# Patient Record
Sex: FEMALE | URBAN - METROPOLITAN AREA
[De-identification: names, ages, dates, MRNs, and addresses within clinical notes are randomized per-mention and may not be internally consistent; named-entity substitution may affect disease eponyms.]

---

## 2022-05-18 ENCOUNTER — APPOINTMENT (OUTPATIENT)
Dept: RADIOLOGY | Facility: CLINIC | Age: 37
End: 2022-05-18
Payer: COMMERCIAL

## 2022-05-18 ENCOUNTER — OFFICE VISIT (OUTPATIENT)
Dept: URGENT CARE | Facility: CLINIC | Age: 37
End: 2022-05-18
Payer: COMMERCIAL

## 2022-05-18 VITALS
HEIGHT: 63 IN | RESPIRATION RATE: 18 BRPM | OXYGEN SATURATION: 95 % | WEIGHT: 193 LBS | TEMPERATURE: 96.1 F | HEART RATE: 95 BPM | SYSTOLIC BLOOD PRESSURE: 141 MMHG | BODY MASS INDEX: 34.2 KG/M2 | DIASTOLIC BLOOD PRESSURE: 93 MMHG

## 2022-05-18 DIAGNOSIS — M54.6 ACUTE THORACIC BACK PAIN, UNSPECIFIED BACK PAIN LATERALITY: Primary | ICD-10-CM

## 2022-05-18 DIAGNOSIS — Z76.89 ENCOUNTER TO ESTABLISH CARE: ICD-10-CM

## 2022-05-18 DIAGNOSIS — M54.50 LUMBAR BACK PAIN: ICD-10-CM

## 2022-05-18 DIAGNOSIS — M54.6 ACUTE THORACIC BACK PAIN, UNSPECIFIED BACK PAIN LATERALITY: ICD-10-CM

## 2022-05-18 LAB — SL AMB POCT URINE HCG: NEGATIVE

## 2022-05-18 PROCEDURE — 72072 X-RAY EXAM THORAC SPINE 3VWS: CPT

## 2022-05-18 PROCEDURE — G0382 LEV 3 HOSP TYPE B ED VISIT: HCPCS | Performed by: PHYSICIAN ASSISTANT

## 2022-05-18 PROCEDURE — S9083 URGENT CARE CENTER GLOBAL: HCPCS | Performed by: PHYSICIAN ASSISTANT

## 2022-05-18 PROCEDURE — 72100 X-RAY EXAM L-S SPINE 2/3 VWS: CPT

## 2022-05-18 PROCEDURE — 81025 URINE PREGNANCY TEST: CPT | Performed by: PHYSICIAN ASSISTANT

## 2022-05-18 RX ORDER — MELOXICAM 15 MG/1
15 TABLET ORAL DAILY
Qty: 20 TABLET | Refills: 0 | Status: SHIPPED | OUTPATIENT
Start: 2022-05-18

## 2022-05-18 RX ORDER — IBUPROFEN 200 MG
600 TABLET ORAL EVERY 6 HOURS PRN
COMMUNITY

## 2022-05-18 RX ORDER — METHOCARBAMOL 750 MG/1
TABLET, FILM COATED ORAL
Qty: 24 TABLET | Refills: 0 | Status: SHIPPED | OUTPATIENT
Start: 2022-05-18

## 2022-05-18 NOTE — PATIENT INSTRUCTIONS
Stop ibuprofen  Start meloxicam and muscle relaxant as instructed  May add Tylenol throughout day if needed  May have synergistic effect with other medications  May do cold or warm compresses to back for comfort as needed  10-15 minutes every 1-2 hours while awake  Or Lidocaine pain patches that you can get over the counter  Consider physical therapy  Referral placed in chart for therapy if you would like to pursue  I believe this could be very helpful for you  Referral placed for family practice provider  Someone should contact you within the next 24-48 hours business days to talk with you about establishing care in this area  If significant worsening of pain, shortness of breath, profound weakness proceed immediately to emergency room for further evaluation

## 2022-05-18 NOTE — LETTER
May 18, 2022     Patient: Azucena Greer   YOB: 1985   Date of Visit: 5/18/2022       To Whom It May Concern:    Patient was seen in office today for acute medical ailment  May attempt return to work the next 2-3 days as tolerated           Sincerely,        Radha Schultz PA-C    CC: No Recipients

## 2022-05-18 NOTE — PROGRESS NOTES
[facility]    NAME: Saritha German is a 29 y o  female  : 1985    MRN: [id number]  DATE: May 18, 2022  TIME: 10:11 AM    Assessment and Plan   Acute thoracic back pain, unspecified back pain laterality [M54 6]  1  Acute thoracic back pain, unspecified back pain laterality  XR spine thoracic 3 vw    XR spine lumbar 2 or 3 views injury    POCT urine HCG    Ambulatory Referral to Physical Therapy    meloxicam (Mobic) 15 mg tablet    methocarbamol (ROBAXIN) 750 mg tablet   2  Lumbar back pain  POCT urine HCG    Ambulatory Referral to Physical Therapy    meloxicam (Mobic) 15 mg tablet    methocarbamol (ROBAXIN) 750 mg tablet   3  Encounter to establish care  Ambulatory Referral to [facility]     X-ray thoracic spine:  No acute bony changes  Await radiologist's final test results  X-ray lumbar spine:  No acute bony changes  Await radiologist's final test results  Patient Instructions   Patient Instructions   Stop ibuprofen  Start meloxicam and muscle relaxant as instructed  May add Tylenol throughout day if needed  May have synergistic effect with other medications  May do cold or warm compresses to back for comfort as needed  10-15 minutes every 1-2 hours while awake  Or Lidocaine pain patches that you can get over the counter  Consider physical therapy  Referral placed in chart for therapy if you would like to pursue  I believe this could be very helpful for you  Referral placed for family practice provider  Someone should contact you within the next 24-48 hours business days to talk with you about establishing care in this area  If significant worsening of pain, shortness of breath, profound weakness proceed immediately to emergency room for further evaluation  Chief Complaint     Chief Complaint   Patient presents with    Back Pain     Patient states that she was at home sitting giancarlo chair and twisted the wrong way on Monday and felt a pop in the back  Taking Ibuprofen and using heat with no relief       History of Present Illness   Shana Shane presents to the clinic c/o  58-year-old female with acute mid back pain that started suddenly when she was turning in a chair on Monday  Pain takes her breath away  Twisting to the right or bending causes increased pain  She is having trouble sleeping  She has been taking ibuprofen sometimes 4-6 at a time sporadically without relief  No new numbness in saddle area  She has had some residual numbness post  and childbirth  No numbness tingling weakness of the legs  She did have some discomfort left arm on Monday night but that has resolved  Taking deep breath causes increased pain  No fever chills  No weight loss  She and her  are actively trying to get pregnant  She is due for her next in the next couple days  She did take home pregnancy test that was negative  Review of Systems   Review of Systems   Constitutional: Positive for activity change and appetite change  Negative for chills, fatigue and fever  Respiratory: Negative  Cardiovascular: Negative  Musculoskeletal: Positive for arthralgias and back pain  Negative for myalgias  Skin: Negative for color change  Current Medications     Long-Term Medications   Medication Sig Dispense Refill    ibuprofen (MOTRIN) 200 mg tablet Take 600 mg by mouth every 6 (six) hours as needed for mild pain      meloxicam (Mobic) 15 mg tablet Take 1 tablet (15 mg total) by mouth in the morning  20 tablet 0    methocarbamol (ROBAXIN) 750 mg tablet 1/2 to 1 tablet every 6-8 hours or hs prn for muscle pain, spasms  Home use only   24 tablet 0       Current Allergies     Allergies as of 2022    (No Known Allergies)          The following portions of the patient's history were reviewed and updated as appropriate: allergies, current medications, past family history, past medical history, past social history, past surgical history and problem list   History reviewed  No pertinent past medical history  History reviewed  No pertinent surgical history  History reviewed  No pertinent family history  Objective   /93   Pulse 95   Temp (!) 96 1 °F (35 6 °C) (Tympanic)   Resp 18   Ht 5' 3" (1 6 m)   Wt 87 5 kg (193 lb)   LMP 04/23/2022 (Exact Date)   SpO2 95%   BMI 34 19 kg/m²   Patient's last menstrual period was 04/23/2022 (exact date)  Physical Exam     Physical Exam  Vitals and nursing note reviewed  Constitutional:       General: She is not in acute distress  Appearance: She is well-developed  She is not ill-appearing, toxic-appearing or diaphoretic  Comments: Appears uncomfortable but in no acute acute distress  Antalgic movement getting up and down on exam room table  Cardiovascular:      Rate and Rhythm: Normal rate  Heart sounds: Normal heart sounds  No murmur heard  No friction rub  No gallop  Pulmonary:      Effort: Pulmonary effort is normal  No respiratory distress  Breath sounds: Normal breath sounds  No stridor  No wheezing, rhonchi or rales  Musculoskeletal:         General: Tenderness present  No swelling or deformity  Comments: No gross spinal process or paraspinal muscle TTP  Good flexion and extension  Increased pain with right lateral bend and twist with wincing pain at times  No palpable spasms  Skin:     Findings: No bruising, erythema or lesion  Neurological:      Mental Status: She is alert and oriented to person, place, and time     Psychiatric:         Mood and Affect: Mood normal          Behavior: Behavior normal

## 2022-05-25 ENCOUNTER — OFFICE VISIT (OUTPATIENT)
Dept: URGENT CARE | Facility: CLINIC | Age: 37
End: 2022-05-25
Payer: COMMERCIAL

## 2022-05-25 VITALS
OXYGEN SATURATION: 100 % | WEIGHT: 195 LBS | SYSTOLIC BLOOD PRESSURE: 122 MMHG | TEMPERATURE: 96.8 F | BODY MASS INDEX: 33.29 KG/M2 | HEART RATE: 100 BPM | DIASTOLIC BLOOD PRESSURE: 86 MMHG | HEIGHT: 64 IN | RESPIRATION RATE: 16 BRPM

## 2022-05-25 DIAGNOSIS — Z20.822 ENCOUNTER FOR LABORATORY TESTING FOR COVID-19 VIRUS: ICD-10-CM

## 2022-05-25 DIAGNOSIS — J32.9 SINUSITIS, UNSPECIFIED CHRONICITY, UNSPECIFIED LOCATION: Primary | ICD-10-CM

## 2022-05-25 DIAGNOSIS — J40 BRONCHITIS: ICD-10-CM

## 2022-05-25 PROCEDURE — 87636 SARSCOV2 & INF A&B AMP PRB: CPT | Performed by: PHYSICIAN ASSISTANT

## 2022-05-25 PROCEDURE — S9083 URGENT CARE CENTER GLOBAL: HCPCS | Performed by: PHYSICIAN ASSISTANT

## 2022-05-25 PROCEDURE — G0382 LEV 3 HOSP TYPE B ED VISIT: HCPCS | Performed by: PHYSICIAN ASSISTANT

## 2022-05-25 RX ORDER — AZITHROMYCIN 250 MG/1
TABLET, FILM COATED ORAL
Qty: 6 TABLET | Refills: 0 | Status: SHIPPED | OUTPATIENT
Start: 2022-05-25 | End: 2022-05-29

## 2022-05-25 RX ORDER — BENZONATATE 100 MG/1
100 CAPSULE ORAL 3 TIMES DAILY PRN
Qty: 20 CAPSULE | Refills: 0 | Status: SHIPPED | OUTPATIENT
Start: 2022-05-25

## 2022-05-25 NOTE — LETTER
May 26, 2022     Patient: Ruiz Mast   YOB: 1985   Date of Visit: 5/25/2022       To Whom It May Concern:    Ruiz Mast was seen in my office on 5/25/2022  She had a positive SARS-COV-2 PCR test  It is my medical opinion that Ruiz Mast may return to work when symptoms subside  If you have any questions or concerns, please don't hesitate to call           Sincerely,        Christine John PA-C    CC: No Recipients

## 2022-05-25 NOTE — PROGRESS NOTES
[facility]        NAME: Saritha German is a 29 y o  female  : 1985    MRN: [id number]  DATE: May 25, 2022  TIME: 11:44 AM    /86   Pulse 100   Temp (!) 96 8 °F (36 °C) (Tympanic)   Resp 16   Ht 5' 4" (1 626 m)   Wt 88 5 kg (195 lb)   LMP 2022 (Approximate)   SpO2 100%   BMI 33 47 kg/m²     Assessment and Plan   Sinusitis, unspecified chronicity, unspecified location [J32 9]  1  Sinusitis, unspecified chronicity, unspecified location  Covid/Flu-Office Collect    azithromycin (ZITHROMAX) 250 mg tablet    benzonatate (TESSALON PERLES) 100 mg capsule   2  Bronchitis  azithromycin (ZITHROMAX) 250 mg tablet    benzonatate (TESSALON PERLES) 100 mg capsule   3  Encounter for laboratory testing for COVID-19 virus  azithromycin (ZITHROMAX) 250 mg tablet    benzonatate (TESSALON PERLES) 100 mg capsule         Patient Instructions       Follow up with PCP in 3-5 days  Proceed to  ER if symptoms worsen  Chief Complaint     Chief Complaint   Patient presents with    Cold Like Symptoms     Pt c/o fever, body aches, sinus pressure, congestion, and cough for the past 4 days  Pt took 2 at home COVID tests (negative)  Pt has been taking Tylenol for symptoms  History of Present Illness       Pt with 3-4 days of body aches fever sinus congestion and productive cough       Review of Systems   Review of Systems   Constitutional: Positive for fatigue and fever  HENT: Positive for congestion  Eyes: Negative  Respiratory: Positive for cough  Cardiovascular: Negative  Gastrointestinal: Negative  Endocrine: Negative  Genitourinary: Negative  Musculoskeletal: Positive for myalgias  Skin: Negative  Allergic/Immunologic: Negative  Neurological: Negative  Hematological: Negative  Psychiatric/Behavioral: Negative  All other systems reviewed and are negative          Current Medications       Current Outpatient Medications:     azithromycin (ZITHROMAX) 250 mg tablet, Take 2 tablets today then 1 tablet daily x 4 days, Disp: 6 tablet, Rfl: 0    benzonatate (TESSALON PERLES) 100 mg capsule, Take 1 capsule (100 mg total) by mouth 3 (three) times a day as needed for cough, Disp: 20 capsule, Rfl: 0    ibuprofen (MOTRIN) 200 mg tablet, Take 600 mg by mouth every 6 (six) hours as needed for mild pain, Disp: , Rfl:     meloxicam (Mobic) 15 mg tablet, Take 1 tablet (15 mg total) by mouth in the morning , Disp: 20 tablet, Rfl: 0    methocarbamol (ROBAXIN) 750 mg tablet, 1/2 to 1 tablet every 6-8 hours or hs prn for muscle pain, spasms  Home use only  , Disp: 24 tablet, Rfl: 0    Current Allergies     Allergies as of 05/25/2022    (No Known Allergies)            The following portions of the patient's history were reviewed and updated as appropriate: allergies, current medications, past family history, past medical history, past social history, past surgical history and problem list      History reviewed  No pertinent past medical history  History reviewed  No pertinent surgical history  History reviewed  No pertinent family history  Medications have been verified  Objective   /86   Pulse 100   Temp (!) 96 8 °F (36 °C) (Tympanic)   Resp 16   Ht 5' 4" (1 626 m)   Wt 88 5 kg (195 lb)   LMP 05/24/2022 (Approximate)   SpO2 100%   BMI 33 47 kg/m²        Physical Exam     Physical Exam  Vitals and nursing note reviewed  Constitutional:       Appearance: Normal appearance  She is normal weight  HENT:      Head: Normocephalic and atraumatic  Right Ear: Tympanic membrane, ear canal and external ear normal       Left Ear: Tympanic membrane, ear canal and external ear normal       Nose: Congestion and rhinorrhea present  Comments: Yellow nasal d/c boggy mucosa max sinus tenderness      Mouth/Throat:      Mouth: Mucous membranes are moist       Pharynx: Oropharynx is clear  Eyes:      Extraocular Movements: Extraocular movements intact  Conjunctiva/sclera: Conjunctivae normal       Pupils: Pupils are equal, round, and reactive to light  Cardiovascular:      Rate and Rhythm: Normal rate and regular rhythm  Pulses: Normal pulses  Heart sounds: Normal heart sounds  Pulmonary:      Effort: Pulmonary effort is normal       Comments: Minor coarse sounds   Abdominal:      General: Abdomen is flat  Bowel sounds are normal       Palpations: Abdomen is soft  Musculoskeletal:         General: Normal range of motion  Cervical back: Normal range of motion and neck supple  Skin:     General: Skin is warm  Capillary Refill: Capillary refill takes less than 2 seconds  Neurological:      General: No focal deficit present  Mental Status: She is alert and oriented to person, place, and time     Psychiatric:         Mood and Affect: Mood normal          Behavior: Behavior normal

## 2022-05-25 NOTE — LETTER
May 25, 2022     Patient: Elsie Doing   YOB: 1985   Date of Visit: 5/25/2022       To Whom It May Concern: It is my medical opinion that Elsie Doing should remain out of work until test results are back and are negative   If you have any questions or concerns, please don't hesitate to call           Sincerely,        Ace Matute PA-C    CC: No Recipients

## 2022-05-26 LAB
FLUAV RNA RESP QL NAA+PROBE: NEGATIVE
FLUBV RNA RESP QL NAA+PROBE: NEGATIVE
SARS-COV-2 RNA RESP QL NAA+PROBE: POSITIVE

## 2023-05-08 LAB
EXTERNAL CHLAMYDIA RESULT: NEGATIVE
EXTERNAL HIV SCREEN: NORMAL
HCV AB SER-ACNC: NEGATIVE
N GONORRHOEA RRNA SPEC QL PROBE: NEGATIVE

## 2023-05-15 PROBLEM — E66.9 OBESITY (BMI 30-39.9): Status: ACTIVE | Noted: 2023-05-15

## 2023-05-15 PROBLEM — K21.9 GASTROESOPHAGEAL REFLUX DISEASE WITHOUT ESOPHAGITIS: Status: ACTIVE | Noted: 2023-05-15

## 2023-05-15 NOTE — PROGRESS NOTES
"[facility]    NAME: Marilee Ledezma  AGE: 40 y o  SEX: female  : 1985     DATE: 2023     Assessment and Plan:     Patient presents today to establish care at our practice  Baseline labs recently ordered at [facility]  Fertility office in [facility], will request records  See [facility] specific charting    Follow up in 1 5 months or sooner pending test results      Problem List Items Addressed This Visit        Digestive    Gastroesophageal reflux disease without esophagitis     \"I had an endoscopy years ago    I was told I had silent reflux but never took anything  \" Reports worsening heartburn over the past year, currently taking omeprazole OTC prn, \"it works when I take it\"  Gets heartburn approx every 3 days  Denies dysphagia or painful swallowing  Patient agreed to start a 3-month regimen of omeprazole 20 mg daily to be taken regardless of symptoms  After this 3-month regimen is completed, will initiate a taper and trial of an H2 blocker  Relevant Medications    omeprazole (PriLOSEC) 20 mg delayed release capsule       Respiratory    Asthma     \"I think my heartburn and allergies makes it worse    it has worsened as the years go by  \" Requesting a refill of albuterol to use prn  Will assess need for an ICS at 3 month follow-up  Relevant Medications    albuterol (Proventil HFA) 90 mcg/act inhaler       Other    Obesity (BMI 30-39  9)    Seasonal allergies     Well controlled with prn loratadine  Moderate episode of recurrent major depressive disorder ([facility] [address] )     Reports a hx of panic attacks, used to take Xanax prn  Elevated PHQ score today, patient reports \"My issues are related to relationships and stress at work  I used to take Lexapro daily  \"     Patient is requesting to get restarted on Lexapro  Discussed side effects, pharmacokinetics, and pharmacodynamics of the medication    Will have the patient " follow-up in 6 weeks to reassess mental health  Relevant Medications    escitalopram (LEXAPRO) 5 mg tablet    RESOLVED: Reactive depression    Relevant Medications    escitalopram (LEXAPRO) 5 mg tablet   Other Visit Diagnoses     Annual physical exam    -  Primary    Encounter to establish care        Screening for cervical cancer        Relevant Orders    Ambulatory referral to Obstetrics / Gynecology    Depression screening              Immunizations and preventive care screenings were discussed with patient today  Appropriate education was printed on patient's after visit summary  Counseling:  Exercise: the importance of regular exercise/physical activity was discussed  Recommend exercise 3-5 times per week for at least 30 minutes  BMI Counseling: There is no height or weight on file to calculate BMI  The BMI is above normal  Nutrition recommendations include encouraging healthy choices of fruits and vegetables and consuming healthier snacks  Exercise recommendations include exercising 3-5 times per week  Rationale for BMI follow-up plan is due to patient being overweight or obese  Return in about 6 weeks (around 2023)  Chief Complaint:     Chief Complaint   Patient presents with   • Establish Care   • Asthma   • Heartburn      History of Present Illness:     Adult Annual Physical   Patient here for a comprehensive physical exam  The patient reports problems - heartburn and asthma  Diet and Physical Activity  Diet/Nutrition: poor diet  Exercise: no formal exercise        Depression Screening  PHQ-2/9 Depression Screening    Little interest or pleasure in doing things: 2 - more than half the days  Feeling down, depressed, or hopeless: 2 - more than half the days  Trouble falling or staying asleep, or sleeping too much: 2 - more than half the days  Feeling tired or having little energy: 2 - more than half the days  Poor appetite or overeatin - several days  Feeling bad about "yourself - or that you are a failure or have let yourself or your family down: 2 - more than half the days  Trouble concentrating on things, such as reading the newspaper or watching television: 1 - several days  Moving or speaking so slowly that other people could have noticed  Or the opposite - being so fidgety or restless that you have been moving around a lot more than usual: 2 - more than half the days  Thoughts that you would be better off dead, or of hurting yourself in some way: 0 - not at all  PHQ-2 Score: 4  PHQ-2 Interpretation: POSITIVE depression screen  PHQ-9 Score: 14   PHQ-9 Interpretation: Moderate depression        General Health  Sleep: sleeps well and snores loudly  Hearing: normal - bilateral   Vision: no vision problems  Dental: regular dental visits  Review of Systems:     Review of Systems   Constitutional: Negative  HENT: Negative  Eyes: Negative  Respiratory: Negative  Negative for shortness of breath and wheezing  Cardiovascular: Negative  Gastrointestinal: Negative  \"reflux\"   Endocrine: Negative  Genitourinary: Negative  Musculoskeletal: Negative  Skin: Negative  Allergic/Immunologic: Negative  Neurological: Negative  Hematological: Negative  Psychiatric/Behavioral: Positive for dysphoric mood  Negative for self-injury, sleep disturbance and suicidal ideas  The patient is nervous/anxious  Past Medical History:     History reviewed  No pertinent past medical history     Past Surgical History:     Past Surgical History:   Procedure Laterality Date   • APPENDECTOMY     •  SECTION        Social History:     Social History     Socioeconomic History   • Marital status: /Civil Union     Spouse name: None   • Number of children: None   • Years of education: None   • Highest education level: None   Occupational History   • None   Tobacco Use   • Smoking status: Former     Years: 10 00     Types: Cigarettes     Quit " "date: 2018     Years since quittin 3   • Smokeless tobacco: Never   Vaping Use   • Vaping Use: Former   Substance and Sexual Activity   • Alcohol use: Yes     Comment: 1-2 per week   • Drug use: None   • Sexual activity: None   Other Topics Concern   • None   Social History Narrative   • None     Social Determinants of Health     Financial Resource Strain: Not on file   Food Insecurity: Not on file   Transportation Needs: Not on file   Physical Activity: Not on file   Stress: Not on file   Social Connections: Not on file   Intimate Partner Violence: Not on file   Housing Stability: Not on file      Family History:     Family History   Problem Relation Age of Onset   • Diabetes Mother    • Asthma Mother    • Fibromyalgia Mother       Current Medications:     Current Outpatient Medications   Medication Sig Dispense Refill   • albuterol (Proventil HFA) 90 mcg/act inhaler Inhale 2 puffs every 6 (six) hours as needed for wheezing 6 7 g 5   • escitalopram (LEXAPRO) 5 mg tablet Take 1 tablet (5 mg total) by mouth daily 90 tablet 0   • loratadine (CLARITIN) 10 mg tablet Take 10 mg by mouth daily     • omeprazole (PriLOSEC) 20 mg delayed release capsule Take 1 capsule (20 mg total) by mouth daily 90 capsule 0     No current facility-administered medications for this visit  Allergies:     No Known Allergies   Physical Exam:     /92 (BP Location: Left arm, Patient Position: Sitting, Cuff Size: Large)   Pulse 86   Temp 98 2 °F (36 8 °C) (Tympanic)   Resp 16   Ht 5' 4\" (1 626 m)   Wt 98 5 kg (217 lb 2 5 oz)   SpO2 98%   BMI 37 27 kg/m²     Physical Exam  Vitals and nursing note reviewed  Constitutional:       Appearance: Normal appearance  She is obese  HENT:      Head: Normocephalic  Right Ear: Tympanic membrane normal       Left Ear: Tympanic membrane normal       Nose: Nose normal  No congestion  Mouth/Throat:      Mouth: Mucous membranes are moist       Pharynx: Oropharynx is clear   No " oropharyngeal exudate  Eyes:      Extraocular Movements: Extraocular movements intact  Conjunctiva/sclera: Conjunctivae normal       Pupils: Pupils are equal, round, and reactive to light  Cardiovascular:      Rate and Rhythm: Normal rate and regular rhythm  Pulses: Normal pulses  Heart sounds: Normal heart sounds  No murmur heard  Pulmonary:      Effort: Pulmonary effort is normal  No respiratory distress  Breath sounds: Normal breath sounds  No wheezing  Abdominal:      General: Bowel sounds are normal       Palpations: Abdomen is soft  Tenderness: There is no abdominal tenderness  Musculoskeletal:         General: No swelling, tenderness, deformity or signs of injury  Normal range of motion  Cervical back: Normal range of motion and neck supple  No tenderness  Right lower leg: No edema  Left lower leg: No edema  Lymphadenopathy:      Cervical: No cervical adenopathy  Skin:     General: Skin is warm and dry  Capillary Refill: Capillary refill takes less than 2 seconds  Findings: No rash  Neurological:      General: No focal deficit present  Mental Status: She is alert and oriented to person, place, and time  Cranial Nerves: No cranial nerve deficit  Sensory: No sensory deficit  Motor: No weakness        Coordination: Coordination normal       Gait: Gait normal       Deep Tendon Reflexes: Reflexes normal    Psychiatric:         Mood and Affect: Mood normal          Behavior: Behavior normal           Kathy Rosado, [facility] [address]

## 2023-05-18 ENCOUNTER — OFFICE VISIT (OUTPATIENT)
Age: 38
End: 2023-05-18

## 2023-05-18 VITALS
WEIGHT: 217.15 LBS | HEIGHT: 64 IN | RESPIRATION RATE: 16 BRPM | SYSTOLIC BLOOD PRESSURE: 132 MMHG | HEART RATE: 86 BPM | DIASTOLIC BLOOD PRESSURE: 92 MMHG | OXYGEN SATURATION: 98 % | BODY MASS INDEX: 37.07 KG/M2 | TEMPERATURE: 98.2 F

## 2023-05-18 DIAGNOSIS — F32.9 REACTIVE DEPRESSION: ICD-10-CM

## 2023-05-18 DIAGNOSIS — J45.909 ASTHMA, UNSPECIFIED ASTHMA SEVERITY, UNSPECIFIED WHETHER COMPLICATED, UNSPECIFIED WHETHER PERSISTENT: ICD-10-CM

## 2023-05-18 DIAGNOSIS — Z12.4 SCREENING FOR CERVICAL CANCER: ICD-10-CM

## 2023-05-18 DIAGNOSIS — K21.9 GASTROESOPHAGEAL REFLUX DISEASE WITHOUT ESOPHAGITIS: ICD-10-CM

## 2023-05-18 DIAGNOSIS — Z13.31 DEPRESSION SCREENING: ICD-10-CM

## 2023-05-18 DIAGNOSIS — J30.2 SEASONAL ALLERGIES: ICD-10-CM

## 2023-05-18 DIAGNOSIS — Z00.00 ANNUAL PHYSICAL EXAM: Primary | ICD-10-CM

## 2023-05-18 DIAGNOSIS — E66.9 OBESITY (BMI 30-39.9): ICD-10-CM

## 2023-05-18 DIAGNOSIS — F33.1 MODERATE EPISODE OF RECURRENT MAJOR DEPRESSIVE DISORDER (HCC): ICD-10-CM

## 2023-05-18 DIAGNOSIS — Z76.89 ENCOUNTER TO ESTABLISH CARE: ICD-10-CM

## 2023-05-18 RX ORDER — LORATADINE 10 MG/1
10 TABLET ORAL DAILY
COMMUNITY

## 2023-05-18 RX ORDER — ESCITALOPRAM OXALATE 5 MG/1
5 TABLET ORAL DAILY
Qty: 90 TABLET | Refills: 0 | Status: SHIPPED | OUTPATIENT
Start: 2023-05-18

## 2023-05-18 RX ORDER — OMEPRAZOLE 20 MG/1
20 CAPSULE, DELAYED RELEASE ORAL DAILY
Qty: 90 CAPSULE | Refills: 0 | Status: SHIPPED | OUTPATIENT
Start: 2023-05-18

## 2023-05-18 RX ORDER — ALBUTEROL SULFATE 90 UG/1
2 AEROSOL, METERED RESPIRATORY (INHALATION) EVERY 6 HOURS PRN
Qty: 6.7 G | Refills: 5 | Status: SHIPPED | OUTPATIENT
Start: 2023-05-18

## 2023-05-18 NOTE — PATIENT INSTRUCTIONS
Wellness Visit for Adults   AMBULATORY CARE:   A wellness visit  is when you see your healthcare provider to get screened for health problems  Your healthcare provider will also give you advice on how to stay healthy  Write down your questions so you remember to ask them  Ask your healthcare provider how often you should have a wellness visit  What happens at a wellness visit:  Your healthcare provider will ask about your health, and your family history of health problems  This includes high blood pressure, heart disease, and cancer  He or she will ask if you have symptoms that concern you, if you smoke, and about your mood  You may also be asked about your intake of medicines, supplements, food, and alcohol  Any of the following may be done: Your weight  will be checked  Your height may also be checked so your body mass index (BMI) can be calculated  Your BMI shows if you are at a healthy weight  Your blood pressure  and heart rate will be checked  Your temperature may also be checked  Blood and urine tests  may be done  Blood tests may be done to check your cholesterol levels  Abnormal cholesterol levels increase your risk for heart disease and stroke  You may also need a blood or urine test to check for diabetes if you are at increased risk  Urine tests may be done to look for signs of an infection or kidney disease  A physical exam  includes checking your heartbeat and lungs with a stethoscope  Your healthcare provider may also check your skin to look for sun damage  Screening tests  may be recommended  A screening test is done to check for diseases that may not cause symptoms  The screening tests you may need depend on your age, gender, family history, and lifestyle habits  For example, colorectal screening may be recommended if you are 48years old or older  Screening tests you need if you are a woman:   A Pap smear  is used to screen for cervical cancer   Pap smears are usually done every 3 to 5 years depending on your age  You may need them more often if you have had abnormal Pap smear test results in the past  Ask your healthcare provider how often you should have a Pap smear  A mammogram  is an x-ray of your breasts to screen for breast cancer  Experts recommend mammograms every 2 years starting at age 48 years  You may need a mammogram at age 52 years or younger if you have an increased risk for breast cancer  Talk to your healthcare provider about when you should start having mammograms and how often you need them  Vaccines you may need:   Get an influenza vaccine  every year  The influenza vaccine protects you from the flu  Several types of viruses cause the flu  The viruses change over time, so new vaccines are made each year  Get a tetanus-diphtheria (Td) booster vaccine  every 10 years  This vaccine protects you against tetanus and diphtheria  Tetanus is a severe infection that may cause painful muscle spasms and lockjaw  Diphtheria is a severe bacterial infection that causes a thick covering in the back of your mouth and throat  Get a human papillomavirus (HPV) vaccine  if you are female and aged 23 to 32 or male 23 to 24 and never received it  This vaccine protects you from HPV infection  HPV is the most common infection spread by sexual contact  HPV may also cause vaginal, penile, and anal cancers  Get a pneumococcal vaccine  if you are aged 72 years or older  The pneumococcal vaccine is an injection given to protect you from pneumococcal disease  Pneumococcal disease is an infection caused by pneumococcal bacteria  The infection may cause pneumonia, meningitis, or an ear infection  Get a shingles vaccine  if you are 60 or older, even if you have had shingles before  The shingles vaccine is an injection to protect you from the varicella-zoster virus  This is the same virus that causes chickenpox   Shingles is a painful rash that develops in people who had chickenpox or have been exposed to the virus  How to eat healthy:  My Plate is a model for planning healthy meals  It shows the types and amounts of foods that should go on your plate  Fruits and vegetables make up about half of your plate, and grains and protein make up the other half  A serving of dairy is included on the side of your plate  The amount of calories and serving sizes you need depends on your age, gender, weight, and height  Examples of healthy foods are listed below:  Eat a variety of vegetables  such as dark green, red, and orange vegetables  You can also include canned vegetables low in sodium (salt) and frozen vegetables without added butter or sauces  Eat a variety of fresh fruits , canned fruit in 100% juice, frozen fruit, and dried fruit  Include whole grains  At least half of the grains you eat should be whole grains  Examples include whole-wheat bread, wheat pasta, brown rice, and whole-grain cereals such as oatmeal     Eat a variety of protein foods such as seafood (fish and shellfish), lean meat, and poultry without skin (turkey and chicken)  Examples of lean meats include pork leg, shoulder, or tenderloin, and beef round, sirloin, tenderloin, and extra lean ground beef  Other protein foods include eggs and egg substitutes, beans, peas, soy products, nuts, and seeds  Choose low-fat dairy products such as skim or 1% milk or low-fat yogurt, cheese, and cottage cheese  Limit unhealthy fats  such as butter, hard margarine, and shortening  Exercise:  Exercise at least 30 minutes per day on most days of the week  Some examples of exercise include walking, biking, dancing, and swimming  You can also fit in more physical activity by taking the stairs instead of the elevator or parking farther away from stores  Include muscle strengthening activities 2 days each week  Regular exercise provides many health benefits   It helps you manage your weight, and decreases your risk for type 2 diabetes, heart disease, stroke, and high blood pressure  Exercise can also help improve your mood  Ask your healthcare provider about the best exercise plan for you  General health and safety guidelines:   Do not smoke  Nicotine and other chemicals in cigarettes and cigars can cause lung damage  Ask your healthcare provider for information if you currently smoke and need help to quit  E-cigarettes or smokeless tobacco still contain nicotine  Talk to your healthcare provider before you use these products  Limit alcohol  A drink of alcohol is 12 ounces of beer, 5 ounces of wine, or 1½ ounces of liquor  Lose weight, if needed  Being overweight increases your risk of certain health conditions  These include heart disease, high blood pressure, type 2 diabetes, and certain types of cancer  Protect your skin  Do not sunbathe or use tanning beds  Use sunscreen with a SPF 15 or higher  Apply sunscreen at least 15 minutes before you go outside  Reapply sunscreen every 2 hours  Wear protective clothing, hats, and sunglasses when you are outside  Drive safely  Always wear your seatbelt  Make sure everyone in your car wears a seatbelt  A seatbelt can save your life if you are in an accident  Do not use your cell phone when you are driving  This could distract you and cause an accident  Pull over if you need to make a call or send a text message  Practice safe sex  Use latex condoms if are sexually active and have more than one partner  Your healthcare provider may recommend screening tests for sexually transmitted infections (STIs)  Wear helmets, lifejackets, and protective gear  Always wear a helmet when you ride a bike or motorcycle, go skiing, or play sports that could cause a head injury  Wear protective equipment when you play sports  Wear a lifejacket when you are on a boat or doing water sports      © Copyright Elissa Ramirez 2022 Information is for End User's use only and may not be sold, redistributed or otherwise used for commercial purposes  The above information is an  only  It is not intended as medical advice for individual conditions or treatments  Talk to your doctor, nurse or pharmacist before following any medical regimen to see if it is safe and effective for you

## 2023-05-18 NOTE — ASSESSMENT & PLAN NOTE
"Reports a hx of panic attacks, used to take Xanax prn  Elevated PHQ score today, patient reports \"My issues are related to relationships and stress at work  I used to take Lexapro daily  \"     Patient is requesting to get restarted on Lexapro  Discussed side effects, pharmacokinetics, and pharmacodynamics of the medication  Will have the patient follow-up in 6 weeks to reassess mental health    "

## 2023-05-18 NOTE — ASSESSMENT & PLAN NOTE
"Reports a hx of panic attacks, used to take Xanax prn  Elevated PHQ score today, patient reports \"My issues are related to relationships and stress at work  I used to take Lexapro daily  \"   "

## 2023-05-18 NOTE — ASSESSMENT & PLAN NOTE
"\"I think my heartburn and allergies makes it worse    it has worsened as the years go by  \" Requesting a refill of albuterol to use prn  Will assess need for an ICS at 3 month follow-up     "

## 2023-05-18 NOTE — ASSESSMENT & PLAN NOTE
"\"I had an endoscopy years ago    I was told I had silent reflux but never took anything  \" Reports worsening heartburn over the past year, currently taking omeprazole OTC prn, \"it works when I take it\"  Gets heartburn approx every 3 days  Denies dysphagia or painful swallowing  Patient agreed to start a 3-month regimen of omeprazole 20 mg daily to be taken regardless of symptoms  After this 3-month regimen is completed, will initiate a taper and trial of an H2 blocker    "

## 2023-06-07 ENCOUNTER — TELEMEDICINE (OUTPATIENT)
Age: 38
End: 2023-06-07
Payer: COMMERCIAL

## 2023-06-07 DIAGNOSIS — F33.1 MODERATE EPISODE OF RECURRENT MAJOR DEPRESSIVE DISORDER (HCC): ICD-10-CM

## 2023-06-07 DIAGNOSIS — J45.909 ASTHMA, UNSPECIFIED ASTHMA SEVERITY, UNSPECIFIED WHETHER COMPLICATED, UNSPECIFIED WHETHER PERSISTENT: Primary | ICD-10-CM

## 2023-06-07 DIAGNOSIS — F41.9 ANXIETY: ICD-10-CM

## 2023-06-07 PROCEDURE — 99214 OFFICE O/P EST MOD 30 MIN: CPT | Performed by: NURSE PRACTITIONER

## 2023-06-07 RX ORDER — BUPROPION HYDROCHLORIDE 150 MG/1
150 TABLET ORAL EVERY MORNING
Qty: 30 TABLET | Refills: 5 | Status: SHIPPED | OUTPATIENT
Start: 2023-06-07 | End: 2023-12-04

## 2023-06-07 RX ORDER — ALBUTEROL SULFATE 2.5 MG/3ML
2.5 SOLUTION RESPIRATORY (INHALATION) EVERY 6 HOURS PRN
Qty: 180 ML | Refills: 5 | Status: SHIPPED | OUTPATIENT
Start: 2023-06-07

## 2023-06-07 RX ORDER — METHYLPREDNISOLONE 4 MG/1
TABLET ORAL
Qty: 21 EACH | Refills: 0 | Status: SHIPPED | OUTPATIENT
Start: 2023-06-07

## 2023-06-07 NOTE — PROGRESS NOTES
"Virtual Regular Visit    Verification of patient location:    Patient is located at Home in the following state in which I hold an active license [place]      Assessment/Plan:    Problem List Items Addressed This Visit        Respiratory    Asthma - Primary     Because patient is having limited success with her PHILLIP and having daily symptoms, I recommended that she be started on an ICS  Patient denied at this time, she believes that her symptoms will improve when the outside environment is less smoky  Patient requesting an albuterol nebulizer solution, ordered per her request   DME order placed for nebulizer machine and supplies  Also prescribed patient a Medrol therapy pack to initiate if her respiratory symptoms worsen or fail to improve with the use of her nebulizer  Patient continues to report \"I think my reflux is making my asthma worse\"  Encouraged a 1 month follow-up, patient plans on scheduling this follow-up when she comes to the office to  her nebulizer DME order  Relevant Medications    albuterol (2 5 mg/3 mL) 0 083 % nebulizer solution    methylPREDNISolone 4 MG tablet therapy pack    Other Relevant Orders    Nebulizer Supplies       Other    Moderate episode of recurrent major depressive disorder ([facility] [address] )     Based on shared decision making, will start patient on Wellbutrin 150 mg daily  Encouraged patient to take the medication in the morning to prevent sleep disturbances  Patient would also like to see a psychiatrist to further discuss her complex psych history of depression, anxiety, and ADHD  Referral order was placed today  Discussed what signs and symptoms warrant emergent medical care  Patient verbalized understanding  Relevant Medications    buPROPion (WELLBUTRIN XL) 150 mg 24 hr tablet    Other Relevant Orders    Ambulatory Referral to Psychiatry    Anxiety            Reason for visit is asthma and anxiety        Encounter provider EDGARDO Elias    Provider " "located at [address]  [facility] [address] [facility]  16   [phone]      Recent Visits  No visits were found meeting these conditions  Showing recent visits within past 7 days and meeting all other requirements  Today's Visits  Date Type Provider Dept   06/07/23 Telemedicine EDGARDO [facility]   Showing today's visits and meeting all other requirements  Future Appointments  No visits were found meeting these conditions  Showing future appointments within next 150 days and meeting all other requirements       The patient was identified by name and date of birth  Lydia Titus was informed that this is a telemedicine visit and that the visit is being conducted through the [employer]  She agrees to proceed     My office door was closed  No one else was in the room  She acknowledged consent and understanding of privacy and security of the video platform  The patient has agreed to participate and understands they can discontinue the visit at any time  Patient is aware this is a billable service  Subjective  Lydia Titus is a 40 y o  female  Stopped Lexapro last week, \"didn't like how it make me feel    made me more anxious\"  Reporting increasing stress levels at home and work  Denies suicidal and homicidal ideation  Patient also reports a PMHx of ADHD  Worsening \"chest tightness with smoke in the area    I feel tight everywhere    over the past year I have not been getting better\"  Current Disease Severity  The patient is having daytime symptoms more than 2 days per week but not daily (cough, SOB, \"throat drainage\")  The patient is using short-acting beta agonists for symptom control more than 2 days per week but not more than once a day  She has exacerbations requiring oral systemic corticosteroids 0 times per year  Current limitations in activity from asthma: none   Number of days of school or " work missed in the last month: 0  Number of urgent/emergent visit in last year: unknown  The patient is not using a spacer with MDIs  She is not monitoring peak flow rates at home  Night time awakenings:  2x over past week  History reviewed  No pertinent past medical history  Past Surgical History:   Procedure Laterality Date   • APPENDECTOMY     •  SECTION         Current Outpatient Medications   Medication Sig Dispense Refill   • albuterol (2 5 mg/3 mL) 0 083 % nebulizer solution Take 3 mL (2 5 mg total) by nebulization every 6 (six) hours as needed for wheezing or shortness of breath 180 mL 5   • buPROPion (WELLBUTRIN XL) 150 mg 24 hr tablet Take 1 tablet (150 mg total) by mouth every morning 30 tablet 5   • methylPREDNISolone 4 MG tablet therapy pack Use as directed on package 21 each 0   • albuterol (Proventil HFA) 90 mcg/act inhaler Inhale 2 puffs every 6 (six) hours as needed for wheezing 6 7 g 5   • loratadine (CLARITIN) 10 mg tablet Take 10 mg by mouth daily     • omeprazole (PriLOSEC) 20 mg delayed release capsule Take 1 capsule (20 mg total) by mouth daily 90 capsule 0     No current facility-administered medications for this visit  No Known Allergies    Review of Systems   Constitutional: Negative  HENT: Negative  Eyes: Negative  Respiratory: Positive for cough, chest tightness and shortness of breath  Negative for wheezing  Cardiovascular: Negative  Gastrointestinal: Negative  Endocrine: Negative  Genitourinary: Negative  Musculoskeletal: Negative  Skin: Negative  Allergic/Immunologic: Negative  Neurological: Negative  Hematological: Negative  Psychiatric/Behavioral: Negative for self-injury and suicidal ideas  The patient is nervous/anxious  Video Exam    There were no vitals filed for this visit  Physical Exam  Constitutional:       General: She is not in acute distress  Appearance: Normal appearance   She is not ill-appearing, toxic-appearing or diaphoretic  HENT:      Nose: Nose normal    Eyes:      Extraocular Movements: Extraocular movements intact  Conjunctiva/sclera: Conjunctivae normal    Pulmonary:      Effort: Pulmonary effort is normal  No respiratory distress  Breath sounds: No wheezing  Abdominal:      General: Abdomen is flat  Musculoskeletal:         General: No swelling or deformity  Normal range of motion  Cervical back: Normal range of motion  Skin:     Findings: No erythema or rash  Neurological:      Mental Status: She is alert and oriented to person, place, and time     Psychiatric:         Mood and Affect: Mood normal          Behavior: Behavior normal           Visit Time  Total Visit Duration: 35 min

## 2023-06-07 NOTE — ASSESSMENT & PLAN NOTE
"Because patient is having limited success with her PHILLIP and having daily symptoms, I recommended that she be started on an ICS  Patient denied at this time, she believes that her symptoms will improve when the outside environment is less smoky  Patient requesting an albuterol nebulizer solution, ordered per her request   DME order placed for nebulizer machine and supplies  Also prescribed patient a Medrol therapy pack to initiate if her respiratory symptoms worsen or fail to improve with the use of her nebulizer  Patient continues to report \"I think my reflux is making my asthma worse\"  Encouraged a 1 month follow-up, patient plans on scheduling this follow-up when she comes to the office to  her nebulizer DME order    "

## 2023-06-07 NOTE — ASSESSMENT & PLAN NOTE
Based on shared decision making, will start patient on Wellbutrin 150 mg daily  Encouraged patient to take the medication in the morning to prevent sleep disturbances  Patient would also like to see a psychiatrist to further discuss her complex psych history of depression, anxiety, and ADHD  Referral order was placed today  Discussed what signs and symptoms warrant emergent medical care  Patient verbalized understanding

## 2023-06-09 ENCOUNTER — TELEPHONE (OUTPATIENT)
Dept: PSYCHIATRY | Facility: CLINIC | Age: 38
End: 2023-06-09

## 2023-06-09 NOTE — TELEPHONE ENCOUNTER
Patient has been added to the Medication Management wait list with a referral     Insurance: [employer]  Insurance Type:    Commercial [x]   Medicaid []   [place] (if applicable)   Medicare []  Location Preference: [place] or [employer] Insurance: any  Virtual: Yes [x] No []

## 2023-06-09 NOTE — TELEPHONE ENCOUNTER
Contacted patient in regards to Routine Referral in attempts to verify patient's needs of services and add patient to proper wait list unable to   LVM for patient to contact intake dept  in regards to referral

## 2023-06-25 ENCOUNTER — RA CDI HCC (OUTPATIENT)
Dept: OTHER | Facility: HOSPITAL | Age: 38
End: 2023-06-25

## 2023-06-25 NOTE — PROGRESS NOTES
[facility]  coding opportunities       Chart reviewed, no opportunity found: CHART REVIEWED, NO OPPORTUNITY FOUND        Patients Insurance        Commercial Insurance: [employer]en Cali

## 2023-06-29 DIAGNOSIS — F33.1 MODERATE EPISODE OF RECURRENT MAJOR DEPRESSIVE DISORDER (HCC): ICD-10-CM

## 2023-06-29 RX ORDER — BUPROPION HYDROCHLORIDE 150 MG/1
TABLET ORAL
Qty: 90 TABLET | Refills: 2 | Status: SHIPPED | OUTPATIENT
Start: 2023-06-29

## 2023-07-17 NOTE — PROGRESS NOTES
Name: Sarwat Talley      : 1985      MRN: [id number]  Encounter Provider: EDGARDO Richardson  Encounter Date: 2023   Encounter department: 0 [facility] PRIMARY CARE    Assessment & Plan     1. Asthma, unspecified asthma severity, unspecified whether complicated, unspecified whether persistent  Assessment & Plan:  Using prn albuterol neb, "it is really opening me up". Using almost daily, "sometimes a few times per day". Also has a cough which patient attributes to asthma and GERD. Based on frequency of PHILLIP use and symptoms, will start patient on Flovent ICS: 2 puffs BID. Discussed admin instructions, verbalized understanding. Will bring patient back in 3-4 months to reassess. Orders:  -     fluticasone (FLOVENT HFA) 110 MCG/ACT inhaler; Inhale 2 puffs 2 (two) times a day Rinse mouth after use. 2. Gastroesophageal reflux disease without esophagitis  Assessment & Plan:  - Having heartburn intermittently, using omeprazole but only prn. I encouraged the patient to take the medication daily rather than prn for a 6-8 week trial, patient declined. - Switched diet recently to a keto diet. Reports she has been losing weight, weight appears unchanged since last office visit. - "I've had this forever and I think I just need to be on medication indefinitely"  - Offered referral to GI for further evaluation and a possible EGD, patient declined     Orders:  -     omeprazole (PriLOSEC) 20 mg delayed release capsule; Take 1 capsule (20 mg total) by mouth daily    3. Moderate episode of recurrent major depressive disorder [facility])  Assessment & Plan:  Was started on Wellbutrin last visit but patient stopped. "It didn't do anything and I don't want it to impact my fertility." Patient does not wish to pursue additional interventions at this time. On wait list with psychiatry. 4. Anxiety    5. Screening for cervical cancer  -     Ambulatory referral to Obstetrics / Gynecology;  Future        I have spent a total time of 30 minutes on 07/19/23 in caring for this patient including Risks and benefits of tx options, Instructions for management, Patient and family education, Importance of tx compliance, Risk factor reductions and Impressions. Subjective      Chronic care follow-up to reassess asthma, GERD, and depression    See [facility] specific charting        Review of Systems   Constitutional: Negative. HENT: Negative. Eyes: Negative. Respiratory: Positive for cough. Negative for shortness of breath and wheezing. Cardiovascular: Negative. Gastrointestinal: Negative. Negative for abdominal pain. Intermittent heartburn   Endocrine: Negative. Genitourinary: Negative. Musculoskeletal: Negative. Skin: Negative. Allergic/Immunologic: Negative. Neurological: Negative. Hematological: Negative. Psychiatric/Behavioral: Negative. Negative for self-injury and suicidal ideas. The patient is not nervous/anxious.         Current Outpatient Medications on File Prior to Visit   Medication Sig   • albuterol (2.5 mg/3 mL) 0.083 % nebulizer solution Take 3 mL (2.5 mg total) by nebulization every 6 (six) hours as needed for wheezing or shortness of breath   • albuterol (Proventil HFA) 90 mcg/act inhaler Inhale 2 puffs every 6 (six) hours as needed for wheezing   • [DISCONTINUED] omeprazole (PriLOSEC) 20 mg delayed release capsule Take 1 capsule (20 mg total) by mouth daily   • buPROPion (WELLBUTRIN XL) 150 mg 24 hr tablet TAKE 1 TABLET BY MOUTH EVERY DAY IN THE MORNING (Patient not taking: Reported on 7/19/2023)   • loratadine (CLARITIN) 10 mg tablet Take 10 mg by mouth daily (Patient not taking: Reported on 7/19/2023)   • methylPREDNISolone 4 MG tablet therapy pack Use as directed on package (Patient not taking: Reported on 7/19/2023)       Objective     /84 (BP Location: Left arm, Patient Position: Sitting, Cuff Size: Large)   Pulse 80   Temp 97.9 °F (36.6 °C) (Tympanic)   Resp 18   Ht 5' 3" (1.6 m)   Wt 99.3 kg (218 lb 14.7 oz)   SpO2 98%   BMI 38.78 kg/m²     Physical Exam  Constitutional:       General: She is not in acute distress. Appearance: Normal appearance. HENT:      Nose: Nose normal.   Eyes:      Extraocular Movements: Extraocular movements intact. Conjunctiva/sclera: Conjunctivae normal.   Cardiovascular:      Rate and Rhythm: Normal rate and regular rhythm. Heart sounds: Normal heart sounds. Pulmonary:      Effort: Pulmonary effort is normal. No respiratory distress. Breath sounds: Normal breath sounds. No stridor. No wheezing, rhonchi or rales. Chest:      Chest wall: No tenderness. Abdominal:      General: Abdomen is flat. Musculoskeletal:         General: No swelling or deformity. Normal range of motion. Cervical back: Normal range of motion. Skin:     Findings: No erythema or rash. Neurological:      Mental Status: She is alert and oriented to person, place, and time.    Psychiatric:         Mood and Affect: Mood normal.         Behavior: Behavior normal.       EDGARDO Salmon

## 2023-07-19 ENCOUNTER — OFFICE VISIT (OUTPATIENT)
Age: 38
End: 2023-07-19
Payer: COMMERCIAL

## 2023-07-19 VITALS
HEIGHT: 63 IN | SYSTOLIC BLOOD PRESSURE: 132 MMHG | TEMPERATURE: 97.9 F | WEIGHT: 218.92 LBS | BODY MASS INDEX: 38.79 KG/M2 | OXYGEN SATURATION: 98 % | RESPIRATION RATE: 18 BRPM | DIASTOLIC BLOOD PRESSURE: 84 MMHG | HEART RATE: 80 BPM

## 2023-07-19 DIAGNOSIS — K21.9 GASTROESOPHAGEAL REFLUX DISEASE WITHOUT ESOPHAGITIS: ICD-10-CM

## 2023-07-19 DIAGNOSIS — F33.1 MODERATE EPISODE OF RECURRENT MAJOR DEPRESSIVE DISORDER (HCC): ICD-10-CM

## 2023-07-19 DIAGNOSIS — J45.909 ASTHMA, UNSPECIFIED ASTHMA SEVERITY, UNSPECIFIED WHETHER COMPLICATED, UNSPECIFIED WHETHER PERSISTENT: Primary | ICD-10-CM

## 2023-07-19 DIAGNOSIS — F41.9 ANXIETY: ICD-10-CM

## 2023-07-19 DIAGNOSIS — Z12.4 SCREENING FOR CERVICAL CANCER: ICD-10-CM

## 2023-07-19 PROCEDURE — 99214 OFFICE O/P EST MOD 30 MIN: CPT | Performed by: NURSE PRACTITIONER

## 2023-07-19 RX ORDER — OMEPRAZOLE 20 MG/1
20 CAPSULE, DELAYED RELEASE ORAL DAILY
Qty: 90 CAPSULE | Refills: 3 | Status: SHIPPED | OUTPATIENT
Start: 2023-07-19

## 2023-07-19 RX ORDER — FLUTICASONE PROPIONATE 110 UG/1
2 AEROSOL, METERED RESPIRATORY (INHALATION) 2 TIMES DAILY
Qty: 12 G | Refills: 1 | Status: SHIPPED | OUTPATIENT
Start: 2023-07-19

## 2023-07-19 NOTE — ASSESSMENT & PLAN NOTE
Was started on Wellbutrin last visit but patient stopped. "It didn't do anything and I don't want it to impact my fertility." Patient does not wish to pursue additional interventions at this time. On wait list with psychiatry.

## 2023-07-19 NOTE — ASSESSMENT & PLAN NOTE
Using prn albuterol neb, "it is really opening me up". Using almost daily, "sometimes a few times per day". Also has a cough which patient attributes to asthma and GERD. Based on frequency of PHILLIP use and symptoms, will start patient on Flovent ICS: 2 puffs BID. Discussed admin instructions, verbalized understanding. Will bring patient back in 3-4 months to reassess.

## 2023-07-19 NOTE — ASSESSMENT & PLAN NOTE
- Having heartburn intermittently, using omeprazole but only prn. I encouraged the patient to take the medication daily rather than prn for a 6-8 week trial, patient declined. - Switched diet recently to a keto diet. Reports she has been losing weight, weight appears unchanged since last office visit.    - "I've had this forever and I think I just need to be on medication indefinitely"  - Offered referral to GI for further evaluation and a possible EGD, patient declined

## 2023-09-18 DIAGNOSIS — J45.909 ASTHMA, UNSPECIFIED ASTHMA SEVERITY, UNSPECIFIED WHETHER COMPLICATED, UNSPECIFIED WHETHER PERSISTENT: ICD-10-CM

## 2023-09-18 RX ORDER — DEXAMETHASONE 4 MG/1
TABLET ORAL
Qty: 12 G | Refills: 3 | Status: SHIPPED | OUTPATIENT
Start: 2023-09-18 | End: 2023-09-18

## 2023-09-18 RX ORDER — DEXAMETHASONE 4 MG/1
2 TABLET ORAL 2 TIMES DAILY
Qty: 12 G | Refills: 3 | Status: SHIPPED | OUTPATIENT
Start: 2023-09-18

## 2023-11-28 NOTE — PROGRESS NOTES
Name: Liza Alvarado      : 1985      MRN: [id number]  Encounter Provider: EDGARDO Campo  Encounter Date: 2023   Encounter department: [address] [place] [address] PRIMARY CARE    Assessment & Plan     Follow-up in 6 months for annual physical, or sooner prn    Declined GYN referral today. Getting fertility treatment/services through [facility]. Likely to have embryo implantation prior to the end of the calendar year. "I want to go to whatever GYN office they recommend". 1. Asthma, unspecified asthma severity, unspecified whether complicated, unspecified whether persistent  Assessment & Plan:  Last visit, patient was started on Flovent HFA 110mcg 2 puff BID    Flovent "works in the moment", not using albuterol. "Chest feels tight and heavy but I'm breathing fine". Mistakenly using Flovent inhaler as a rescue inhaler. Patient believes GERD and chronic sinus drainage are contributing. Based on shared decision making, will refer patient to allergist for further evaluation and treatment. Patient currently receiving fertility therapy/treatment and  does not want to start additional medication "unless absolutely necessary". Educated patient on appropriate use of albuterol and ICS inhalers, patient verbalized understanding. Discussed what signs and symptoms warrant emergent medical care. Patient verbalized understanding. Orders:  -     Ambulatory Referral to Allergy; Future    2. Anxiety  Assessment & Plan:  Per patient reports, anxiety symptoms > depression symptoms. PHQ-9 score of 0. Patient states that the anxiety is "situational... does not require meds. .. I don't want anything to effect my fertility treatments". Patient will likely be having embryo implantation before the end of the calendar year. Discussed what signs and symptoms warrant emergent medical care. Patient verbalized understanding. 3. Moderate episode of recurrent major depressive disorder ([address])    4. Gastroesophageal reflux disease without esophagitis  Assessment & Plan:  Omeprazole: taking daily, noticed an improvement in symptoms over taking PRN  Will continue to monitor    Patient states "I think I still have silent issues impacting my asthma"             Subjective      Routine follow-up to reassess chronic health conditions, last visit 4 months ago  Primary concern today: asthma    See [facility] specific charting        Review of Systems   Constitutional: Negative. Negative for fever. HENT: Negative. Eyes: Negative. Respiratory:  Positive for chest tightness. Negative for cough, shortness of breath, wheezing and stridor. Cardiovascular: Negative. Negative for chest pain. Gastrointestinal: Negative. Endocrine: Negative. Genitourinary: Negative. Musculoskeletal: Negative. Skin: Negative. Allergic/Immunologic: Negative. Neurological: Negative. Hematological: Negative. Psychiatric/Behavioral:  Negative for decreased concentration, dysphoric mood, sleep disturbance and suicidal ideas. The patient is nervous/anxious.         Current Outpatient Medications on File Prior to Visit   Medication Sig   • albuterol (2.5 mg/3 mL) 0.083 % nebulizer solution Take 3 mL (2.5 mg total) by nebulization every 6 (six) hours as needed for wheezing or shortness of breath   • albuterol (Proventil HFA) 90 mcg/act inhaler Inhale 2 puffs every 6 (six) hours as needed for wheezing   • Flovent  MCG/ACT inhaler INHALE 2 PUFFS BY MOUTH TWICE A DAY RINSE MOUTH AFTER USE   • omeprazole (PriLOSEC) 20 mg delayed release capsule Take 1 capsule (20 mg total) by mouth daily   • Alyacen 1/35 1-35 MG-MCG per tablet  (Patient not taking: Reported on 11/29/2023)   • buPROPion (WELLBUTRIN XL) 150 mg 24 hr tablet TAKE 1 TABLET BY MOUTH EVERY DAY IN THE MORNING (Patient not taking: Reported on 7/19/2023)   • estradiol (ESTRACE) 2 MG tablet  (Patient not taking: Reported on 11/29/2023)   • letrozole [facility]) 2.5 mg tablet Take 5 mg by mouth daily (Patient not taking: Reported on 11/29/2023)   • loratadine (CLARITIN) 10 mg tablet Take 10 mg by mouth daily (Patient not taking: Reported on 7/19/2023)   • methylPREDNISolone 4 MG tablet therapy pack Use as directed on package (Patient not taking: Reported on 7/19/2023)   • metroNIDAZOLE (METROGEL) 0.75 % vaginal gel  (Patient not taking: Reported on 11/29/2023)   • Progesterone 200 MG CAPS TAKE ONE CAPSULE VAGINALLY TWICE DAILY (Patient not taking: Reported on 11/29/2023)       Objective     /86 (BP Location: Left arm, Patient Position: Sitting, Cuff Size: Large)   Pulse 80   Resp 18   Ht 5' 4" (1.626 m)   Wt 99.9 kg (220 lb 3.8 oz)   SpO2 100%   BMI 37.80 kg/m²     Physical Exam  Constitutional:       General: She is not in acute distress. Appearance: Normal appearance. HENT:      Nose: Nose normal.   Eyes:      Extraocular Movements: Extraocular movements intact. Conjunctiva/sclera: Conjunctivae normal.   Cardiovascular:      Rate and Rhythm: Normal rate. Heart sounds: Normal heart sounds. Pulmonary:      Effort: Pulmonary effort is normal. No respiratory distress. Breath sounds: Normal breath sounds. No stridor. No wheezing, rhonchi or rales. Chest:      Chest wall: No tenderness. Abdominal:      General: Abdomen is flat. Musculoskeletal:         General: No swelling or deformity. Normal range of motion. Cervical back: Normal range of motion. Skin:     Findings: No erythema or rash. Neurological:      Mental Status: She is alert and oriented to person, place, and time.    Psychiatric:         Mood and Affect: Mood normal.         Behavior: Behavior normal.       EDGARDO Ewing

## 2023-11-29 ENCOUNTER — OFFICE VISIT (OUTPATIENT)
Age: 38
End: 2023-11-29
Payer: COMMERCIAL

## 2023-11-29 VITALS
HEIGHT: 64 IN | SYSTOLIC BLOOD PRESSURE: 130 MMHG | WEIGHT: 220.24 LBS | BODY MASS INDEX: 37.6 KG/M2 | DIASTOLIC BLOOD PRESSURE: 86 MMHG | RESPIRATION RATE: 18 BRPM | HEART RATE: 80 BPM | OXYGEN SATURATION: 100 %

## 2023-11-29 DIAGNOSIS — F41.9 ANXIETY: ICD-10-CM

## 2023-11-29 DIAGNOSIS — F33.1 MODERATE EPISODE OF RECURRENT MAJOR DEPRESSIVE DISORDER (HCC): ICD-10-CM

## 2023-11-29 DIAGNOSIS — K21.9 GASTROESOPHAGEAL REFLUX DISEASE WITHOUT ESOPHAGITIS: ICD-10-CM

## 2023-11-29 DIAGNOSIS — J45.909 ASTHMA, UNSPECIFIED ASTHMA SEVERITY, UNSPECIFIED WHETHER COMPLICATED, UNSPECIFIED WHETHER PERSISTENT: Primary | ICD-10-CM

## 2023-11-29 PROCEDURE — 99214 OFFICE O/P EST MOD 30 MIN: CPT | Performed by: NURSE PRACTITIONER

## 2023-11-29 RX ORDER — NORETHINDRONE AND ETHINYL ESTRADIOL 1 MG-35MCG
KIT ORAL
COMMUNITY
Start: 2023-11-09

## 2023-11-29 RX ORDER — METRONIDAZOLE 7.5 MG/G
GEL VAGINAL
COMMUNITY
Start: 2023-11-27

## 2023-11-29 RX ORDER — ESTRADIOL 2 MG/1
TABLET ORAL
COMMUNITY
Start: 2023-11-08

## 2023-11-29 RX ORDER — LETROZOLE 2.5 MG/1
5 TABLET, FILM COATED ORAL DAILY
COMMUNITY
Start: 2023-11-04

## 2023-11-29 RX ORDER — PROGESTERONE 200 MG/1
CAPSULE ORAL
COMMUNITY
Start: 2023-09-01

## 2023-11-29 NOTE — ASSESSMENT & PLAN NOTE
Omeprazole: taking daily, noticed an improvement in symptoms over taking PRN  Will continue to monitor    Patient states "I think I still have silent issues impacting my asthma"

## 2023-11-29 NOTE — PATIENT INSTRUCTIONS
[facility] [phone]     Use the albuterol inhaler as needed for immediate relief  Flovent is for daily use (2x per day) regardless of symptoms

## 2023-11-29 NOTE — ASSESSMENT & PLAN NOTE
Last visit, patient was started on Flovent HFA 110mcg 2 puff BID    Flovent "works in the moment", not using albuterol. "Chest feels tight and heavy but I'm breathing fine". Mistakenly using Flovent inhaler as a rescue inhaler. Patient believes GERD and chronic sinus drainage are contributing. Based on shared decision making, will refer patient to allergist for further evaluation and treatment. Patient currently receiving fertility therapy/treatment and  does not want to start additional medication "unless absolutely necessary". Educated patient on appropriate use of albuterol and ICS inhalers, patient verbalized understanding. Discussed what signs and symptoms warrant emergent medical care. Patient verbalized understanding.

## 2023-11-30 NOTE — ASSESSMENT & PLAN NOTE
Per patient reports, anxiety symptoms > depression symptoms. PHQ-9 score of 0. Patient states that the anxiety is "situational... does not require meds. .. I don't want anything to effect my fertility treatments". Patient will likely be having embryo implantation before the end of the calendar year. Discussed what signs and symptoms warrant emergent medical care. Patient verbalized understanding.

## 2023-12-13 ENCOUNTER — OFFICE VISIT (OUTPATIENT)
Age: 38
End: 2023-12-13
Payer: COMMERCIAL

## 2023-12-13 VITALS
OXYGEN SATURATION: 100 % | TEMPERATURE: 96.5 F | RESPIRATION RATE: 18 BRPM | HEART RATE: 93 BPM | BODY MASS INDEX: 37.39 KG/M2 | HEIGHT: 64 IN | SYSTOLIC BLOOD PRESSURE: 151 MMHG | DIASTOLIC BLOOD PRESSURE: 90 MMHG | WEIGHT: 219 LBS

## 2023-12-13 DIAGNOSIS — J01.90 ACUTE BACTERIAL SINUSITIS: Primary | ICD-10-CM

## 2023-12-13 DIAGNOSIS — B96.89 ACUTE BACTERIAL SINUSITIS: Primary | ICD-10-CM

## 2023-12-13 PROCEDURE — 99213 OFFICE O/P EST LOW 20 MIN: CPT

## 2023-12-13 RX ORDER — AMOXICILLIN 875 MG/1
875 TABLET, COATED ORAL 2 TIMES DAILY
Qty: 14 TABLET | Refills: 0 | Status: SHIPPED | OUTPATIENT
Start: 2023-12-13 | End: 2023-12-20

## 2023-12-13 NOTE — PROGRESS NOTES
[facility]        NAME: Carmita Lockhart is a 41 y.o. female  : 1985    MRN: [id number]  DATE: 2023  TIME: 4:36 PM    Assessment and Plan   Acute bacterial sinusitis [J01.90, B96.89]  1. Acute bacterial sinusitis  amoxicillin (AMOXIL) 875 mg tablet            Patient Instructions   For decongestion, Over The Counter medications:  Nasal corticosteroid: examples are Flonase or Nasacort. For Cough or sore throat:  Salt water gurgle  Honey  Chloraseptic spray  Throat lozenges  Over the Counter Tylenol  Dextromethorphan 30mg PO every 6-8 hours for cough. max 120 mg in 24 hour period  Follow up with PCP in 3-5 days if symptoms not improving. Proceed to ER if symptoms worsen. Chief Complaint     Chief Complaint   Patient presents with    Sore Throat     Swollen glands that are causing her throat to hurt for >7 days. Son was also sick at home. Voice changes. Pressure in chest, coughing, runny nose, mucus production, fever has resolved. History of Present Illness       Sore throat for over 7 days. Son had URI prior. Feels like swollen glands is bothering her ears as well as her swallowing. Has some nasal congestion and sinus pressure and pain. Going through IVF treatment. Review of Systems   Review of Systems   Constitutional:  Negative for chills and fever. HENT:  Positive for postnasal drip, rhinorrhea and sore throat. Negative for ear pain. Eyes:  Negative for pain and visual disturbance. Respiratory:  Positive for cough. Negative for shortness of breath. Cardiovascular:  Negative for chest pain and palpitations. Gastrointestinal:  Negative for abdominal pain and vomiting. Genitourinary:  Negative for dysuria and hematuria. Musculoskeletal:  Negative for arthralgias and back pain. Skin:  Negative for color change and rash. Neurological:  Negative for dizziness, seizures, syncope, weakness and numbness.    All other systems reviewed and are negative.         Current Medications       Current Outpatient Medications:     albuterol (2.5 mg/3 mL) 0.083 % nebulizer solution, Take 3 mL (2.5 mg total) by nebulization every 6 (six) hours as needed for wheezing or shortness of breath, Disp: 180 mL, Rfl: 5    albuterol (Proventil HFA) 90 mcg/act inhaler, Inhale 2 puffs every 6 (six) hours as needed for wheezing, Disp: 6.7 g, Rfl: 5    amoxicillin (AMOXIL) 875 mg tablet, Take 1 tablet (875 mg total) by mouth 2 (two) times a day for 7 days, Disp: 14 tablet, Rfl: 0    estradiol (ESTRACE) 2 MG tablet, , Disp: , Rfl:     Flovent  MCG/ACT inhaler, INHALE 2 PUFFS BY MOUTH TWICE A DAY RINSE MOUTH AFTER USE, Disp: 12 g, Rfl: 3    omeprazole (PriLOSEC) 20 mg delayed release capsule, Take 1 capsule (20 mg total) by mouth daily, Disp: 90 capsule, Rfl: 3    Progesterone 200 MG CAPS, , Disp: , Rfl:     Alyacen 1/35 1-35 MG-MCG per tablet, , Disp: , Rfl:     buPROPion (WELLBUTRIN XL) 150 mg 24 hr tablet, TAKE 1 TABLET BY MOUTH EVERY DAY IN THE MORNING (Patient not taking: Reported on 7/19/2023), Disp: 90 tablet, Rfl: 2    letrozole (FEMARA) 2.5 mg tablet, Take 5 mg by mouth daily (Patient not taking: Reported on 11/29/2023), Disp: , Rfl:     loratadine (CLARITIN) 10 mg tablet, Take 10 mg by mouth daily (Patient not taking: Reported on 7/19/2023), Disp: , Rfl:     methylPREDNISolone 4 MG tablet therapy pack, Use as directed on package (Patient not taking: Reported on 7/19/2023), Disp: 21 each, Rfl: 0    metroNIDAZOLE (METROGEL) 0.75 % vaginal gel, , Disp: , Rfl:     Current Allergies     Allergies as of 12/13/2023    (No Known Allergies)            The following portions of the patient's history were reviewed and updated as appropriate: allergies, current medications, past family history, past medical history, past social history, past surgical history and problem list.     Past Medical History:   Diagnosis Date    Allergic     Anxiety 2012    Diagnosis from [place] dr reid    Asthma     GERD (gastroesophageal reflux disease) 2009    Headache(784.0)     Random       Past Surgical History:   Procedure Laterality Date    APPENDECTOMY       SECTION         Family History   Problem Relation Age of Onset    Diabetes Mother     Asthma Mother     Fibromyalgia Mother     Arthritis Mother     Arthritis Father     Colon cancer Maternal Grandfather          Medications have been verified. Objective   /90   Pulse 93   Temp (!) 96.5 °F (35.8 °C)   Resp 18   Ht 5' 4" (1.626 m)   Wt 99.3 kg (219 lb)   LMP 2023 (Exact Date)   SpO2 100%   BMI 37.59 kg/m²   Patient's last menstrual period was 2023 (exact date). Physical Exam     Physical Exam  Vitals and nursing note reviewed. Constitutional:       Appearance: Normal appearance. HENT:      Head: Normocephalic and atraumatic. Right Ear: Tympanic membrane normal.      Left Ear: Tympanic membrane normal.      Nose: Congestion and rhinorrhea present. Right Sinus: Frontal sinus tenderness present. Left Sinus: Frontal sinus tenderness present. Mouth/Throat:      Mouth: Mucous membranes are moist.   Eyes:      Conjunctiva/sclera: Conjunctivae normal.   Cardiovascular:      Rate and Rhythm: Normal rate. Pulmonary:      Effort: Pulmonary effort is normal.      Breath sounds: Normal breath sounds. Musculoskeletal:      Cervical back: Normal range of motion and neck supple. Skin:     General: Skin is warm and dry. Capillary Refill: Capillary refill takes less than 2 seconds. Neurological:      General: No focal deficit present. Mental Status: She is alert and oriented to person, place, and time. Mental status is at baseline. Sensory: No sensory deficit. Motor: No weakness. Psychiatric:         Mood and Affect: Mood normal.         Behavior: Behavior normal.         Thought Content:  Thought content normal.

## 2023-12-13 NOTE — PATIENT INSTRUCTIONS
For decongestion, Over The Counter medications:  Nasal corticosteroid: examples are Flonase or Nasacort. For Cough or sore throat:  Salt water gurgle  Honey  Chloraseptic spray  Throat lozenges  Over the Counter Tylenol  Dextromethorphan 30mg PO every 6-8 hours for cough. max 120 mg in 24 hour period  Follow up with PCP in 3-5 days if symptoms not improving. Proceed to ER if symptoms worsen.

## 2024-02-05 DIAGNOSIS — K21.9 GASTROESOPHAGEAL REFLUX DISEASE WITHOUT ESOPHAGITIS: ICD-10-CM

## 2024-02-05 RX ORDER — ESOMEPRAZOLE MAGNESIUM 40 MG/1
GRANULE, DELAYED RELEASE ORAL
Refills: 0 | OUTPATIENT
Start: 2024-02-05

## 2024-02-05 RX ORDER — OMEPRAZOLE 20 MG/1
20 CAPSULE, DELAYED RELEASE ORAL DAILY
Qty: 90 CAPSULE | Refills: 3 | Status: SHIPPED | OUTPATIENT
Start: 2024-02-05

## 2024-04-01 ENCOUNTER — INITIAL PRENATAL (OUTPATIENT)
Age: 39
End: 2024-04-01
Payer: COMMERCIAL

## 2024-04-01 VITALS
BODY MASS INDEX: 36.5 KG/M2 | WEIGHT: 213.8 LBS | DIASTOLIC BLOOD PRESSURE: 76 MMHG | HEART RATE: 88 BPM | HEIGHT: 64 IN | SYSTOLIC BLOOD PRESSURE: 116 MMHG | OXYGEN SATURATION: 99 %

## 2024-04-01 DIAGNOSIS — N91.2 AMENORRHEA: Primary | ICD-10-CM

## 2024-04-01 PROBLEM — Z14.8 CARRIER OF HEREDITARY DISEASE: Status: ACTIVE | Noted: 2024-04-01

## 2024-04-01 PROBLEM — O09.529 ANTEPARTUM MULTIGRAVIDA OF ADVANCED MATERNAL AGE: Status: ACTIVE | Noted: 2024-04-01

## 2024-04-01 PROBLEM — Z98.891 HISTORY OF C-SECTION: Status: ACTIVE | Noted: 2024-04-01

## 2024-04-01 PROBLEM — O09.819 PREGNANCY RESULTING FROM ASSISTED REPRODUCTIVE TECHNOLOGY, ANTEPARTUM: Status: ACTIVE | Noted: 2024-04-01

## 2024-04-01 LAB — SL AMB POCT URINE HCG: POSITIVE

## 2024-04-01 PROCEDURE — 81025 URINE PREGNANCY TEST: CPT | Performed by: PHYSICIAN ASSISTANT

## 2024-04-01 PROCEDURE — 99203 OFFICE O/P NEW LOW 30 MIN: CPT | Performed by: PHYSICIAN ASSISTANT

## 2024-04-01 PROCEDURE — 76817 TRANSVAGINAL US OBSTETRIC: CPT | Performed by: PHYSICIAN ASSISTANT

## 2024-04-01 RX ORDER — ASPIRIN 81 MG/1
81 TABLET, CHEWABLE ORAL DAILY
COMMUNITY

## 2024-04-01 NOTE — PROGRESS NOTES
"   Subjective  Patient ID: Darlin Mckinney is a 38 y.o. female here for amenorrhea    Pregnancy a result of IVF patient was given SHEREEN of 11/3/24 and a gestational age of  9 weeks 1days (based on LMP).  No records available to visit today.  Will request.    Pregnancy was planned.   She has  started taking a prenatal vitamin      Signs and symptoms of pregnancy:   Breast tenderness: yes  Fatigue: yes  Cramping or Pelvic Pain: no  Spotting or Vaginal Bleeding: spotting early March X 1 day  Nausea or vomiting: + nausea  Blood type: A positive per patient.    OB History    Para Term  AB Living   2 1 1     1   SAB IAB Ectopic Multiple Live Births           1      # Outcome Date GA Lbr Fly/2nd Weight Sex Delivery Anes PTL Lv   2 Term 12 40w0d  3544 g (7 lb 13 oz) M    YARA      Complications: Fetal Intolerance   1                  The following portions of the patient's history were reviewed and updated as appropriate: allergies, current medications, past family history, past medical history, past social history, past surgical history, and problem list.    Perinent hx that may affect pregnancy:    AMA  BMI 37  Asthma  Depression/ anxiety  GERD  IVF pregnancy  Hx of csection      Review of Systems   Constitutional: Negative.    HENT: Negative.    Eyes: Negative.    Respiratory: Negative.    Cardiovascular: Negative.    Gastrointestinal: Negative.    Endocrine: Negative.    Genitourinary:        As noted in HPI   Musculoskeletal: Negative.    Skin: Negative.    Allergic/Immunologic: Negative.    Neurological: Negative.    Hematological: Negative.    Psychiatric/Behavioral: Negative      See HPI for pertinent positives.            /76 (BP Location: Right arm, Cuff Size: Large)   Pulse 88   Ht 5' 4\" (1.626 m)   Wt 97 kg (213 lb 12.8 oz)   SpO2 99%   BMI 36.70 kg/m²         FIRST TRIMESTER OBSTETRIC ULTRASOUND    INDICATION: Establish Gestational Age       FINDINGS:  A single intrauterine " gestation is identified.  Cardiac activity is detected at 175bpm.      YOLK SAC:  Present and normal in size and appearance.  MEAN CROWN RUMP LENGTH:  24.0mm = 9weeks 0 days   AMNIOTIC FLUID/SAC SHAPE:  Within expected normal range.     UTERUS/ADNEXA:   No adnexal mass or pathologic cyst.  No free fluid identified.    IMPRESSION:    Single intrauterine pregnancy of 9 weeks 0days gestational age  Fetal cardiac activity detected.  No adnexal masses seen.    EDC by this Ultrasound: 11/4/24    Assigning a Final SHEREEN  Please choose how you are assigning the SHEREEN: The gestational age by LMP is 9w 0d - 13w 6d and demonstrates more than 7 days difference from the gestational age by CRL, therefore the final SHEREEN will be based on the ultrasound at this encounter    Final SHEREEN: 11/4/24 by ultrasound at this encounter.    Ran Farnsworth PA-C  OB/GYN COMPLETE WOMENS [facility] OB/[facility]N COM[facility]TE [facility]OM[facility]  [address]  [address]  [facility] [address]  Dept: [phone]  Dept Fax: [phone]  Loc Appt: [phone]  Loc: [phone]  Loc Fax: [phone]  Ultrasound Probe Disinfection    A transvaginal ultrasound was performed.   Prior to use, disinfection was performed with High Level Disinfection Process (Trophon).  Probe serial number [id number] was used.            Assessment/Plan:  SIUP confirmed with SHEREEN 11/4/24 by US, awaiting records from fertility center  F/u in 2-3 weeks for ob intake and ob exam  MFM referral placed  Patient to [facility] OB list   Record release signed for csection report, paps, infertility records  I have spent a total time of 30 minutes on 04/01/24 in caring for this patient including Instructions for management, Impressions, Counseling / Coordination of care, Documenting in the medical record, Reviewing / ordering tests, medicine, procedures  , and Obtaining or reviewing history  .

## 2024-04-01 NOTE — PATIENT INSTRUCTIONS
Avoiding fasting for prolonged periods of time - a simple snack such as crackers upon waking may be helpful.  - Meals and snacks should be eaten slowly and in small amounts every one to two hours to avoid an overly full stomach  - Don't lie down immediately after eating  - Avoid triggers foods: examples include excessively spicy, odorous, high-fat, acidic, very sweet foods, coffee, soda  - Hard peppermint candies, clair chews may be helpful     - Vitamin B6 (pyridoxine) 25-50 mg every 6-8 hours  - Unisom (doxylamine) 25 mg 1-2 tabs at night   - pepcid 20mg twice daily

## 2024-04-02 ENCOUNTER — OFFICE VISIT (OUTPATIENT)
Age: 39
End: 2024-04-02
Payer: COMMERCIAL

## 2024-04-02 VITALS
HEART RATE: 99 BPM | OXYGEN SATURATION: 98 % | WEIGHT: 211.64 LBS | RESPIRATION RATE: 17 BRPM | SYSTOLIC BLOOD PRESSURE: 144 MMHG | TEMPERATURE: 98.5 F | BODY MASS INDEX: 36.13 KG/M2 | DIASTOLIC BLOOD PRESSURE: 93 MMHG | HEIGHT: 64 IN

## 2024-04-02 DIAGNOSIS — J06.9 URI WITH COUGH AND CONGESTION: Primary | ICD-10-CM

## 2024-04-02 PROCEDURE — G0382 LEV 3 HOSP TYPE B ED VISIT: HCPCS

## 2024-04-02 NOTE — LETTER
April 2, 2024     Patient: Darlin Mckinney   YOB: 1985   Date of Visit: 4/2/2024       To Whom it May Concern:    Darlin Mckinney was seen in my clinic on 4/2/2024. She may return to work on 4/4/2024 .    If you have any questions or concerns, please don't hesitate to call.         Sincerely,          EDGARDO Enamorado        CC: No Recipients

## 2024-04-02 NOTE — PATIENT INSTRUCTIONS
At this time you have been diagnosed with a Viral Infection.   Antibiotics are not indicated for viral infections.   Taking antibiotics while you have a viral infection is the wrong treatment for a viral infection.  Viruses are self limiting meaning your body fights it off given sufficient time to do so.  For viral illnesses, the recommendation is for supportive care which would consists of the following:  For decongestion:  Flonase  Nasal saline irrigation  Humidified air  If age appropriate: Warm moist air such as a hot cup of water in a mug, sit at the dining room table with the mug on the table, put a towel over your head to cover over the mug and breath in the warm steam (don't drink the fluid in case you have mucus that drips in) or allow for warm shower to breath in the warm moist air in the bathroom.  Topical application of Vicks Vapor rub which contains camphor, menthol, and eucalyptus oils   For Cough or sore throat:  Robitussin (Dextromethorphan)   Honey  Tylenol as needed.    Follow up with Primary Care Provider in 3-5 days if not improving.  Proceed to Emergency Department if symptoms worsen.    If tests have been performed at [facility], our office will contact you with results if changes need to be made to the care plan discussed with you at the visit.  You can review your full results on [facility] MyChart.

## 2024-04-02 NOTE — PROGRESS NOTES
[facility]        NAME: Darlin Mckinney is a 38 y.o. female  : 1985    MRN: [id number]  DATE: 2024  TIME: 11:48 AM    Assessment and Plan   URI with cough and congestion [J06.9]  1. URI with cough and congestion              Patient Instructions   At this time you have been diagnosed with a Viral Infection.   Antibiotics are not indicated for viral infections.   Taking antibiotics while you have a viral infection is the wrong treatment for a viral infection.  Viruses are self limiting meaning your body fights it off given sufficient time to do so.  For viral illnesses, the recommendation is for supportive care which would consists of the following:  For decongestion:  Flonase  Nasal saline irrigation  Humidified air  If age appropriate: Warm moist air such as a hot cup of water in a mug, sit at the dining room table with the mug on the table, put a towel over your head to cover over the mug and breath in the warm steam (don't drink the fluid in case you have mucus that drips in) or allow for warm shower to breath in the warm moist air in the bathroom.  Topical application of Vicks Vapor rub which contains camphor, menthol, and eucalyptus oils   For Cough or sore throat:  Robitussin (Dextromethorphan)   Honey  Tylenol as needed.    Follow up with Primary Care Provider in 3-5 days if not improving.  Proceed to Emergency Department if symptoms worsen.    If tests have been performed at [facility], our office will contact you with results if changes need to be made to the care plan discussed with you at the visit.  You can review your full results on [facility] MyChart.    Chief Complaint     Chief Complaint   Patient presents with   • Cold Like Symptoms     Sinus congestion, unable to sleep, coughing from post nasal drip, throat irritation, nausea x 3 days. Son also recently sick with similar sx. OTC - none         History of Present Illness       Sinus congestion, postnasal drip, cough starting 3  days ago.  Son with the same symptoms these past couple days.  She is currently about 9 weeks pregnant through IVF.  States she has not taking any medication due to pregnancy.  Has utilize honey.  States cough and postnasal drip has been the worst the last 2 nights and she is unable to sleep because of that        Review of Systems   Review of Systems   Constitutional:  Negative for chills and fever.   HENT:  Positive for congestion, postnasal drip and rhinorrhea. Negative for ear pain and sore throat.    Eyes:  Negative for pain and visual disturbance.   Respiratory:  Positive for cough. Negative for shortness of breath.    Cardiovascular:  Negative for chest pain and palpitations.   Gastrointestinal:  Negative for abdominal pain and vomiting.   Genitourinary:  Negative for dysuria and hematuria.   Musculoskeletal:  Negative for arthralgias and back pain.   Skin:  Negative for color change and rash.   Neurological:  Negative for seizures and syncope.   All other systems reviewed and are negative.        Current Medications       Current Outpatient Medications:   •  albuterol (2.5 mg/3 mL) 0.083 % nebulizer solution, Take 3 mL (2.5 mg total) by nebulization every 6 (six) hours as needed for wheezing or shortness of breath, Disp: 180 mL, Rfl: 5  •  albuterol (Proventil HFA) 90 mcg/act inhaler, Inhale 2 puffs every 6 (six) hours as needed for wheezing, Disp: 6.7 g, Rfl: 5  •  aspirin 81 mg chewable tablet, Chew 81 mg daily, Disp: , Rfl:   •  cholecalciferol 400 units tablet, Take 400 Units by mouth daily, Disp: , Rfl:   •  Prenatal MV-Min-Fe Fum-FA-DHA (PRENATAL 1 PO), Take by mouth, Disp: , Rfl:   •  Progesterone 200 MG CAPS, , Disp: , Rfl:     Current Allergies     Allergies as of 04/02/2024   • (No Known Allergies)            The following portions of the patient's history were reviewed and updated as appropriate: allergies, current medications, past family history, past medical history, past social history, past  "surgical history and problem list.     Past Medical History:   Diagnosis Date   • Allergic    • Anxiety     Diagnosis from [place] [facility] office   • Asthma    • GERD (gastroesophageal reflux disease)    • Headache(784.0)     Random       Past Surgical History:   Procedure Laterality Date   • APPENDECTOMY     •  SECTION         Family History   Problem Relation Age of Onset   • Diabetes Mother    • Asthma Mother    • Fibromyalgia Mother    • Arthritis Mother    • Arthritis Father    • Colon cancer Maternal Grandfather          Medications have been verified.        Objective   /93 (BP Location: Right arm, Patient Position: Sitting, Cuff Size: Standard)   Pulse 99   Temp 98.5 °F (36.9 °C) (Tympanic)   Resp 17   Ht 5' 4\" (1.626 m)   Wt 96 kg (211 lb 10.3 oz)   LMP 2023 (Exact Date)   SpO2 98%   BMI 36.33 kg/m²   Patient's last menstrual period was 2023 (exact date).       Physical Exam     Physical Exam  Vitals and nursing note reviewed.   Constitutional:       Appearance: Normal appearance.   HENT:      Head: Normocephalic and atraumatic.      Right Ear: Tympanic membrane normal.      Left Ear: Tympanic membrane normal.      Nose: Congestion and rhinorrhea present.      Mouth/Throat:      Mouth: Mucous membranes are moist.      Pharynx: No oropharyngeal exudate or posterior oropharyngeal erythema.   Eyes:      Pupils: Pupils are equal, round, and reactive to light.   Pulmonary:      Effort: Pulmonary effort is normal.      Breath sounds: Normal breath sounds. No wheezing or rhonchi.   Skin:     General: Skin is warm and dry.      Capillary Refill: Capillary refill takes less than 2 seconds.   Neurological:      General: No focal deficit present.      Mental Status: She is alert and oriented to person, place, and time. Mental status is at baseline.      Sensory: No sensory deficit.      Motor: No weakness.   Psychiatric:         Mood and Affect: Mood normal.         " Behavior: Behavior normal.         Thought Content: Thought content normal.

## 2024-04-06 ENCOUNTER — OB ABSTRACT (OUTPATIENT)
Dept: OBGYN CLINIC | Facility: CLINIC | Age: 39
End: 2024-04-06

## 2024-04-12 ENCOUNTER — TELEPHONE (OUTPATIENT)
Age: 39
End: 2024-04-12

## 2024-04-12 DIAGNOSIS — O21.9 NAUSEA AND VOMITING IN PREGNANCY: Primary | ICD-10-CM

## 2024-04-12 RX ORDER — METOCLOPRAMIDE 10 MG/1
10 TABLET ORAL 4 TIMES DAILY
Qty: 30 TABLET | Refills: 0 | Status: SHIPPED | OUTPATIENT
Start: 2024-04-12

## 2024-04-12 NOTE — LETTER
April 12, 2024     Patient: Darlin Mckinney   YOB: 1985   Date of Visit: 4/12/2024       To Whom It May Concern:    Darlin is under my office's care for pregnancy.   Please excuse her from work/ jury duty for 4/7/24-4/8/24 due to pregnancy complications    If you have any questions or concerns, please don't hesitate to call.         Sincerely,        Ran COLVIN, PADianneC  [facility]  [facility]:  [address] [address] [id number]    [place]:  [address]  [address]  [facility] [place] [address]    Phone: [phone]  Fax: [phone]

## 2024-04-15 NOTE — PROGRESS NOTES
38 y.o. y/o female here for OB intake.     TV u/s done 3/14/24 confirms SLIUP 6w4d, final EDC 11/3/24 by US thru fertility specialist.        Current Outpatient Medications on File Prior to Visit   Medication Sig Dispense Refill    albuterol (2.5 mg/3 mL) 0.083 % nebulizer solution Take 3 mL (2.5 mg total) by nebulization every 6 (six) hours as needed for wheezing or shortness of breath 180 mL 5    albuterol (Proventil HFA) 90 mcg/act inhaler Inhale 2 puffs every 6 (six) hours as needed for wheezing 6.7 g 5    aspirin 81 mg chewable tablet Chew 81 mg daily      cholecalciferol 400 units tablet Take 400 Units by mouth daily      metoclopramide (Reglan) 10 mg tablet Take 1 tablet (10 mg total) by mouth 4 (four) times a day As needed for nausea 30 tablet 0    Prenatal MV-Min-Fe Fum-FA-DHA (PRENATAL 1 PO) Take by mouth      [DISCONTINUED] Progesterone 200 MG CAPS  (Patient not taking: Reported on 2024)       No current facility-administered medications on file prior to visit.       Past Surgical History:   Procedure Laterality Date    APPENDECTOMY       SECTION         Past Medical History:   Diagnosis Date    Allergic     Anxiety     Diagnosis from [place] [facility] office    Asthma     GERD (gastroesophageal reflux disease)     Headache(784.0)     Random         Genetic screen:  Canavan disease- negative  Cerebral palsy- negative  Cleft lip/palate- negative  Congenital anomalies- negative  Congenital heart disease- negative  Consanguinity- negative  Cystic fibrosis- negative  Down's syndrome- negative  Hemophilia- negative  Bath's chorea- negative  Mental retardation/ intellectual disability/ cognitive delays- negative  Muscular dystrophy- negative  Neural tube defect- negative  Sickle cell anemia- negative  Dominguez-sachs disease- negative  Fragile X- negative  Thalassemia- negative  Autism- negative  Type 1 diabetes- negative  PKU- negative  Premature ovarian failure- negative      OB  History          2    Para   1    Term   1            AB        Living   1         SAB        IAB        Ectopic        Multiple        Live Births   1                 Previous pregnancy history/ complications: csection due to fetal intolerance    Age of patient: 38  Age of father of the baby: 37, Alban Mckinney    Exposure risk:  Occupation: [employer],    Exposure chemicals/radiation:no  Alcohol exposure: no  Medications taking since LMP:see med list  Drug exposure:no  Smoker: no  Cat litter exposure: no    Depression screen:  negative  Domestic violence screen: negative      TB screening:   HIV-no  Close contact with individuals with known or suspected TB-no  Medical conditions known to increase risk of disease if infected  Ie. Diabetes, lupus, cancer, alcoholism, drug addiction- no  Birth in or emigration from high prevalent countries ([place], [place], [place], [place], [place], [place], [place], [place], [place]- no  Medically underserved- no  Homeless- no  Living or working in long term care facilities - no    DRUG screening:  Parents:  Did any of your parents have a problem with alcohol or other drug use?  no    Partner: Does your partner have a problem with alcohol or drug use? no    Past: In the past, have you had difficulties in your life because of alcohol or other drugs, including prescription medication?  no    Present: In the past month have you drunk any alcohol or used other drugs?  no    Peers: Do any of your peers (friends, roommates, co-workers, etc) have a problem with alcohol or drug use? no    Cravings:  During this pregnancy, have you experienced cravings for substances such as opioids or methamphetamines? no    Substitute: During this pregnancy, have you taken or purchased buprenorphine (subutex or suboxone) that was prescribed to someone else? no    Thyroid disease risk:  Hx of thyroid disease: no    Diabetes risk screening:     If patient has one or more  order early glucola  Hx of GDM: no  BMI > 35: yes  Hx of PCOS or current metformin use: no  Hx of LGA/ macrosomic infant (4000g/9lbs): no    If patient has two or more order early glucola  BMI > 30: yes  AMA: yes  First degree relative with type 2 diabetes: yes  Hx of chronic HTN: no  Hx of hyperlipidemia: no  Elevated HgbA1c: no  , , ,  or : no    Glucola ordered: yes    Other screening:  Ashkenazi Lutheran/ [place]/ [place] descent: no, ernestina-sachs screening offered: no    Hx of gastric bypass/ gastric sleeve/ gastric band: no    Hx of HSV for patient or partner: no    Hx of MRSA: no    Last pap: 11/1/21  Hx of abnormal pap smear: yes,  Hx of procedures to the cervix: colpo only    Hx of chlamydia/ gonorrhea/ PID: no    Hx recurrent pregnancy loss/ stillbirth: no    Was this pregnancy a result of infertility treatment: yes, IVF frozen embryo    Vaccine Hx:  Varicella: + disease and vaccine  Flu vaccine: not completed  Hep B vaccine: ?   COVID vaccine :  not completed     Hx of covid in last 3 months: no    ASA/ PEC screen:  Previous uncomplicated full-term delivery: yes  Multifetal gestation- 2 points: 0  Chronic HTN- 2 points: 0  Type 1 or 2 pre-gestational diabetes- 2 points: 0  Renal disease- 2 points: 0  Autoimmune disease- 2 points: 0  History of preeclampsia- 2 points: 0  IVF pregnancy- 1 point: 0  >10 year pregnancy interval-1 point: 1  Previous pregnancy with IUGR/ SGA/ adverse pregnancy outcome-1 point: 0  Nulliparity- 1 point: 0  BMI >30- 1 point: 1  Family history of preeclampsia in mother or sister- 1 point: 0  Age >or = 35- 1 point: 1   Race- 1 point: 0  Low socioeconomic status-1 point: 0    TOTAL SCORE: 3    Pertinent + Pre eclampsia risk factors: AMA, BMI >30  Pt has been recommended to take ASA 162mg during this pregnancy to lower her risk of preeclampsia: yes    Other:  Pre pregnancy weight: 213lb     Ok with blood  transfusion if needed: yes    Plans for feeding baby: breast    Blood type: A + per pt    Hemoglobin electrophoresis completed in past: no    Preferred lab: 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       Prenatal course discussed with patient, including appointment schedule. Warning signs discussed and reviewed.  OB folder  given with warning signs of pregnancy, prenatal visit schedule, safe medications in pregnancy, childbirth classes, lab location info, MFM info.    -Genetic testing: nuchal translucency/Sequential screening/ NIPT reviewed with patient - appt with MFM scheduled 4/23/24    -patient has had carrier testing in past.    -OB exam: to be completed today, see other encounter

## 2024-04-15 NOTE — PROGRESS NOTES
38 y.o. y/o female here for New OB exam.  She is 11w2d pregnant.  OB intake done prior, see other encounter.      TVUS 3/14/24 confirms SLIUP 6w4d, final EDC US from infertility specialist.        Prenatal labs ordered.  Carrier screen completed in past  NT scheduled with MFM: 24    ROS:  Nausea: improved with reglan  Vomiting: no  Cramping/ pain: no  Bleeding since pregnant: no  Urinary complaints: no  Fever or rash since pregnant: no    Exposure chemicals/radiation:no  Alcohol exposure: no  Medications taking since LMP:no  Drug exposure:no  Smoker: no     ASA/ PEC screen: 3,  162 ASA recommended. yes    Blood type: A positive per pt    Last pap: 24      Current Outpatient Medications on File Prior to Visit   Medication Sig Dispense Refill    albuterol (2.5 mg/3 mL) 0.083 % nebulizer solution Take 3 mL (2.5 mg total) by nebulization every 6 (six) hours as needed for wheezing or shortness of breath 180 mL 5    albuterol (Proventil HFA) 90 mcg/act inhaler Inhale 2 puffs every 6 (six) hours as needed for wheezing 6.7 g 5    aspirin 81 mg chewable tablet Chew 81 mg daily      cholecalciferol 400 units tablet Take 400 Units by mouth daily      metoclopramide (Reglan) 10 mg tablet Take 1 tablet (10 mg total) by mouth 4 (four) times a day As needed for nausea 30 tablet 0    Prenatal MV-Min-Fe Fum-FA-DHA (PRENATAL 1 PO) Take by mouth      Progesterone 200 MG CAPS  (Patient not taking: Reported on 2024)       No current facility-administered medications on file prior to visit.       Past Surgical History:   Procedure Laterality Date    APPENDECTOMY       SECTION         Past Medical History:   Diagnosis Date    Allergic     Anxiety     Diagnosis from [place] office    Asthma 1997    GERD (gastroesophageal reflux disease) 2009    Headache(784.0)     Random       Vitals:    24 0657   BP: 122/76   Pulse: 93   SpO2: 98%       Neck: supple without nodes or thyromegaly  Heart: regular rate  and rhythm  Lungs: clear to auscultation without rales, rhonci  Breasts: nontender, no masses, no discoloration, no discharge  Abdomen: soft, nontender, no masses  Ext genitalia: no lesions, no discoloration  Urethra: no discharge or erythema  Bladder: nontender, good support  Vagina: no discharge, no lesions  Cervix: no lesions, no cervical motion tenderness, posterior, long, closed  Adnexa: nontender, no masses  Uterus: nontender, 11 wk size    FHR: 168 by US unable to hear fetal heart tones with doppler    First TRIMESTER OBSTETRIC ULTRASOUND  04/16/24      Ran Farnsworth PA-C       INDICATION: inability to hear fetal heart tones with doppler    COMPARISON: None.     TECHNIQUE:   Transabdominal imaging was performed to assess fetal heart rate       FINDINGS:     A single intrauterine gestation is identified.  Pregnancy location:  intrauterine  Cardiac activity observed  Fetal heart rate: 168           PROBLEM LIST includes:    AMA  BMI 37- early glucola ordered  Asthma- albuteral inhaler as needed  Depression/ anxiety: no meds x years  GERD  IVF pregnancy, frozen embryo with + PGT  Hx of csection- due to fetal intolerance.  ? Reaction to epidural.  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 PN Labs ordered      Prenatal course discussed with patient, including appointment schedule. Warning signs discussed and reviewed.  OB folder given with warning signs of pregnancy, prenatal visit schedule, safe medications in pregnancy, childbirth classes, lab location info, MFM info.    -Screening: Pap smear not indicated today. GC/CT collected.     -Genetic/carrier testing: appt with MF  scheduled 4/23/24.   Carrier testing has been completed      - Follow up: Return in 4 weeks.

## 2024-04-16 ENCOUNTER — INITIAL PRENATAL (OUTPATIENT)
Dept: OBGYN CLINIC | Facility: CLINIC | Age: 39
End: 2024-04-16

## 2024-04-16 VITALS
HEIGHT: 64 IN | BODY MASS INDEX: 36.33 KG/M2 | SYSTOLIC BLOOD PRESSURE: 122 MMHG | HEART RATE: 93 BPM | OXYGEN SATURATION: 98 % | DIASTOLIC BLOOD PRESSURE: 76 MMHG

## 2024-04-16 DIAGNOSIS — Z98.891 HISTORY OF C-SECTION: ICD-10-CM

## 2024-04-16 DIAGNOSIS — F41.9 ANXIETY: ICD-10-CM

## 2024-04-16 DIAGNOSIS — E66.9 OBESITY (BMI 30-39.9): ICD-10-CM

## 2024-04-16 DIAGNOSIS — Z11.3 SCREEN FOR STD (SEXUALLY TRANSMITTED DISEASE): ICD-10-CM

## 2024-04-16 DIAGNOSIS — Z14.8 CARRIER OF HEREDITARY DISEASE: ICD-10-CM

## 2024-04-16 DIAGNOSIS — O09.529 ANTEPARTUM MULTIGRAVIDA OF ADVANCED MATERNAL AGE: ICD-10-CM

## 2024-04-16 DIAGNOSIS — O36.8390 ULTRASOUND SCAN DONE FOR INABILITY TO HEAR FETAL HEART TONES: Primary | ICD-10-CM

## 2024-04-16 DIAGNOSIS — O09.891 SUPERVISION OF OTHER HIGH RISK PREGNANCIES, FIRST TRIMESTER: Primary | ICD-10-CM

## 2024-04-16 DIAGNOSIS — O09.819 PREGNANCY RESULTING FROM ASSISTED REPRODUCTIVE TECHNOLOGY, ANTEPARTUM: ICD-10-CM

## 2024-04-16 DIAGNOSIS — Z13.71 SCREENING FOR GENETIC DISEASE CARRIER STATUS: ICD-10-CM

## 2024-04-16 DIAGNOSIS — J45.909 ASTHMA, UNSPECIFIED ASTHMA SEVERITY, UNSPECIFIED WHETHER COMPLICATED, UNSPECIFIED WHETHER PERSISTENT: ICD-10-CM

## 2024-04-16 DIAGNOSIS — Z3A.11 11 WEEKS GESTATION OF PREGNANCY: ICD-10-CM

## 2024-04-16 PROCEDURE — OBC: Performed by: PHYSICIAN ASSISTANT

## 2024-04-16 PROCEDURE — 87491 CHLMYD TRACH DNA AMP PROBE: CPT | Performed by: PHYSICIAN ASSISTANT

## 2024-04-16 PROCEDURE — 87591 N.GONORRHOEAE DNA AMP PROB: CPT | Performed by: PHYSICIAN ASSISTANT

## 2024-04-16 PROCEDURE — PNV: Performed by: PHYSICIAN ASSISTANT

## 2024-04-17 LAB
C TRACH DNA SPEC QL NAA+PROBE: NEGATIVE
N GONORRHOEA DNA SPEC QL NAA+PROBE: NEGATIVE

## 2024-04-23 ENCOUNTER — ROUTINE PRENATAL (OUTPATIENT)
Dept: PERINATAL CARE | Facility: OTHER | Age: 39
End: 2024-04-23
Payer: COMMERCIAL

## 2024-04-23 VITALS
HEART RATE: 109 BPM | BODY MASS INDEX: 37.36 KG/M2 | WEIGHT: 218.8 LBS | SYSTOLIC BLOOD PRESSURE: 136 MMHG | DIASTOLIC BLOOD PRESSURE: 72 MMHG | HEIGHT: 64 IN

## 2024-04-23 DIAGNOSIS — E66.09 OTHER OBESITY DUE TO EXCESS CALORIES AFFECTING PREGNANCY, ANTEPARTUM: ICD-10-CM

## 2024-04-23 DIAGNOSIS — Z3A.12 12 WEEKS GESTATION OF PREGNANCY: ICD-10-CM

## 2024-04-23 DIAGNOSIS — O09.529 ANTEPARTUM MULTIGRAVIDA OF ADVANCED MATERNAL AGE: ICD-10-CM

## 2024-04-23 DIAGNOSIS — Z36.82 ENCOUNTER FOR NUCHAL TRANSLUCENCY TESTING: ICD-10-CM

## 2024-04-23 DIAGNOSIS — N91.2 AMENORRHEA: ICD-10-CM

## 2024-04-23 DIAGNOSIS — O09.819 PREGNANCY RESULTING FROM ASSISTED REPRODUCTIVE TECHNOLOGY, ANTEPARTUM: Primary | ICD-10-CM

## 2024-04-23 DIAGNOSIS — O34.211 MATERNAL CARE DUE TO LOW TRANSVERSE UTERINE SCAR FROM PREVIOUS CESAREAN DELIVERY: ICD-10-CM

## 2024-04-23 DIAGNOSIS — O99.210 OTHER OBESITY DUE TO EXCESS CALORIES AFFECTING PREGNANCY, ANTEPARTUM: ICD-10-CM

## 2024-04-23 PROCEDURE — 76801 OB US < 14 WKS SINGLE FETUS: CPT | Performed by: OBSTETRICS & GYNECOLOGY

## 2024-04-23 PROCEDURE — 76813 OB US NUCHAL MEAS 1 GEST: CPT | Performed by: OBSTETRICS & GYNECOLOGY

## 2024-04-23 PROCEDURE — 99244 OFF/OP CNSLTJ NEW/EST MOD 40: CPT | Performed by: OBSTETRICS & GYNECOLOGY

## 2024-04-23 NOTE — PROGRESS NOTES
Darlin presents today for a genetic screening ultrasound.  This is her second pregnancy.  First pregnancy resulted in a  delivery.  Conception was by in vitro fertilization with PGT A.  [place] carrier screening was performed and patient and  have findings that are discordant therefore reproductive risk is low.  She currently takes aspirin for preeclampsia risk reduction.  She has a BMI of 37.  Substance use history and family medical history are otherwise noncontributory.  A review of systems is otherwise negative.    We discussed the options for genetic screening.  At the conclusion that discussion, the couple elected to forego additional screening given genetic testing and screening that has already been performed.    Conception by in vitro fertilization carries additional risks of adverse pregnancy outcomes.  Some of those risks are confounded by medical conditions which lead to infertility (age, BMI, uterine abnormalities); however conception by IVF appears to be an independent risk factor as well.  Maternal risks include hypertensive disorders of pregnancy, abnormal placentation,  birth,  section and an increased risk for venous thromboembolism as well as a 40% increased risk of severe maternal morbidity.  Fetal risks include, but are not limited to, an increased risk for congenital birth defects, specifically congenital heart defects, fetal growth restriction, and a slight increased risk for stillbirth.  Additional screening ultrasounds are recommend to include, but are not limited to the following: a fetal echocardiogram between 22 and 24 weeks, an early-mid third trimester ultrasound, and at miniumim weekly NST/AFIs at around 36 weeks gestation.    The implications of obesity and pregnancy are significant.  The level of obesity is directly related to the risk of adverse pregnancy outcomes including but not limited to, risk of diabetes, hypertensive disorders of pregnancy,  LVM to call back to schedule f/u for further refills   "macrosomia, intrauterine growth restriction, labor and shoulder dystocias,  section, and increased risk of stillbirth.  Recommend discussing the current weight gain recommendations for women with obesity and discussing good dietary practices as well as the safety of exercise in pregnancy. I recommend the patient gain no more than 11-20 pounds throughout her entire pregnancy, increase her exercise and follow healthy dietary habits.  Consider referral to a dietitian should the patient have difficulty following the aforementioned recommendations.  Recommend third trimester growth ultrasounds to screen for fetal growth problems as well as ensuring the patient is appropriately screened for pregestational and gestational diabetes.  Additional  surveillance is recommended starting at 36 weeks in those women with a BMI of greater than 40 given the increased risk for stillbirth.    We discussed follow-up in detail and I recommend an anatomy ultrasound be scheduled for 20 weeks gestation.    Thank you very much for allowing us to participate in the care of this very nice patient. Should you have any questions, please do not hesitate to contact our office.    Portions of the record may have been created with voice recognition software. Occasional wrong word or \"sound a like\" substitutions may have occurred due to the inherent limitations of voice recognition software. Read the chart carefully and recognize, using context, where substitutions have occurred.  "

## 2024-04-29 ENCOUNTER — NURSE TRIAGE (OUTPATIENT)
Age: 39
End: 2024-04-29

## 2024-04-29 ENCOUNTER — TELEPHONE (OUTPATIENT)
Age: 39
End: 2024-04-29

## 2024-04-29 DIAGNOSIS — K21.9 GASTROESOPHAGEAL REFLUX DISEASE WITHOUT ESOPHAGITIS: ICD-10-CM

## 2024-04-29 RX ORDER — OMEPRAZOLE 20 MG/1
20 CAPSULE, DELAYED RELEASE ORAL DAILY
Qty: 90 CAPSULE | Refills: 3 | Status: SHIPPED | OUTPATIENT
Start: 2024-04-29

## 2024-04-29 NOTE — TELEPHONE ENCOUNTER
Spoke to patient to review message from provider.      Patient spoke to OB/GYN already and they stated it was ok for patient to take.  She is contacting us because it was prescribed by PCP.      Patient did not take during IVF and did not get refill.      Patient is 3  months pregnant now and states she needs the medication.

## 2024-04-29 NOTE — TELEPHONE ENCOUNTER
Tried to call patient to review message from provider.  Phone rang and stopped with message person trying to reach is on another call.  Will try again later.

## 2024-04-29 NOTE — TELEPHONE ENCOUNTER
"Pt reports worsening heartburn. Had a hx of it prior to pregnancy and took omeprazole. Stopped taking when she became pregnant, switched to pepcid. She is taking pepcid twice a day but symptoms worsened this past weekend. Advised that she can take omeprazole in pregnancy. She should call back if that does not help. She verbalized understanding and is thankful.     Reason for Disposition   Mild abdominal pain    Answer Assessment - Initial Assessment Questions  1. LOCATION: \"Where does it hurt?\"       Upper abdomen/chest  2. RADIATION: \"Does the pain shoot anywhere else?\" (e.g., chest, back)      denies  3. ONSET: \"When did the pain begin?\" (e.g., minutes, hours or days ago)       Worsened this past weekend  4. SUDDEN: \"Gradual or sudden onset?\"      gradual  5. PATTERN \"Does the pain come and go, or is it constant?\"     - If constant: \"Is it getting better, staying the same, or worsening?\"       (Note: Constant means the pain never goes away completely; most serious pain is constant and it progresses)      - If intermittent: \"How long does it last?\" \"Do you have pain now?\"      (Note: Intermittent means the pain goes away completely between bouts)      constant  6. SEVERITY: \"How bad is the pain?\"  (e.g., Scale 1-10; mild, moderate, or severe)     - MILD (1-3): doesn't interfere with normal activities, abdomen soft and not tender to touch      - MODERATE (4-7): interferes with normal activities or awakens from sleep, tender to touch      - SEVERE (8-10): excruciating pain, doubled over, unable to do any normal activities        moderate  7. RECURRENT SYMPTOM: \"Have you ever had this type of stomach pain before?\" If Yes, ask: \"When was the last time?\" and \"What happened that time?\"       Yes, hx of heart burn  8. AGGRAVATING FACTORS: \"Does anything seem to cause this pain?\" (e.g., foods, stress, alcohol)      denies  9. CARDIAC SYMPTOMS: \"Do you have any of the following symptoms: chest pain, difficulty breathing, " "sweating, nausea?\"      denies  10. OTHER SYMPTOMS: \"Do you have any other symptoms?\" (e.g., fever, vomiting, diarrhea)        denies  11. PREGNANCY: \"Is there any chance you are pregnant?\" \"When was your last menstrual period?\"        13w1d    Protocols used: Abdominal Pain - Upper-ADULT-[place]    "

## 2024-05-15 ENCOUNTER — TELEPHONE (OUTPATIENT)
Dept: OBGYN CLINIC | Facility: CLINIC | Age: 39
End: 2024-05-15

## 2024-05-15 ENCOUNTER — APPOINTMENT (OUTPATIENT)
Age: 39
End: 2024-05-15
Payer: COMMERCIAL

## 2024-05-15 DIAGNOSIS — Z3A.11 11 WEEKS GESTATION OF PREGNANCY: ICD-10-CM

## 2024-05-15 DIAGNOSIS — O09.891 SUPERVISION OF OTHER HIGH RISK PREGNANCIES, FIRST TRIMESTER: ICD-10-CM

## 2024-05-15 DIAGNOSIS — R73.09 ABNORMAL GLUCOSE TOLERANCE TEST: Primary | ICD-10-CM

## 2024-05-15 PROBLEM — O99.810 ABNORMAL MATERNAL GLUCOSE TOLERANCE, ANTEPARTUM: Status: ACTIVE | Noted: 2024-05-15

## 2024-05-15 LAB
ABO GROUP BLD: NORMAL
AMORPH URATE CRY URNS QL MICRO: ABNORMAL
BACTERIA UR QL AUTO: ABNORMAL /HPF
BASOPHILS # BLD AUTO: 0.02 THOUSANDS/ÂΜL (ref 0–0.1)
BASOPHILS NFR BLD AUTO: 0 % (ref 0–1)
BILIRUB UR QL STRIP: NEGATIVE
BLD GP AB SCN SERPL QL: NEGATIVE
CLARITY UR: ABNORMAL
COLOR UR: ABNORMAL
EOSINOPHIL # BLD AUTO: 0.04 THOUSAND/ÂΜL (ref 0–0.61)
EOSINOPHIL NFR BLD AUTO: 1 % (ref 0–6)
ERYTHROCYTE [DISTWIDTH] IN BLOOD BY AUTOMATED COUNT: 13.2 % (ref 11.6–15.1)
GLUCOSE 1H P CHAL SERPL-MCNC: 144 MG/DL
GLUCOSE UR STRIP-MCNC: ABNORMAL MG/DL
HCT VFR BLD AUTO: 34.1 % (ref 34.8–46.1)
HGB BLD-MCNC: 11.4 G/DL (ref 11.5–15.4)
HGB UR QL STRIP.AUTO: NEGATIVE
HIV 1+2 AB+HIV1 P24 AG SERPL QL IA: NORMAL
HIV 2 AB SERPL QL IA: NORMAL
HIV1 AB SERPL QL IA: NORMAL
HIV1 P24 AG SERPL QL IA: NORMAL
IMM GRANULOCYTES # BLD AUTO: 0.03 THOUSAND/UL (ref 0–0.2)
IMM GRANULOCYTES NFR BLD AUTO: 0 % (ref 0–2)
KETONES UR STRIP-MCNC: NEGATIVE MG/DL
LEUKOCYTE ESTERASE UR QL STRIP: NEGATIVE
LYMPHOCYTES # BLD AUTO: 1.84 THOUSANDS/ÂΜL (ref 0.6–4.47)
LYMPHOCYTES NFR BLD AUTO: 21 % (ref 14–44)
MCH RBC QN AUTO: 31.9 PG (ref 26.8–34.3)
MCHC RBC AUTO-ENTMCNC: 33.4 G/DL (ref 31.4–37.4)
MCV RBC AUTO: 96 FL (ref 82–98)
MONOCYTES # BLD AUTO: 0.46 THOUSAND/ÂΜL (ref 0.17–1.22)
MONOCYTES NFR BLD AUTO: 5 % (ref 4–12)
MUCOUS THREADS UR QL AUTO: ABNORMAL
NEUTROPHILS # BLD AUTO: 6.49 THOUSANDS/ÂΜL (ref 1.85–7.62)
NEUTS SEG NFR BLD AUTO: 73 % (ref 43–75)
NITRITE UR QL STRIP: NEGATIVE
NON-SQ EPI CELLS URNS QL MICRO: ABNORMAL /HPF
NRBC BLD AUTO-RTO: 0 /100 WBCS
PH UR STRIP.AUTO: 6 [PH]
PLATELET # BLD AUTO: 246 THOUSANDS/UL (ref 149–390)
PMV BLD AUTO: 10.5 FL (ref 8.9–12.7)
PROT UR STRIP-MCNC: ABNORMAL MG/DL
RBC # BLD AUTO: 3.57 MILLION/UL (ref 3.81–5.12)
RBC #/AREA URNS AUTO: ABNORMAL /HPF
RH BLD: POSITIVE
RUBV IGG SERPL IA-ACNC: 44.4 IU/ML
SP GR UR STRIP.AUTO: 1.03 (ref 1–1.03)
SPECIMEN EXPIRATION DATE: NORMAL
TREPONEMA PALLIDUM IGG+IGM AB [PRESENCE] IN SERUM OR PLASMA BY IMMUNOASSAY: NORMAL
UROBILINOGEN UR STRIP-ACNC: <2 MG/DL
WBC # BLD AUTO: 8.88 THOUSAND/UL (ref 4.31–10.16)
WBC #/AREA URNS AUTO: ABNORMAL /HPF

## 2024-05-15 PROCEDURE — 86850 RBC ANTIBODY SCREEN: CPT

## 2024-05-15 PROCEDURE — 87077 CULTURE AEROBIC IDENTIFY: CPT

## 2024-05-15 PROCEDURE — 86901 BLOOD TYPING SEROLOGIC RH(D): CPT

## 2024-05-15 PROCEDURE — 85025 COMPLETE CBC W/AUTO DIFF WBC: CPT

## 2024-05-15 PROCEDURE — 87389 HIV-1 AG W/HIV-1&-2 AB AG IA: CPT

## 2024-05-15 PROCEDURE — 86780 TREPONEMA PALLIDUM: CPT

## 2024-05-15 PROCEDURE — 81001 URINALYSIS AUTO W/SCOPE: CPT

## 2024-05-15 PROCEDURE — 83020 HEMOGLOBIN ELECTROPHORESIS: CPT

## 2024-05-15 PROCEDURE — 86803 HEPATITIS C AB TEST: CPT

## 2024-05-15 PROCEDURE — 87340 HEPATITIS B SURFACE AG IA: CPT

## 2024-05-15 PROCEDURE — 86706 HEP B SURFACE ANTIBODY: CPT

## 2024-05-15 PROCEDURE — 87086 URINE CULTURE/COLONY COUNT: CPT

## 2024-05-15 PROCEDURE — 86900 BLOOD TYPING SEROLOGIC ABO: CPT

## 2024-05-15 PROCEDURE — 86762 RUBELLA ANTIBODY: CPT

## 2024-05-15 PROCEDURE — 36415 COLL VENOUS BLD VENIPUNCTURE: CPT

## 2024-05-15 PROCEDURE — 87186 SC STD MICRODIL/AGAR DIL: CPT

## 2024-05-16 ENCOUNTER — TELEPHONE (OUTPATIENT)
Dept: PSYCHIATRY | Facility: CLINIC | Age: 39
End: 2024-05-16

## 2024-05-16 LAB
HBV SURFACE AB SER-ACNC: >500 MIU/ML
HBV SURFACE AG SER QL: NORMAL
HCV AB SER QL: NORMAL

## 2024-05-17 ENCOUNTER — ROUTINE PRENATAL (OUTPATIENT)
Age: 39
End: 2024-05-17

## 2024-05-17 VITALS
SYSTOLIC BLOOD PRESSURE: 122 MMHG | WEIGHT: 220.6 LBS | DIASTOLIC BLOOD PRESSURE: 80 MMHG | OXYGEN SATURATION: 98 % | HEIGHT: 64 IN | BODY MASS INDEX: 37.66 KG/M2 | HEART RATE: 104 BPM

## 2024-05-17 DIAGNOSIS — O23.42 UTI IN PREGNANCY, ANTEPARTUM, SECOND TRIMESTER: ICD-10-CM

## 2024-05-17 DIAGNOSIS — O34.211 MATERNAL CARE DUE TO LOW TRANSVERSE UTERINE SCAR FROM PREVIOUS CESAREAN DELIVERY: ICD-10-CM

## 2024-05-17 DIAGNOSIS — O09.529 ANTEPARTUM MULTIGRAVIDA OF ADVANCED MATERNAL AGE: ICD-10-CM

## 2024-05-17 DIAGNOSIS — Z14.8 CARRIER OF HEREDITARY DISEASE: ICD-10-CM

## 2024-05-17 DIAGNOSIS — Z3A.15 15 WEEKS GESTATION OF PREGNANCY: ICD-10-CM

## 2024-05-17 DIAGNOSIS — O99.810 ABNORMAL MATERNAL GLUCOSE TOLERANCE, ANTEPARTUM: ICD-10-CM

## 2024-05-17 DIAGNOSIS — O09.819 PREGNANCY RESULTING FROM ASSISTED REPRODUCTIVE TECHNOLOGY, ANTEPARTUM: ICD-10-CM

## 2024-05-17 DIAGNOSIS — O09.892 SUPERVISION OF OTHER HIGH RISK PREGNANCIES, SECOND TRIMESTER: Primary | ICD-10-CM

## 2024-05-17 LAB
BACTERIA UR CULT: ABNORMAL
BACTERIA UR CULT: ABNORMAL

## 2024-05-17 PROCEDURE — PNV: Performed by: PHYSICIAN ASSISTANT

## 2024-05-17 RX ORDER — NITROFURANTOIN 25; 75 MG/1; MG/1
100 CAPSULE ORAL 2 TIMES DAILY
Qty: 10 CAPSULE | Refills: 0 | Status: SHIPPED | OUTPATIENT
Start: 2024-05-17 | End: 2024-05-22

## 2024-05-17 NOTE — PATIENT INSTRUCTIONS
Open Neural Tube Defect Screening test has been ordered (MsAFP)     The neural tube develops into the baby's brain and spinal cord very early in pregnancy. If the tube does not close completely, an opening remains along part of the spine or head. This can lead to paralysis and other physical and/or mental problems. Open neural tube defects occur in approximately 1 in 5000 live births. The risk of open neural tube defects is not affected by the mother's age. This blood draw should be done between 16-18 weeks of pregnancy.

## 2024-05-17 NOTE — PROGRESS NOTES
15w5d pregnant female, here for prenatal visit. Pt well, without complaints.  PNL reviewed.     Urine culture that resulted today is + for UTI.  No burning with urination.  Has had some cramping.  No fever, no back pian.  >100,000 cfu/ml Enterococcus faecalis Abnormal       20,000-29,000 cfu/ml   Mixed Contaminants X3        Susceptibility       Enterococcus faecalis     AMY     Ampicillin ($$) <=2.00 ug/ml Susceptible     Levofloxacin ($) <=1.00 ug/ml Susceptible     Nitrofurantoin <=32 ug/ml Susceptible     Tetracycline >8 ug/ml Resistant     Vancomycin ($) 2.00 ug/ml Susceptible                 1 hour glucola elevated (144), 3 hour GTT ordered.    MsAFP: ordered    Bleeding: no  Nausea/ vomiting: no        Current Outpatient Medications:     albuterol (2.5 mg/3 mL) 0.083 % nebulizer solution, Take 3 mL (2.5 mg total) by nebulization every 6 (six) hours as needed for wheezing or shortness of breath, Disp: 180 mL, Rfl: 5    albuterol (Proventil HFA) 90 mcg/act inhaler, Inhale 2 puffs every 6 (six) hours as needed for wheezing, Disp: 6.7 g, Rfl: 5    aspirin 81 mg chewable tablet, Chew 81 mg daily, Disp: , Rfl:     cholecalciferol 400 units tablet, Take 400 Units by mouth daily, Disp: , Rfl:     metoclopramide (Reglan) 10 mg tablet, Take 1 tablet (10 mg total) by mouth 4 (four) times a day As needed for nausea, Disp: 30 tablet, Rfl: 0    omeprazole (PriLOSEC) 20 mg delayed release capsule, Take 1 capsule (20 mg total) by mouth daily, Disp: 90 capsule, Rfl: 3    Prenatal MV-Min-Fe Fum-FA-DHA (PRENATAL 1 PO), Take by mouth, Disp: , Rfl:       Pregravid Weight/BMI: 96.6 kg (213 lb) (BMI 36.54)  Current Weight:     Total Weight Gain: 3.447 kg (7 lb 9.6 oz)          Vitals:    05/17/24 1356   BP: 122/80   Pulse: 104   SpO2: 98%       Fundal height: 15  Fetal Heart Rate: 154    Review of Systems   Constitutional: Negative.    HENT: Negative.    Eyes: Negative.    Respiratory: Negative.    Cardiovascular: Negative.     Gastrointestinal: Negative.    Endocrine: Negative.    Genitourinary:        As noted in HPI   Musculoskeletal: Negative.    Skin: Negative.    Allergic/Immunologic: Negative.    Neurological: Negative.    Hematological: Negative.    Psychiatric/Behavioral: Negative.             Physical Exam  Constitutional:       General: She is not in acute distress.     Appearance: She is well-developed.   Abdominal:      Palpations: Abdomen is soft.      Tenderness: There is no abdominal tenderness. There is no guarding.   Neurological:      Mental Status: She is alert and oriented to person, place, and time.   Skin:     General: Skin is warm and dry.   Psychiatric:         Behavior: Behavior normal.    No CVA tenderness    Problem List          Respiratory    Asthma    Overview     Albuteral inhaler PRN            Digestive    Gastroesophageal reflux disease without esophagitis       Endocrine    Abnormal maternal glucose tolerance, antepartum    Overview     Elevated early glucola,   3 hour glucola ordered            Genitourinary    Maternal care due to low transverse uterine scar from previous  delivery    Overview     Awaiting records  Likely wants TOLAC  ? Reaction to meds given w/csection:  rash on face            Behavioral Health    Moderate episode of recurrent major depressive disorder (HCC)    Anxiety    Overview     No meds X years            Obstetrics/Gynecology    Pregnancy resulting from assisted reproductive technology, antepartum    Overview     IVF pregnancy with frozen embryo, +PGT  Fetal echo_____  Growth US at 32  NSTs weekly @ 36  EMMANUEL weekly @ 36           Antepartum multigravida of advanced maternal age    15 weeks gestation of pregnancy    Obesity complicating pregnancy, childbirth, or puerperium, antepartum    Supervision of other high risk pregnancies, second trimester       Other    Obesity (BMI 30-39.9)    Overview     Pre gestational BMI:  36  Early glucola ordered              Seasonal allergies    Carrier of hereditary disease    Overview     + 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   Will plan FRANDY at next visit    MsAFP, 3 hour GTT ordered.  Ob visit in 4 weeks  US scheduled 6/28/24

## 2024-05-20 LAB
HGB A MFR BLD: 2.5 % (ref 1.8–3.2)
HGB A MFR BLD: 97.5 % (ref 96.4–98.8)
HGB F MFR BLD: 0 % (ref 0–2)
HGB FRACT BLD-IMP: NORMAL
HGB S MFR BLD: 0 %

## 2024-05-29 ENCOUNTER — OFFICE VISIT (OUTPATIENT)
Age: 39
End: 2024-05-29
Payer: COMMERCIAL

## 2024-05-29 VITALS
HEART RATE: 90 BPM | SYSTOLIC BLOOD PRESSURE: 100 MMHG | DIASTOLIC BLOOD PRESSURE: 72 MMHG | TEMPERATURE: 97.7 F | BODY MASS INDEX: 37.45 KG/M2 | WEIGHT: 219.36 LBS | OXYGEN SATURATION: 99 % | HEIGHT: 64 IN

## 2024-05-29 DIAGNOSIS — K21.9 GASTROESOPHAGEAL REFLUX DISEASE WITHOUT ESOPHAGITIS: ICD-10-CM

## 2024-05-29 DIAGNOSIS — J45.909 ASTHMA, UNSPECIFIED ASTHMA SEVERITY, UNSPECIFIED WHETHER COMPLICATED, UNSPECIFIED WHETHER PERSISTENT: ICD-10-CM

## 2024-05-29 DIAGNOSIS — Z00.00 ANNUAL PHYSICAL EXAM: Primary | ICD-10-CM

## 2024-05-29 DIAGNOSIS — Z3A.17 17 WEEKS GESTATION OF PREGNANCY: ICD-10-CM

## 2024-05-29 PROCEDURE — 99395 PREV VISIT EST AGE 18-39: CPT | Performed by: NURSE PRACTITIONER

## 2024-05-29 NOTE — PROGRESS NOTES
"Adult Annual Physical  Name: Darlin Mckinney      : 1985      MRN: [id number]  Encounter Provider: EDGARDO Brewer  Encounter Date: 2024   Encounter department: [facility]    Assessment & Plan     Patient currently 17 weeks pregnant, following with [facility]. Next f/u mid-.   Recent 1hr glucose test elevated, will need 3 hour test  BP currently well controlled. No edema noted. \"Feeling well\" overall.   Asthma well controlled with prn PHILLIP.   GERD well controlled with omeprazole.     Discussed what signs and symptoms warrant follow-up vs emergent medical care. Patient/family verbalized understanding.     Documentation for this encounter was completed with the assistance of dictation software.  Please excuse any grammatical errors.  Please contact the provider if any documentation clarification is necessary.      1. Annual physical exam  2. 17 weeks gestation of pregnancy  3. Asthma, unspecified asthma severity, unspecified whether complicated, unspecified whether persistent  4. Gastroesophageal reflux disease without esophagitis  Immunizations and preventive care screenings were discussed with patient today. Appropriate education was printed on patient's after visit summary.    Counseling:  Exercise: the importance of regular exercise/physical activity was discussed. Recommend exercise 3-5 times per week for at least 30 minutes.          History of Present Illness   {Disappearing Hyperlinks I Encounters * My Last Note * Since Last Visit * History :58128}  Adult Annual Physical:  Patient presents for annual physical.     Diet and Physical Activity:  - Diet/Nutrition: well balanced diet. small frequent meals  - Exercise: walking.    General Health:  - Sleep: sleeps well. sleep varies night to night  - Hearing: normal hearing right ear.  - Vision: wears glasses.  - Dental: regular dental visits.    /GYN Health:  - Follows with GYN: yes.     Review of Systems " "  Constitutional: Negative.    HENT: Negative.     Eyes: Negative.    Respiratory: Negative.     Cardiovascular: Negative.    Gastrointestinal: Negative.    Endocrine: Negative.    Genitourinary: Negative.    Musculoskeletal: Negative.    Skin: Negative.    Allergic/Immunologic: Negative.    Neurological: Negative.    Hematological: Negative.    Psychiatric/Behavioral: Negative.       Pertinent Medical History         Objective   {Disappearing Hyperlinks   Review Vitals * Enter New Vitals * Results Review * Labs * Imaging * Cardiology * Procedures * Lung Cancer Screening :28280}  /72 (BP Location: Left arm, Patient Position: Sitting, Cuff Size: Large)   Pulse 90   Temp 97.7 °F (36.5 °C) (Temporal)   Ht 5' 4\" (1.626 m)   Wt 99.5 kg (219 lb 5.7 oz)   LMP 12/04/2023 (Exact Date)   SpO2 99%   BMI 37.65 kg/m²     Physical Exam  Vitals and nursing note reviewed.   Constitutional:       Appearance: Normal appearance.   HENT:      Head: Normocephalic.      Right Ear: Tympanic membrane normal.      Left Ear: Tympanic membrane normal.      Nose: Nose normal. No congestion.      Mouth/Throat:      Mouth: Mucous membranes are moist.      Pharynx: Oropharynx is clear. No oropharyngeal exudate.   Eyes:      Extraocular Movements: Extraocular movements intact.      Conjunctiva/sclera: Conjunctivae normal.      Pupils: Pupils are equal, round, and reactive to light.   Cardiovascular:      Rate and Rhythm: Normal rate and regular rhythm.      Pulses: Normal pulses.      Heart sounds: Normal heart sounds. No murmur heard.  Pulmonary:      Effort: Pulmonary effort is normal. No respiratory distress.      Breath sounds: Normal breath sounds. No wheezing.   Abdominal:      General: Bowel sounds are normal.      Palpations: Abdomen is soft.      Tenderness: There is no abdominal tenderness.   Musculoskeletal:         General: No swelling, tenderness, deformity or signs of injury. Normal range of motion.      Cervical back: " Normal range of motion and neck supple. No tenderness.      Right lower leg: No edema.      Left lower leg: No edema.   Lymphadenopathy:      Cervical: No cervical adenopathy.   Skin:     General: Skin is warm and dry.      Capillary Refill: Capillary refill takes less than 2 seconds.      Findings: No rash.   Neurological:      General: No focal deficit present.      Mental Status: She is alert and oriented to person, place, and time.      Cranial Nerves: No cranial nerve deficit.      Sensory: No sensory deficit.      Motor: No weakness.      Coordination: Coordination normal.      Gait: Gait normal.      Deep Tendon Reflexes: Reflexes normal.   Psychiatric:         Mood and Affect: Mood normal.         Behavior: Behavior normal.       Administrative Statements {Disappearing Hyperlinks I  Level of Service * [facility]/\[facility] [place] [facility]\""P:[id number]}

## 2024-06-14 ENCOUNTER — APPOINTMENT (OUTPATIENT)
Dept: LAB | Facility: HOSPITAL | Age: 39
End: 2024-06-14
Payer: COMMERCIAL

## 2024-06-14 ENCOUNTER — TELEPHONE (OUTPATIENT)
Age: 39
End: 2024-06-14

## 2024-06-14 ENCOUNTER — ROUTINE PRENATAL (OUTPATIENT)
Age: 39
End: 2024-06-14

## 2024-06-14 VITALS
OXYGEN SATURATION: 100 % | BODY MASS INDEX: 37.39 KG/M2 | HEART RATE: 100 BPM | WEIGHT: 219 LBS | DIASTOLIC BLOOD PRESSURE: 66 MMHG | SYSTOLIC BLOOD PRESSURE: 112 MMHG | HEIGHT: 64 IN

## 2024-06-14 DIAGNOSIS — O09.529 ANTEPARTUM MULTIGRAVIDA OF ADVANCED MATERNAL AGE: ICD-10-CM

## 2024-06-14 DIAGNOSIS — R73.09 ABNORMAL GLUCOSE TOLERANCE TEST: Primary | ICD-10-CM

## 2024-06-14 DIAGNOSIS — O09.892 SUPERVISION OF OTHER HIGH RISK PREGNANCIES, SECOND TRIMESTER: ICD-10-CM

## 2024-06-14 DIAGNOSIS — O34.211 MATERNAL CARE DUE TO LOW TRANSVERSE UTERINE SCAR FROM PREVIOUS CESAREAN DELIVERY: ICD-10-CM

## 2024-06-14 DIAGNOSIS — R73.09 ABNORMAL GLUCOSE TOLERANCE TEST: ICD-10-CM

## 2024-06-14 DIAGNOSIS — O23.42 UTI IN PREGNANCY, ANTEPARTUM, SECOND TRIMESTER: ICD-10-CM

## 2024-06-14 DIAGNOSIS — O09.892 SUPERVISION OF OTHER HIGH RISK PREGNANCIES, SECOND TRIMESTER: Primary | ICD-10-CM

## 2024-06-14 DIAGNOSIS — O99.810 ABNORMAL MATERNAL GLUCOSE TOLERANCE, ANTEPARTUM: ICD-10-CM

## 2024-06-14 DIAGNOSIS — Z3A.15 15 WEEKS GESTATION OF PREGNANCY: ICD-10-CM

## 2024-06-14 DIAGNOSIS — Z3A.19 19 WEEKS GESTATION OF PREGNANCY: ICD-10-CM

## 2024-06-14 DIAGNOSIS — O09.819 PREGNANCY RESULTING FROM ASSISTED REPRODUCTIVE TECHNOLOGY, ANTEPARTUM: ICD-10-CM

## 2024-06-14 DIAGNOSIS — Z14.8 CARRIER OF HEREDITARY DISEASE: ICD-10-CM

## 2024-06-14 PROBLEM — O24.419 GESTATIONAL DIABETES: Status: ACTIVE | Noted: 2024-06-14

## 2024-06-14 LAB
BACTERIA UR QL AUTO: NORMAL /HPF
BILIRUB UR QL STRIP: NEGATIVE
CLARITY UR: CLEAR
COLOR UR: COLORLESS
GLUCOSE 1H P 100 G GLC PO SERPL-MCNC: 213 MG/DL (ref 65–179)
GLUCOSE 2H P 100 G GLC PO SERPL-MCNC: 169 MG/DL (ref 65–154)
GLUCOSE 3H P 100 G GLC PO SERPL-MCNC: 101 MG/DL (ref 65–139)
GLUCOSE P FAST SERPL-MCNC: 87 MG/DL (ref 65–94)
GLUCOSE UR STRIP-MCNC: NEGATIVE MG/DL
HGB UR QL STRIP.AUTO: NEGATIVE
KETONES UR STRIP-MCNC: NEGATIVE MG/DL
LEUKOCYTE ESTERASE UR QL STRIP: NEGATIVE
NITRITE UR QL STRIP: NEGATIVE
NON-SQ EPI CELLS URNS QL MICRO: NORMAL /HPF
PH UR STRIP.AUTO: 7.5 [PH]
PROT UR STRIP-MCNC: NEGATIVE MG/DL
RBC #/AREA URNS AUTO: NORMAL /HPF
SP GR UR STRIP.AUTO: 1.01 (ref 1–1.03)
UROBILINOGEN UR STRIP-ACNC: <2 MG/DL
WBC #/AREA URNS AUTO: NORMAL /HPF

## 2024-06-14 PROCEDURE — 82105 ALPHA-FETOPROTEIN SERUM: CPT

## 2024-06-14 PROCEDURE — PNV: Performed by: PHYSICIAN ASSISTANT

## 2024-06-14 PROCEDURE — 82951 GLUCOSE TOLERANCE TEST (GTT): CPT

## 2024-06-14 PROCEDURE — 82952 GTT-ADDED SAMPLES: CPT

## 2024-06-14 PROCEDURE — 81001 URINALYSIS AUTO W/SCOPE: CPT | Performed by: PHYSICIAN ASSISTANT

## 2024-06-14 PROCEDURE — 36415 COLL VENOUS BLD VENIPUNCTURE: CPT

## 2024-06-14 PROCEDURE — 87086 URINE CULTURE/COLONY COUNT: CPT | Performed by: PHYSICIAN ASSISTANT

## 2024-06-14 NOTE — TELEPHONE ENCOUNTER
Called and spoke with the lab to see if hgA1C can be added to patients already drawn blood work. Per Bridget, cannot add to the AFP. Patient needs to get it drawn.

## 2024-06-14 NOTE — PROGRESS NOTES
19w5d pregnant female, here for prenatal visit. Pt well, without complaints.     Reviewed abnormal 3 hour GTT and need to eval with diabetes team.    MsAFP: in process   Anatomy scan: scheduled 24    Current Outpatient Medications on File Prior to Visit   Medication Sig Dispense Refill    albuterol (2.5 mg/3 mL) 0.083 % nebulizer solution Take 3 mL (2.5 mg total) by nebulization every 6 (six) hours as needed for wheezing or shortness of breath 180 mL 5    albuterol (Proventil HFA) 90 mcg/act inhaler Inhale 2 puffs every 6 (six) hours as needed for wheezing 6.7 g 5    aspirin 81 mg chewable tablet Chew 81 mg daily      cholecalciferol 400 units tablet Take 400 Units by mouth daily      metoclopramide (Reglan) 10 mg tablet Take 1 tablet (10 mg total) by mouth 4 (four) times a day As needed for nausea 30 tablet 0    omeprazole (PriLOSEC) 20 mg delayed release capsule Take 1 capsule (20 mg total) by mouth daily 90 capsule 3    Prenatal MV-Min-Fe Fum-FA-DHA (PRENATAL 1 PO) Take by mouth       No current facility-administered medications on file prior to visit.       Pregravid Weight/BMI: 96.6 kg (213 lb) (BMI 36.54)  Current Weight:     Total Weight Gain: 2.722 kg (6 lb)            Vitals:    24 1401   BP: 112/66   Pulse: 100   SpO2: 100%       Fundal height: 19  Fetal Heart Rate: 148      Problem List          Respiratory    Asthma    Overview     Albuteral inhaler PRN            Digestive    Gastroesophageal reflux disease without esophagitis       Endocrine    Abnormal maternal glucose tolerance, antepartum    Overview     Elevated early glucola,   3 hour glucola ordered            Genitourinary    Maternal care due to low transverse uterine scar from previous  delivery    Overview     Awaiting records  Likely wants TOLAC  ? Reaction to meds given w/csection:  rash on face         UTI in pregnancy, antepartum, second trimester    Overview     >100,000 cfu/ml Enterococcus faecalis  : treated with  macrobid @ 15 weeks pregnant    FRANDY urine culture at 20 week visit.: urine sent for UA and C&S today.                             Behavioral Health    Moderate episode of recurrent major depressive disorder (HCC)    Anxiety    Overview     No meds X years            Obstetrics/Gynecology    Pregnancy resulting from assisted reproductive technology, antepartum    Overview     IVF pregnancy with frozen embryo, +PGT  Fetal echo_____  Growth US at 32  NSTs weekly @ 36  EMMANUEL weekly @ 36           Antepartum multigravida of advanced maternal age    19 weeks gestation of pregnancy    Obesity complicating pregnancy, childbirth, or puerperium, antepartum    Supervision of other high risk pregnancies, second trimester       Other    Obesity (BMI 30-39.9)    Overview     Pre gestational BMI:  36  Early glucola ordered: 144,  3 hour GTT ordered: abnormal             Seasonal allergies    Carrier of hereditary disease    Overview     + 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

## 2024-06-16 PROBLEM — O23.42 UTI IN PREGNANCY, ANTEPARTUM, SECOND TRIMESTER: Status: RESOLVED | Noted: 2024-05-17 | Resolved: 2024-06-16

## 2024-06-16 LAB — BACTERIA UR CULT: NORMAL

## 2024-06-17 LAB
2ND TRIMESTER 4 SCREEN SERPL-IMP: NORMAL
AFP ADJ MOM SERPL: 1.39
AFP INTERP AMN-IMP: NORMAL
AFP INTERP SERPL-IMP: NORMAL
AFP INTERP SERPL-IMP: NORMAL
AFP SERPL-MCNC: 61.8 NG/ML
AGE AT DELIVERY: 38 YR
GA METHOD: NORMAL
GA: 19.7 WEEKS
IDDM PATIENT QL: NO
MULTIPLE PREGNANCY: NO
NEURAL TUBE DEFECT RISK FETUS: 3704 %

## 2024-06-28 ENCOUNTER — ROUTINE PRENATAL (OUTPATIENT)
Dept: PERINATAL CARE | Facility: OTHER | Age: 39
End: 2024-06-28
Payer: COMMERCIAL

## 2024-06-28 VITALS
HEART RATE: 96 BPM | DIASTOLIC BLOOD PRESSURE: 66 MMHG | WEIGHT: 220.8 LBS | BODY MASS INDEX: 37.69 KG/M2 | HEIGHT: 64 IN | SYSTOLIC BLOOD PRESSURE: 116 MMHG

## 2024-06-28 DIAGNOSIS — O09.522 MULTIGRAVIDA OF ADVANCED MATERNAL AGE IN SECOND TRIMESTER: ICD-10-CM

## 2024-06-28 DIAGNOSIS — O09.812 PREGNANCY RESULTING FROM IN VITRO FERTILIZATION IN SECOND TRIMESTER: ICD-10-CM

## 2024-06-28 DIAGNOSIS — E66.01 SEVERE OBESITY DUE TO EXCESS CALORIES AFFECTING PREGNANCY, ANTEPARTUM (HCC): ICD-10-CM

## 2024-06-28 DIAGNOSIS — O34.211 MATERNAL CARE DUE TO LOW TRANSVERSE UTERINE SCAR FROM PREVIOUS CESAREAN DELIVERY: ICD-10-CM

## 2024-06-28 DIAGNOSIS — Z3A.21 21 WEEKS GESTATION OF PREGNANCY: Primary | ICD-10-CM

## 2024-06-28 DIAGNOSIS — O99.210 SEVERE OBESITY DUE TO EXCESS CALORIES AFFECTING PREGNANCY, ANTEPARTUM (HCC): ICD-10-CM

## 2024-06-28 DIAGNOSIS — O24.419 GESTATIONAL DIABETES MELLITUS (GDM) IN SECOND TRIMESTER, GESTATIONAL DIABETES METHOD OF CONTROL UNSPECIFIED: ICD-10-CM

## 2024-06-28 DIAGNOSIS — Z36.3 ENCOUNTER FOR ANTENATAL SCREENING FOR MALFORMATIONS: ICD-10-CM

## 2024-06-28 DIAGNOSIS — Z36.86 ENCOUNTER FOR ANTENATAL SCREENING FOR CERVICAL LENGTH: ICD-10-CM

## 2024-06-28 PROCEDURE — 76811 OB US DETAILED SNGL FETUS: CPT | Performed by: OBSTETRICS & GYNECOLOGY

## 2024-06-28 PROCEDURE — 76817 TRANSVAGINAL US OBSTETRIC: CPT | Performed by: OBSTETRICS & GYNECOLOGY

## 2024-06-28 PROCEDURE — 99214 OFFICE O/P EST MOD 30 MIN: CPT | Performed by: OBSTETRICS & GYNECOLOGY

## 2024-06-28 NOTE — PROGRESS NOTES
Ultrasound Probe Disinfection    A transvaginal ultrasound was performed.   Prior to use, disinfection was performed with High Level Disinfection Process ([employer]eter[employer]).  Probe serial number [id number] was used.      June Brady  06/28/24  2:17 PM

## 2024-06-28 NOTE — PROGRESS NOTES
"[facility]: Darlin Mckinney was seen today for anatomic survey and cervical length screening ultrasound.  See ultrasound report under \"OB Procedures\" tab.   The time spent on this established patient on the encounter date included 5 minutes previsit service time reviewing records and precharting, 10 minutes face-to-face service time counseling regarding results and coordinating care, and  10 minutes charting, totalling 25 minutes.  Please don't hesitate to contact our office with any concerns or questions.  -Qi Ferreira MD      "

## 2024-07-05 ENCOUNTER — ROUTINE PRENATAL (OUTPATIENT)
Age: 39
End: 2024-07-05

## 2024-07-05 VITALS
BODY MASS INDEX: 37.36 KG/M2 | WEIGHT: 218.8 LBS | HEART RATE: 77 BPM | OXYGEN SATURATION: 98 % | HEIGHT: 64 IN | SYSTOLIC BLOOD PRESSURE: 122 MMHG | DIASTOLIC BLOOD PRESSURE: 68 MMHG

## 2024-07-05 DIAGNOSIS — O09.529 ANTEPARTUM MULTIGRAVIDA OF ADVANCED MATERNAL AGE: ICD-10-CM

## 2024-07-05 DIAGNOSIS — O24.419 GESTATIONAL DIABETES MELLITUS (GDM) IN SECOND TRIMESTER, GESTATIONAL DIABETES METHOD OF CONTROL UNSPECIFIED: ICD-10-CM

## 2024-07-05 DIAGNOSIS — Z3A.22 22 WEEKS GESTATION OF PREGNANCY: ICD-10-CM

## 2024-07-05 DIAGNOSIS — O34.211 MATERNAL CARE DUE TO LOW TRANSVERSE UTERINE SCAR FROM PREVIOUS CESAREAN DELIVERY: ICD-10-CM

## 2024-07-05 DIAGNOSIS — O09.819 PREGNANCY RESULTING FROM ASSISTED REPRODUCTIVE TECHNOLOGY, ANTEPARTUM: ICD-10-CM

## 2024-07-05 DIAGNOSIS — O99.810 ABNORMAL MATERNAL GLUCOSE TOLERANCE, ANTEPARTUM: ICD-10-CM

## 2024-07-05 DIAGNOSIS — O09.892 SUPERVISION OF OTHER HIGH RISK PREGNANCIES, SECOND TRIMESTER: Primary | ICD-10-CM

## 2024-07-05 DIAGNOSIS — Z14.8 CARRIER OF HEREDITARY DISEASE: ICD-10-CM

## 2024-07-05 PROCEDURE — PNV: Performed by: PHYSICIAN ASSISTANT

## 2024-07-05 NOTE — PROGRESS NOTES
22w5d pregnant female, here for prenatal visit. Pt well, without complaints. Did have episode after sitting on stool where she had pulsing sensation in her buttock area that lasted a few seconds and resolved.  No bleeding. Will monitor.    MsAFP: negative  Anatomy scan: completed    Current Outpatient Medications on File Prior to Visit   Medication Sig Dispense Refill    albuterol (2.5 mg/3 mL) 0.083 % nebulizer solution Take 3 mL (2.5 mg total) by nebulization every 6 (six) hours as needed for wheezing or shortness of breath 180 mL 5    albuterol (Proventil HFA) 90 mcg/act inhaler Inhale 2 puffs every 6 (six) hours as needed for wheezing 6.7 g 5    aspirin 81 mg chewable tablet Chew 162 mg daily      cholecalciferol 400 units tablet Take 400 Units by mouth daily      metoclopramide (Reglan) 10 mg tablet Take 1 tablet (10 mg total) by mouth 4 (four) times a day As needed for nausea (Patient not taking: Reported on 2024) 30 tablet 0    omeprazole (PriLOSEC) 20 mg delayed release capsule Take 1 capsule (20 mg total) by mouth daily 90 capsule 3    Prenatal MV-Min-Fe Fum-FA-DHA (PRENATAL 1 PO) Take by mouth       No current facility-administered medications on file prior to visit.       Pregravid Weight/BMI: 96.6 kg (213 lb) (BMI 36.54)  Current Weight:     Total Weight Gain: 3.538 kg (7 lb 12.8 oz)            Vitals:    24 0912   BP: 122/68   Pulse: 77   SpO2: 98%       Fundal height: 22  Fetal Heart Rate: 144      Problem List          Respiratory    Asthma    Overview     Albuteral inhaler PRN            Digestive    Gastroesophageal reflux disease without esophagitis       Endocrine    Abnormal maternal glucose tolerance, antepartum    Overview     Elevated early glucola,   3 hour glucola ordered         Gestational diabetes       Genitourinary    Maternal care due to low transverse uterine scar from previous  delivery    Overview     Awaiting records  Likely wants TOLAC  ? Reaction to meds given  w/csection:  rash on face            Behavioral Health    Moderate episode of recurrent major depressive disorder (HCC)    Anxiety    Overview     No meds X years            Obstetrics/Gynecology    Pregnancy resulting from assisted reproductive technology, antepartum    Overview     IVF pregnancy with frozen embryo, +PGT  Fetal echo_____  Growth US at 32  NSTs weekly @ 36  EMMANUEL weekly @ 36           Antepartum multigravida of advanced maternal age    22 weeks gestation of pregnancy    Obesity complicating pregnancy, childbirth, or puerperium, antepartum    Supervision of other high risk pregnancies, second trimester       Other    Obesity (BMI 30-39.9)    Overview     Pre gestational BMI:  36  Early glucola ordered             Seasonal allergies    Carrier of hereditary disease    Overview     + carrier:  congenital myotonia and congenital adrenal hyperplasia  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

## 2024-07-09 ENCOUNTER — TELEMEDICINE (OUTPATIENT)
Facility: HOSPITAL | Age: 39
End: 2024-07-09
Payer: COMMERCIAL

## 2024-07-09 ENCOUNTER — ROUTINE PRENATAL (OUTPATIENT)
Dept: PERINATAL CARE | Facility: OTHER | Age: 39
End: 2024-07-09
Payer: COMMERCIAL

## 2024-07-09 VITALS
SYSTOLIC BLOOD PRESSURE: 108 MMHG | DIASTOLIC BLOOD PRESSURE: 74 MMHG | HEIGHT: 64 IN | WEIGHT: 222 LBS | BODY MASS INDEX: 37.9 KG/M2 | HEART RATE: 88 BPM

## 2024-07-09 VITALS — HEIGHT: 64 IN | WEIGHT: 218 LBS | BODY MASS INDEX: 37.22 KG/M2

## 2024-07-09 DIAGNOSIS — O24.419 GESTATIONAL DIABETES MELLITUS (GDM) IN SECOND TRIMESTER, GESTATIONAL DIABETES METHOD OF CONTROL UNSPECIFIED: Primary | ICD-10-CM

## 2024-07-09 DIAGNOSIS — Z3A.23 23 WEEKS GESTATION OF PREGNANCY: ICD-10-CM

## 2024-07-09 DIAGNOSIS — O09.819 PREGNANCY RESULTING FROM ASSISTED REPRODUCTIVE TECHNOLOGY, ANTEPARTUM: Primary | ICD-10-CM

## 2024-07-09 DIAGNOSIS — O99.210 SEVERE OBESITY DUE TO EXCESS CALORIES AFFECTING PREGNANCY, ANTEPARTUM (HCC): ICD-10-CM

## 2024-07-09 DIAGNOSIS — O09.529 ANTEPARTUM MULTIGRAVIDA OF ADVANCED MATERNAL AGE: ICD-10-CM

## 2024-07-09 DIAGNOSIS — E66.01 SEVERE OBESITY DUE TO EXCESS CALORIES AFFECTING PREGNANCY, ANTEPARTUM (HCC): ICD-10-CM

## 2024-07-09 DIAGNOSIS — E66.9 OBESITY (BMI 30-39.9): ICD-10-CM

## 2024-07-09 PROCEDURE — 76825 ECHO EXAM OF FETAL HEART: CPT | Performed by: OBSTETRICS & GYNECOLOGY

## 2024-07-09 PROCEDURE — 76827 ECHO EXAM OF FETAL HEART: CPT | Performed by: OBSTETRICS & GYNECOLOGY

## 2024-07-09 PROCEDURE — 93325 DOPPLER ECHO COLOR FLOW MAPG: CPT | Performed by: OBSTETRICS & GYNECOLOGY

## 2024-07-09 PROCEDURE — 99215 OFFICE O/P EST HI 40 MIN: CPT | Performed by: NURSE PRACTITIONER

## 2024-07-09 RX ORDER — BLOOD SUGAR DIAGNOSTIC
STRIP MISCELLANEOUS
Qty: 100 EACH | Refills: 3 | Status: SHIPPED | OUTPATIENT
Start: 2024-07-09

## 2024-07-09 RX ORDER — LANCETS 33 GAUGE
EACH MISCELLANEOUS
Qty: 100 EACH | Refills: 3 | Status: SHIPPED | OUTPATIENT
Start: 2024-07-09

## 2024-07-09 RX ORDER — BLOOD-GLUCOSE METER
KIT MISCELLANEOUS
Qty: 1 KIT | Refills: 0 | Status: SHIPPED | OUTPATIENT
Start: 2024-07-09

## 2024-07-09 RX ORDER — FLUTICASONE PROPIONATE 110 UG/1
2 AEROSOL, METERED RESPIRATORY (INHALATION) 2 TIMES DAILY
COMMUNITY

## 2024-07-09 NOTE — PROGRESS NOTES
The patient was seen today for an ultrasound.  Please see ultrasound report (located under Ob Procedures) for additional details.   Thank you very much for allowing us to participate in the care of this very nice patient.  Should you have any questions, please do not hesitate to contact me.     Remberto Pryor MD FACOG  Attending Physician, Maternal-Fetal Medicine  [facility]

## 2024-07-09 NOTE — PROGRESS NOTES
Virtual Regular Visit  Name: Darlin Mckinney      : 1985      MRN: [id number]  Encounter Provider: EDGARDO Bowie  Encounter Date: 2024   Encounter department: [facility]    Verification of patient location:    Patient is located at Home in the following state in which I hold an active license [place]    Assessment & Plan   1. Gestational diabetes mellitus (GDM) in second trimester, gestational diabetes method of control unspecified  Assessment & Plan:  -Check A1c and CMP.  -A1c goal is 5.6% or less.  -Follow up with dietitian.  -Keep 3 day food log to review with dietitian.  -Start self monitoring blood glucose (SMBG) fasting; 2 hours after start of each meal and with hypoglycemia.   -Glucose goals: fasting 60-95 mg/dL, 140 mg/dL or less 1 hour post meals, and 120 mg/dL or less 2 hours post meal.   -Report glucose readings weekly via Signifydt every Tuesday.   -Start GDM meal plan with 3 meals and 3 snacks including recommended combination of carb, protein and fat per meal/snack.  -Please eat meal or snack every 2-3.5 hours while awake.  -No more than 8 to 10 hours of fasting overnight.  -Refer to [employer] MyPlate online as a reference.  -2nd/3rd trimester minimum total daily carbohydrates 175 grams paired with half grams in protein.   -Stay active if no restriction from your OB, walk up to 30 minutes a day.  -Always have glucose available to treat hypoglycemia. Use 15:15 rule.   -Refer to hypoglycemia patient education sheet. SMBG when experiencing signs and symptoms of hypoglycemia and prior to driving.   -Serial fetal growth ultrasounds every 4 to 6 weeks.   -20 weeks detailed fetal growth ultrasound completed.   -If diabetes related medications are started, at 32 weeks gestation; NST twice a week and EMMANUEL weekly.   -Continue prenatal vitamin and baby aspirin as recommended.  -At 36 weeks gestation, stop baby aspirin.   -Continue follow-up with your OB and MFM as  "recommended.  -Stay in close contact with diabetes education team.  -Insulin requirements during pregnancy; basal/bolus concept and Metformin discussed.  -Very important to maintain tight glucose control during pregnancy to decrease risk factors including fetal macrosomia; birth injury; risk of ; polyhydramnios; pre-term labor; pre-eclampsia;  hypoglycemia; jaundice and stillbirth.   -Diabetes and pregnancy booklet; meal plan and hypoglycemia patient education.   -4 to 12 weeks postpartum, complete 2-hour glucose tolerance test for diabetes screening.  -Postpartum continue with healthy lifestyle to decrease risk of developing type 2 diabetes.       No results found for: \"HGBA1C\"  Orders:  -     Ambulatory Referral to Maternal Fetal Medicine  -     OneTouch Delica Lancets 33G MISC; Use 4 a day or as directed. (Patient not taking: Reported on 2024)  -     OneTouch Verio test strip; Test 4 times a day and as instructed. Gestational diabetes. (Patient not taking: Reported on 2024)  -     Blood Glucose Monitoring Suppl (OneTouch Verio Reflect) w/Device KIT; Dispense 1 kit per insurance formulary. GDM (Patient not taking: Reported on 2024)  -     Mychart glucose flowsheet  -     Hemoglobin A1C  -     Comprehensive metabolic panel  2. 23 weeks gestation of pregnancy  -     OneTouch Delica Lancets 33G MISC; Use 4 a day or as directed. (Patient not taking: Reported on 2024)  -     OneTouch Verio test strip; Test 4 times a day and as instructed. Gestational diabetes. (Patient not taking: Reported on 2024)  -     Blood Glucose Monitoring Suppl (OneTouch Verio Reflect) w/Device KIT; Dispense 1 kit per insurance formulary. GDM (Patient not taking: Reported on 2024)  -     Mychart glucose flowsheet  -     Hemoglobin A1C  -     Comprehensive metabolic panel  3. Severe obesity due to excess calories affecting pregnancy, antepartum (HCC)  Assessment & Plan:  -Pre-pregnancy weight 213 lbs. BMI " 36.54.  -Current weight 218 lbs. BMI 37.56.  -Recommended weight gain 11 to 20 lbs.  -up to date weight gain 5 lbs.  -Start GDM meal plan and follow up with dietitian.   Orders:  -     OneTouch Delica Lancets 33G MISC; Use 4 a day or as directed. (Patient not taking: Reported on 7/9/2024)  -     OneTouch Verio test strip; Test 4 times a day and as instructed. Gestational diabetes. (Patient not taking: Reported on 7/9/2024)  -     Blood Glucose Monitoring Suppl (OneTouch Verio Reflect) w/Device KIT; Dispense 1 kit per insurance formulary. GDM (Patient not taking: Reported on 7/9/2024)  -     Mychart glucose flowsheet  -     Hemoglobin A1C  -     Comprehensive metabolic panel  4. Obesity (BMI 30-39.9)  -     OneTouch Delica Lancets 33G MISC; Use 4 a day or as directed. (Patient not taking: Reported on 7/9/2024)  -     OneTouch Verio test strip; Test 4 times a day and as instructed. Gestational diabetes. (Patient not taking: Reported on 7/9/2024)  -     Blood Glucose Monitoring Suppl (OneTouch Verio Reflect) w/Device KIT; Dispense 1 kit per insurance formulary. GDM (Patient not taking: Reported on 7/9/2024)  -     Mychart glucose flowsheet  -     Hemoglobin A1C  -     Comprehensive metabolic panel  5. Antepartum multigravida of advanced maternal age  -     OneTouch Delica Lancets 33G MISC; Use 4 a day or as directed. (Patient not taking: Reported on 7/9/2024)  -     OneTouch Verio test strip; Test 4 times a day and as instructed. Gestational diabetes. (Patient not taking: Reported on 7/9/2024)  -     Blood Glucose Monitoring Suppl (OneTouch Verio Reflect) w/Device KIT; Dispense 1 kit per insurance formulary. GDM (Patient not taking: Reported on 7/9/2024)  -     Mychart glucose flowsheet  -     Hemoglobin A1C  -     Comprehensive metabolic panel        Encounter provider EDGARDO Bowie    The patient was identified by name and date of birth. Darlin Ferreres was informed that this is a telemedicine visit and that the visit  is being conducted through the Epic Embedded platform. She agrees to proceed..  My office door was closed. No one else was in the room.  She acknowledged consent and understanding of privacy and security of the video platform. The patient has agreed to participate and understands they can discontinue the visit at any time.    Patient is aware this is a billable service.     History of Present Illness     Darlin Mckinney is a 38 y.o. female who presents  23 2/7 weeks gestation GDM. No history of GDM. IVF. Positive fetal movement. Has 11 yo son.   During the week up at 5 AM; work by 6:30 AM and will eat grape nuts with raisins cereal/milk; might have a snack; then lunch 11:30 AM to 1 PM leftovers; might have a snack then dinner at 4:30 to 6 PM a balanced meal; might have a snack at 7:30 to 8 PM then in bed by 8 to 8:30 PM.   Stretching exercise at 8:30 AM for 10 minutes at work.   Weekends up at 8 AM, will eat breakfast around 8:30 to 9 AM; lunch 11 to 12 PM; dinner 4:30 to 6 PM. More active on the weekend and has a garden.   Review of Systems   Constitutional:  Positive for fatigue. Negative for fever.   HENT:  Positive for postnasal drip.    Eyes:  Positive for visual disturbance.   Respiratory:  Positive for cough and shortness of breath (h/o asthma).    Cardiovascular:  Negative for chest pain, palpitations and leg swelling.   Gastrointestinal:  Negative for constipation, nausea and vomiting.        Heartburn- on prilosec   Endocrine: Positive for polydipsia and polyuria. Negative for polyphagia.   Genitourinary:  Negative for difficulty urinating and vaginal bleeding.   Musculoskeletal:  Positive for arthralgias (intermittently).   Skin:  Negative for rash.   Neurological:  Positive for headaches.   Psychiatric/Behavioral:  Positive for sleep disturbance.      Medical History Reviewed by provider this encounter:  Tobacco  Allergies  Meds  Problems  Med Hx  Surg Hx  Fam Hx  Soc   Hx      Current Outpatient  "Medications on File Prior to Visit   Medication Sig Dispense Refill    albuterol (Proventil HFA) 90 mcg/act inhaler Inhale 2 puffs every 6 (six) hours as needed for wheezing 6.7 g 5    aspirin 81 mg chewable tablet Chew 162 mg daily      cholecalciferol 400 units tablet Take 400 Units by mouth daily      omeprazole (PriLOSEC) 20 mg delayed release capsule Take 1 capsule (20 mg total) by mouth daily 90 capsule 3    Prenatal MV-Min-Fe Fum-FA-DHA (PRENATAL 1 PO) Take by mouth      albuterol (2.5 mg/3 mL) 0.083 % nebulizer solution Take 3 mL (2.5 mg total) by nebulization every 6 (six) hours as needed for wheezing or shortness of breath 180 mL 5    fluticasone (FLOVENT HFA) 110 MCG/ACT inhaler Inhale 2 puffs 2 (two) times a day Rinse mouth after use. (Patient not taking: Reported on 2024)      metoclopramide (Reglan) 10 mg tablet Take 1 tablet (10 mg total) by mouth 4 (four) times a day As needed for nausea (Patient not taking: Reported on 2024) 30 tablet 0     No current facility-administered medications on file prior to visit.      Social History     Tobacco Use    Smoking status: Former     Current packs/day: 0.00     Types: Cigarettes     Start date: 2008     Quit date: 2018     Years since quittin.5    Smokeless tobacco: Never   Vaping Use    Vaping status: Former   Substance and Sexual Activity    Alcohol use: Not Currently     Alcohol/week: 5.0 standard drinks of alcohol     Types: 5 Cans of beer per week     Comment: Social    Drug use: Never    Sexual activity: Yes     Partners: Male     Objective     Ht 5' 4\" (1.626 m)   Wt 98.9 kg (218 lb)   LMP 2023 (Exact Date)   BMI 37.42 kg/m²   Physical Exam  HENT:      Head: Normocephalic.      Nose: Nose normal.   Eyes:      Conjunctiva/sclera: Conjunctivae normal.   Pulmonary:      Effort: Pulmonary effort is normal.   Neurological:      Mental Status: She is alert and oriented to person, place, and time.   Psychiatric:         Thought " Content: Thought content normal.         Judgment: Judgment normal.         Visit Time  Total Visit Duration: 87 minutes with patient, 10 minutes charting/reviewing chart.

## 2024-07-09 NOTE — ASSESSMENT & PLAN NOTE
-Pre-pregnancy weight 213 lbs. BMI 36.54.  -Current weight 218 lbs. BMI 37.56.  -Recommended weight gain 11 to 20 lbs.  -up to date weight gain 5 lbs.  -Start GDM meal plan and follow up with dietitian.

## 2024-07-09 NOTE — ASSESSMENT & PLAN NOTE
-Check A1c and CMP.  -A1c goal is 5.6% or less.  -Follow up with dietitian.  -Keep 3 day food log to review with dietitian.  -Start self monitoring blood glucose (SMBG) fasting; 2 hours after start of each meal and with hypoglycemia.   -Glucose goals: fasting 60-95 mg/dL, 140 mg/dL or less 1 hour post meals, and 120 mg/dL or less 2 hours post meal.   -Report glucose readings weekly via VeriFonet every Tuesday.   -Start GDM meal plan with 3 meals and 3 snacks including recommended combination of carb, protein and fat per meal/snack.  -Please eat meal or snack every 2-3.5 hours while awake.  -No more than 8 to 10 hours of fasting overnight.  -Refer to [employer] MyPlate online as a reference.  -2nd/3rd trimester minimum total daily carbohydrates 175 grams paired with half grams in protein.   -Stay active if no restriction from your OB, walk up to 30 minutes a day.  -Always have glucose available to treat hypoglycemia. Use 15:15 rule.   -Refer to hypoglycemia patient education sheet. SMBG when experiencing signs and symptoms of hypoglycemia and prior to driving.   -Serial fetal growth ultrasounds every 4 to 6 weeks.   -20 weeks detailed fetal growth ultrasound completed.   -If diabetes related medications are started, at 32 weeks gestation; NST twice a week and EMMANUEL weekly.   -Continue prenatal vitamin and baby aspirin as recommended.  -At 36 weeks gestation, stop baby aspirin.   -Continue follow-up with your OB and MFM as recommended.  -Stay in close contact with diabetes education team.  -Insulin requirements during pregnancy; basal/bolus concept and Metformin discussed.  -Very important to maintain tight glucose control during pregnancy to decrease risk factors including fetal macrosomia; birth injury; risk of ; polyhydramnios; pre-term labor; pre-eclampsia;  hypoglycemia; jaundice and stillbirth.   -Diabetes and pregnancy booklet; meal plan and hypoglycemia patient education.   -4 to 12 weeks  "postpartum, complete 2-hour glucose tolerance test for diabetes screening.  -Postpartum continue with healthy lifestyle to decrease risk of developing type 2 diabetes.       No results found for: \"HGBA1C\"  "

## 2024-07-09 NOTE — PATIENT INSTRUCTIONS
-Check A1c and CMP.  -A1c goal is 5.6% or less.  -Follow up with dietitian.  -Keep 3 day food log to review with dietitian.  -Start self monitoring blood glucose (SMBG) fasting; 2 hours after start of each meal and with hypoglycemia.   -Glucose goals: fasting 60-95 mg/dL, 140 mg/dL or less 1 hour post meals, and 120 mg/dL or less 2 hours post meal.   -Report glucose readings weekly via Paper.lit every Tuesday.   -Start GDM meal plan with 3 meals and 3 snacks including recommended combination of carb, protein and fat per meal/snack.  -Please eat meal or snack every 2-3.5 hours while awake.  -No more than 8 to 10 hours of fasting overnight.  -Refer to [employer] MyPlate online as a reference.  -2nd/3rd trimester minimum total daily carbohydrates 175 grams paired with half grams in protein.   -Stay active if no restriction from your OB, walk up to 30 minutes a day.  -Always have glucose available to treat hypoglycemia. Use 15:15 rule.   -Refer to hypoglycemia patient education sheet. SMBG when experiencing signs and symptoms of hypoglycemia and prior to driving.   -Serial fetal growth ultrasounds every 4 to 6 weeks.   -20 weeks detailed fetal growth ultrasound completed.   -If diabetes related medications are started, at 32 weeks gestation; NST twice a week and EMMANUEL weekly.   -Continue prenatal vitamin and baby aspirin as recommended.  -At 36 weeks gestation, stop baby aspirin.   -Continue follow-up with your OB and MFM as recommended.  -Stay in close contact with diabetes education team.  -Insulin requirements during pregnancy; basal/bolus concept and Metformin discussed.  -Very important to maintain tight glucose control during pregnancy to decrease risk factors including fetal macrosomia; birth injury; risk of ; polyhydramnios; pre-term labor; pre-eclampsia;  hypoglycemia; jaundice and stillbirth.   -Diabetes and pregnancy booklet; meal plan and hypoglycemia patient education.   -4 to 12 weeks  postpartum, complete 2-hour glucose tolerance test for diabetes screening.  -Postpartum continue with healthy lifestyle to decrease risk of developing type 2 diabetes.

## 2024-07-18 ENCOUNTER — TELEMEDICINE (OUTPATIENT)
Facility: HOSPITAL | Age: 39
End: 2024-07-18
Payer: COMMERCIAL

## 2024-07-18 DIAGNOSIS — O24.410 DIET CONTROLLED GESTATIONAL DIABETES MELLITUS (GDM) IN SECOND TRIMESTER: Primary | ICD-10-CM

## 2024-07-18 DIAGNOSIS — Z3A.24 24 WEEKS GESTATION OF PREGNANCY: ICD-10-CM

## 2024-07-18 PROCEDURE — G0108 DIAB MANAGE TRN  PER INDIV: HCPCS | Performed by: DIETITIAN, REGISTERED

## 2024-07-18 NOTE — PROGRESS NOTES
"CLASS 2 - Individual  (virtual visit)    Thank you for referring your patient to [facility] Maternal Fetal Medicine Diabetes and Pregnancy Program.     Darlin Mckinney is a  38 y.o. female who presents today unaccompanied for Virtual Regular Visit, Patient Education (Class 2), and Gestational Diabetes (24w4d).  Patient is at 24w4d gestation, Estimated Date of Delivery: 11/3/24.     Visit Diagnosis:  Encounter Diagnosis     ICD-10-CM    1. Diet controlled gestational diabetes mellitus (GDM) in second trimester  O24.410       2. 24 weeks gestation of pregnancy  Z3A.24            Labs  GDM LABS: See Class 1 Note    A1C:  No results found for: \"HGBA1C\"     Labs (awaiting collection): A1C and CMP - reminded patient to obtain blood work    Current Medications:    Current Outpatient Medications:     albuterol (2.5 mg/3 mL) 0.083 % nebulizer solution, Take 3 mL (2.5 mg total) by nebulization every 6 (six) hours as needed for wheezing or shortness of breath, Disp: 180 mL, Rfl: 5    albuterol (Proventil HFA) 90 mcg/act inhaler, Inhale 2 puffs every 6 (six) hours as needed for wheezing, Disp: 6.7 g, Rfl: 5    aspirin 81 mg chewable tablet, Chew 162 mg daily, Disp: , Rfl:     Blood Glucose Monitoring Suppl (OneTouch Verio Reflect) w/Device KIT, Dispense 1 kit per insurance formulary. GDM (Patient not taking: Reported on 7/9/2024), Disp: 1 kit, Rfl: 0    cholecalciferol 400 units tablet, Take 400 Units by mouth daily, Disp: , Rfl:     fluticasone (FLOVENT HFA) 110 MCG/ACT inhaler, Inhale 2 puffs 2 (two) times a day Rinse mouth after use. (Patient not taking: Reported on 7/9/2024), Disp: , Rfl:     metoclopramide (Reglan) 10 mg tablet, Take 1 tablet (10 mg total) by mouth 4 (four) times a day As needed for nausea (Patient not taking: Reported on 6/28/2024), Disp: 30 tablet, Rfl: 0    omeprazole (PriLOSEC) 20 mg delayed release capsule, Take 1 capsule (20 mg total) by mouth daily, Disp: 90 capsule, Rfl: 3    OneTouch Delica Lancets 33G " "MISC, Use 4 a day or as directed. (Patient not taking: Reported on 2024), Disp: 100 each, Rfl: 3    OneTouch Verio test strip, Test 4 times a day and as instructed. Gestational diabetes. (Patient not taking: Reported on 2024), Disp: 100 each, Rfl: 3    Prenatal MV-Min-Fe Fum-FA-DHA (PRENATAL 1 PO), Take by mouth, Disp: , Rfl:      Anthropometrics:  Ht Readings from Last 1 Encounters:   24 5' 4\" (1.626 m)      Wt Readings from Last 3 Encounters:   24 101 kg (222 lb)   24 98.9 kg (218 lb)   24 99.2 kg (218 lb 12.8 oz)        Pre-Gravid Wt Pre-Gravid BMI TWG   96.6 kg (213 lb) 36.54 4.082 kg (9 lb)     Total Pregnancy Weight Gain Recommendations: BMI (> 30) 11-20 lbs  Current Wt Status Compared to Recommendations: Within Range -- Continue recommended rate of weight gain based on pre-pregnancy BMI till delivery    Most Recent Ultrasound Results:  Findings: NML Growth/EMMANUEL - 24; Fetal echo completed 24  Further Fetal Surveillance: Yes, Weekly NST/EMMANUEL starting at 36wk GA d/t Obesity  and IVF  Next US date: Scheduled Appropriately - 24    BLOOD GLUCOSE MONITORING:   Glucometer: OneTouch Verio Flex     Reinforced at Today's Visit:   Timing/Frequency of SMB x per day (Fasting, 2 hour after start of each meal)  Goals: (Fasting) 60-95mg/dL // (2hr PP) <120mg/dL  Reporting Guidelines: Weekly via Phone: [phone] OR My Chart (Message with image attachment) OR Glucose Flowsheet  Method of Reporting: [employer] Glucose Flowsheet    BG LOG: Typically goes to bed at 8:30pm        Review of Blood Glucose Log:   Indicates good glycemic control  FBG = Well controlled;Overall well controlled (Noted elevation of 106 - Patient reports no noticeable difference in activity or diet prior)  Post-Prandial BG =  Overall well controlled - Patient reports elevation to 194 related to breakfast of Grape Nuts    MEAL PLAN (Patient was provided with a meal plan including 3 meals and 3 snacks at class " 1) - Struggles to eat bedtime snack as she goes to bed at 8:30pm (dinner: 6:30pm)  *Calories: 1800 calorie (CHO: 45-15-45-15-45-30) (PRO:2-1-3-1-3-2)/2000 calorie Meal plan    Review of Patient's Current Diet: refer to class 1 note for additional details    Breakfast: (6:30AM) English Muffin + Sausage + 1 Egg White + 1 Slice Cheese  AM Snack: Yogurt (Yoplait High Protein) + Fruit  Lunch: 1 cup Lasagna (Ricotta Cheese, Meat) or [place] (Ham and Cheese + 2 slices Bread/Mustard) or Chipotle (Rice, Beef, Lettuce, Sour Cream)  Dinner: (6:30pm) Steak, Green Beans, Baked Potato  Bedtime Snack(8-8:30pm): 1 oz Mixed Nuts (Quest Cookie, Peanut Butter Cup)    Beverages: No Sugar Sweetened Beverages Drinks at least 74 ounces of water daily; 6-8 oz coffee  Dining Out Frequency: Rarely - Chipotle (Pizza)    Meal Plan Recommendations Compliant? Comments:    Consistent CHO Intake Yes  Discussed target minimum of 175 grams carbohydrate daily   3 Meals and 3 Snacks Yes     Protein w/ Every Meal and Snack Yes     Eating every 2-3.5hrs while awake  Yes     8-10hrs Fasting (from time of bedtime snack until first meal of the day) Yes          Overall Impression: Pt has a excellent compliance and understanding of diet recommendations at this time.    Reinforced Diet Instructions:  Individualized meal plan.   Importance of consistent carbohydrate intake via 3 meals and 3 snacks per day   Importance of protein as it relates to blood glucose control.   Encouraged  patient to eat every 2.0-3.5 hours while awake  Encouraged patient to go no longer than 8-10 hours fasting overnight until first meal of the day.  Provided suggested meal/snack options to increase nutrition and maintain consistent meal and snack intakes.    Physical Activity:  Currently physically active? Yes, Stretching daily - Walking after dinner (30 minutes)    Reviewed w/ Pt:   Benefits of physical activity to optimize blood glucose control, encouraged activity at patient is  "physically able.   Instructed pt to always consult a physician prior to starting an exercise program.   Recommend 20-30 minutes daily.    Additional Topics Reviewed:    Medications: (reviewed options available with pt)  Discussed if blood sugars are not within normal range with meal planning and exercise  Reviewed medication such as metformin and/or basal/bolus insulin may be needed for better glucose control  Maternal-Fetal Testing:   Ultrasounds: growth scans every 4 weeks.  NST: twice weekly starting at 32nd week GA  EMMANUEL:  weekly starting at 32 weeks GA  Sick day Guidelines:   Advised that sickness will raise blood sugar   If blood sugar is > 160 mg/dL twice in one day call doctor  If on diabetes medications, continue as instructed   If unable to consume normal meal plan, instructed to remain well hydrated   Hypoglycemia & Treatment Guidelines:  Reviewed what hypoglycemia is, signs and symptoms, and how to treat via the 15:15 rule.  Post-Partum Guidelines:  Completion of 75 gm CHO 2 hr gtt at 6 weeks post-partum to check for Type 2 DM diagnosis  Breastfeeding Guidelines:  Continue GDM meal plan plus additional 350-500 calories daily  Examples of protein and carbohydrate snacks provided.  Stay hydrated by drinking 8-10 (8 oz.) fluids daily.  Dining Out & Travel Guidelines:  Patient advised to be prepared with extra diabetes supplies, medications, and snacks, as well as sticking to the same time schedule and portions eaten at home for meals and snacks.    Patient Stated Goal: \"I will eat 3 meals and 3 snacks each day, including protein at each\"  Goal Assessment: On track    Diabetes Self Management Support Plan outside of ongoing care: Spouse/Family    Barriers to Learning/Change: No Barriers  Expected Compliance: very good    Date to report blood sugars: Weekly   Follow up:  Return in about 8 weeks (around 9/10/2024) for Visit w/ EDGARDO Bowie.     Begin Time: 1:04pm  End Time: 2:05pm    It was a pleasure " working with them today. Please feel free to call [phone]) with any questions or concerns.    Trish Bello   Diabetes Educator  [facility] Maternal Fetal Medicine  Diabetes and Pregnancy Program  Virtual Regular Visit  Name: Darlin Mckinney      : 1985      MRN: [id number]  Encounter Provider: Trish Bello  Encounter Date: 2024   Encounter department: [facility]    Verification of patient location:    Patient is located at Home in the following state in which I hold an active license [place]    Assessment & Plan   1. Diet controlled gestational diabetes mellitus (GDM) in second trimester  2. 24 weeks gestation of pregnancy        Encounter provider Trish Bello    The patient was identified by name and date of birth. Darlin Mckinney was informed that this is a telemedicine visit and that the visit is being conducted through the Epic Embedded platform. She agrees to proceed..  My office door was closed. No one else was in the room.  She acknowledged consent and understanding of privacy and security of the video platform. The patient has agreed to participate and understands they can discontinue the visit at any time.    Patient is aware this is a billable service.     History of Present Illness     Darlin Mckinney is a 38 y.o. female who presents pregnant    Review of Systems    Objective     LMP 2023 (Exact Date)   Physical Exam    Visit Time  Total Visit Duration: 61 minutes

## 2024-07-29 ENCOUNTER — TELEPHONE (OUTPATIENT)
Age: 39
End: 2024-07-29

## 2024-07-29 ENCOUNTER — TELEPHONE (OUTPATIENT)
Dept: LAB | Facility: HOSPITAL | Age: 39
End: 2024-07-29

## 2024-07-29 ENCOUNTER — APPOINTMENT (OUTPATIENT)
Dept: LAB | Facility: CLINIC | Age: 39
End: 2024-07-29
Payer: COMMERCIAL

## 2024-07-29 ENCOUNTER — TELEPHONE (OUTPATIENT)
Dept: FAMILY MEDICINE CLINIC | Facility: CLINIC | Age: 39
End: 2024-07-29

## 2024-07-29 DIAGNOSIS — Z3A.22 22 WEEKS GESTATION OF PREGNANCY: ICD-10-CM

## 2024-07-29 DIAGNOSIS — O09.892 SUPERVISION OF OTHER HIGH RISK PREGNANCIES, SECOND TRIMESTER: ICD-10-CM

## 2024-07-29 LAB
ALBUMIN SERPL BCG-MCNC: 3.5 G/DL (ref 3.5–5)
ALP SERPL-CCNC: 86 U/L (ref 34–104)
ALT SERPL W P-5'-P-CCNC: 14 U/L (ref 7–52)
ANION GAP SERPL CALCULATED.3IONS-SCNC: 9 MMOL/L (ref 4–13)
AST SERPL W P-5'-P-CCNC: 14 U/L (ref 13–39)
BASOPHILS # BLD AUTO: 0.02 THOUSANDS/ÂΜL (ref 0–0.1)
BASOPHILS NFR BLD AUTO: 0 % (ref 0–1)
BILIRUB SERPL-MCNC: 0.45 MG/DL (ref 0.2–1)
BUN SERPL-MCNC: 13 MG/DL (ref 5–25)
CALCIUM SERPL-MCNC: 9.1 MG/DL (ref 8.4–10.2)
CHLORIDE SERPL-SCNC: 102 MMOL/L (ref 96–108)
CO2 SERPL-SCNC: 23 MMOL/L (ref 21–32)
CREAT SERPL-MCNC: 0.52 MG/DL (ref 0.6–1.3)
EOSINOPHIL # BLD AUTO: 0.05 THOUSAND/ÂΜL (ref 0–0.61)
EOSINOPHIL NFR BLD AUTO: 0 % (ref 0–6)
ERYTHROCYTE [DISTWIDTH] IN BLOOD BY AUTOMATED COUNT: 12.9 % (ref 11.6–15.1)
EST. AVERAGE GLUCOSE BLD GHB EST-MCNC: 108 MG/DL
GFR SERPL CREATININE-BSD FRML MDRD: 122 ML/MIN/1.73SQ M
GLUCOSE SERPL-MCNC: 85 MG/DL (ref 65–140)
HBA1C MFR BLD: 5.4 %
HCT VFR BLD AUTO: 35.6 % (ref 34.8–46.1)
HGB BLD-MCNC: 11.9 G/DL (ref 11.5–15.4)
IMM GRANULOCYTES # BLD AUTO: 0.06 THOUSAND/UL (ref 0–0.2)
IMM GRANULOCYTES NFR BLD AUTO: 1 % (ref 0–2)
LYMPHOCYTES # BLD AUTO: 1.97 THOUSANDS/ÂΜL (ref 0.6–4.47)
LYMPHOCYTES NFR BLD AUTO: 17 % (ref 14–44)
MCH RBC QN AUTO: 32.2 PG (ref 26.8–34.3)
MCHC RBC AUTO-ENTMCNC: 33.4 G/DL (ref 31.4–37.4)
MCV RBC AUTO: 96 FL (ref 82–98)
MONOCYTES # BLD AUTO: 0.67 THOUSAND/ÂΜL (ref 0.17–1.22)
MONOCYTES NFR BLD AUTO: 6 % (ref 4–12)
NEUTROPHILS # BLD AUTO: 8.75 THOUSANDS/ÂΜL (ref 1.85–7.62)
NEUTS SEG NFR BLD AUTO: 76 % (ref 43–75)
NRBC BLD AUTO-RTO: 0 /100 WBCS
PLATELET # BLD AUTO: 284 THOUSANDS/UL (ref 149–390)
PMV BLD AUTO: 10.6 FL (ref 8.9–12.7)
POTASSIUM SERPL-SCNC: 4.3 MMOL/L (ref 3.5–5.3)
PROT SERPL-MCNC: 6.4 G/DL (ref 6.4–8.4)
RBC # BLD AUTO: 3.7 MILLION/UL (ref 3.81–5.12)
SODIUM SERPL-SCNC: 134 MMOL/L (ref 135–147)
WBC # BLD AUTO: 11.52 THOUSAND/UL (ref 4.31–10.16)

## 2024-07-29 PROCEDURE — 83036 HEMOGLOBIN GLYCOSYLATED A1C: CPT | Performed by: NURSE PRACTITIONER

## 2024-07-29 PROCEDURE — 85025 COMPLETE CBC W/AUTO DIFF WBC: CPT

## 2024-07-29 PROCEDURE — 80053 COMPREHEN METABOLIC PANEL: CPT | Performed by: NURSE PRACTITIONER

## 2024-07-29 NOTE — TELEPHONE ENCOUNTER
Fax came in for a reevaluation treatment options ([facility] caremark). Was former pt of Sylvester Segundo. Will scan forms into chart

## 2024-07-29 NOTE — TELEPHONE ENCOUNTER
Toni calling from [facility] lab,asking if a cbc with diff can be ordered for patient as she went for blood work today, and the testing that was ordered needed to have a cbc with diff as well. I informed Bridget there is an order for a cbc with diff on patient chart, Bridget apologized and is going to call patient.     No further action needed.

## 2024-07-30 ENCOUNTER — TELEPHONE (OUTPATIENT)
Dept: OBGYN CLINIC | Facility: CLINIC | Age: 39
End: 2024-07-30

## 2024-07-30 ENCOUNTER — APPOINTMENT (OUTPATIENT)
Dept: LAB | Facility: CLINIC | Age: 39
End: 2024-07-30
Payer: COMMERCIAL

## 2024-07-30 DIAGNOSIS — Z3A.22 22 WEEKS GESTATION OF PREGNANCY: ICD-10-CM

## 2024-07-30 DIAGNOSIS — O09.892 SUPERVISION OF OTHER HIGH RISK PREGNANCIES, SECOND TRIMESTER: ICD-10-CM

## 2024-07-30 LAB — TREPONEMA PALLIDUM IGG+IGM AB [PRESENCE] IN SERUM OR PLASMA BY IMMUNOASSAY: NORMAL

## 2024-07-30 PROCEDURE — 86780 TREPONEMA PALLIDUM: CPT

## 2024-07-30 PROCEDURE — 36415 COLL VENOUS BLD VENIPUNCTURE: CPT

## 2024-07-30 NOTE — TELEPHONE ENCOUNTER
Please call lab.  RPR not run with other labs drawn yesterday.  Order says active not in process.  Please see if lab can run off of blood they have.

## 2024-07-31 DIAGNOSIS — E66.9 OBESITY (BMI 30-39.9): ICD-10-CM

## 2024-07-31 DIAGNOSIS — E66.01 SEVERE OBESITY DUE TO EXCESS CALORIES AFFECTING PREGNANCY, ANTEPARTUM (HCC): ICD-10-CM

## 2024-07-31 DIAGNOSIS — O09.529 ANTEPARTUM MULTIGRAVIDA OF ADVANCED MATERNAL AGE: ICD-10-CM

## 2024-07-31 DIAGNOSIS — Z3A.23 23 WEEKS GESTATION OF PREGNANCY: ICD-10-CM

## 2024-07-31 DIAGNOSIS — O24.419 GESTATIONAL DIABETES MELLITUS (GDM) IN SECOND TRIMESTER, GESTATIONAL DIABETES METHOD OF CONTROL UNSPECIFIED: ICD-10-CM

## 2024-07-31 DIAGNOSIS — O99.210 SEVERE OBESITY DUE TO EXCESS CALORIES AFFECTING PREGNANCY, ANTEPARTUM (HCC): ICD-10-CM

## 2024-07-31 RX ORDER — BLOOD SUGAR DIAGNOSTIC
STRIP MISCELLANEOUS
Qty: 360 STRIP | Refills: 1 | Status: SHIPPED | OUTPATIENT
Start: 2024-07-31 | End: 2024-08-01 | Stop reason: SDUPTHER

## 2024-07-31 RX ORDER — LANCETS 33 GAUGE
EACH MISCELLANEOUS
Qty: 360 EACH | Refills: 1 | Status: SHIPPED | OUTPATIENT
Start: 2024-07-31

## 2024-08-01 DIAGNOSIS — E66.01 SEVERE OBESITY DUE TO EXCESS CALORIES AFFECTING PREGNANCY, ANTEPARTUM (HCC): ICD-10-CM

## 2024-08-01 DIAGNOSIS — O09.529 ANTEPARTUM MULTIGRAVIDA OF ADVANCED MATERNAL AGE: ICD-10-CM

## 2024-08-01 DIAGNOSIS — Z3A.23 23 WEEKS GESTATION OF PREGNANCY: ICD-10-CM

## 2024-08-01 DIAGNOSIS — O99.210 SEVERE OBESITY DUE TO EXCESS CALORIES AFFECTING PREGNANCY, ANTEPARTUM (HCC): ICD-10-CM

## 2024-08-01 DIAGNOSIS — E66.9 OBESITY (BMI 30-39.9): ICD-10-CM

## 2024-08-01 DIAGNOSIS — O24.419 GESTATIONAL DIABETES MELLITUS (GDM) IN SECOND TRIMESTER, GESTATIONAL DIABETES METHOD OF CONTROL UNSPECIFIED: ICD-10-CM

## 2024-08-02 ENCOUNTER — TELEPHONE (OUTPATIENT)
Dept: PERINATAL CARE | Facility: CLINIC | Age: 39
End: 2024-08-02

## 2024-08-02 ENCOUNTER — ROUTINE PRENATAL (OUTPATIENT)
Dept: OBGYN CLINIC | Facility: CLINIC | Age: 39
End: 2024-08-02

## 2024-08-02 VITALS
BODY MASS INDEX: 37.56 KG/M2 | WEIGHT: 220 LBS | DIASTOLIC BLOOD PRESSURE: 70 MMHG | OXYGEN SATURATION: 99 % | HEART RATE: 98 BPM | HEIGHT: 64 IN | SYSTOLIC BLOOD PRESSURE: 122 MMHG

## 2024-08-02 DIAGNOSIS — Z14.8 CARRIER OF HEREDITARY DISEASE: Primary | ICD-10-CM

## 2024-08-02 DIAGNOSIS — O09.529 ANTEPARTUM MULTIGRAVIDA OF ADVANCED MATERNAL AGE: ICD-10-CM

## 2024-08-02 DIAGNOSIS — O09.892 SUPERVISION OF OTHER HIGH RISK PREGNANCIES, SECOND TRIMESTER: ICD-10-CM

## 2024-08-02 DIAGNOSIS — O09.819 PREGNANCY RESULTING FROM ASSISTED REPRODUCTIVE TECHNOLOGY, ANTEPARTUM: ICD-10-CM

## 2024-08-02 DIAGNOSIS — O24.419 GESTATIONAL DIABETES MELLITUS (GDM) IN SECOND TRIMESTER, GESTATIONAL DIABETES METHOD OF CONTROL UNSPECIFIED: ICD-10-CM

## 2024-08-02 DIAGNOSIS — Z3A.26 26 WEEKS GESTATION OF PREGNANCY: ICD-10-CM

## 2024-08-02 DIAGNOSIS — O34.211 MATERNAL CARE DUE TO LOW TRANSVERSE UTERINE SCAR FROM PREVIOUS CESAREAN DELIVERY: ICD-10-CM

## 2024-08-02 PROCEDURE — PNV: Performed by: OBSTETRICS & GYNECOLOGY

## 2024-08-02 RX ORDER — BLOOD SUGAR DIAGNOSTIC
STRIP MISCELLANEOUS
Qty: 360 STRIP | Refills: 0 | Status: SHIPPED | OUTPATIENT
Start: 2024-08-02

## 2024-08-02 NOTE — TELEPHONE ENCOUNTER
Reason for Call: Prescription Issue (One Touch Verio Testing Strips; per pt pharmacy is unable to see order placed on 7/31/24 for testing strips; pharmacy is reporting that previous order was discontinued by joseph) and Gestational Diabetes (26w5d  Diet-controlled)    Outcome: Spoke with pharmacy. They do not have a order for testing strips. Faxed order for testing strips to [facility] pharmacy.    Katlin Main RD  Diabetes Educator   [facility]  Diabetes and Pregnancy Program

## 2024-08-02 NOTE — PROGRESS NOTES
"    S: 38 y.o.  26w5d here for routine prenatal visit.        Chief Complaint   Patient presents with    Routine Prenatal Visit         OB complaints:  Contractions: few irregular   Leakage: no  Bleeding: no  Fetal movement: yes        O:  /70   Pulse 98   Ht 5' 4\" (1.626 m)   Wt 99.8 kg (220 lb)   LMP 2023 (Exact Date)   SpO2 99%   BMI 37.76 kg/m²       Review of Systems   Constitutional:  Negative for chills and fever.   Eyes:  Negative for visual disturbance.   Respiratory:  Negative for chest tightness and shortness of breath.    Cardiovascular:  Negative for chest pain.   Gastrointestinal:  Negative for abdominal pain, diarrhea, nausea and vomiting.   Genitourinary:  Negative for pelvic pain and vaginal bleeding.        As noted in HPI   Skin:  Negative for rash.   Neurological:  Negative for headaches.   All other systems reviewed and are negative.        Physical Exam  Constitutional:       General: She is not in acute distress.     Appearance: Normal appearance.   HENT:      Head: Normocephalic and atraumatic.   Cardiovascular:      Rate and Rhythm: Normal rate.   Pulmonary:      Effort: Pulmonary effort is normal. No respiratory distress.   Abdominal:      General: There is no distension.      Palpations: Abdomen is soft.      Tenderness: There is no abdominal tenderness. There is no guarding or rebound.      Comments: gravid   Neurological:      General: No focal deficit present.      Mental Status: She is alert.   Psychiatric:         Mood and Affect: Mood normal.         Behavior: Behavior normal.   Vitals and nursing note reviewed.           Fundal Height (cm): 28 cm  Fetal Heart Rate: 145    Pregravid Weight/BMI: 96.6 kg (213 lb) (BMI 36.54)  Current Weight: 99.8 kg (220 lb)   Total Weight Gain: 3.175 kg (7 lb)     Pre- Vitals      Flowsheet Row Most Recent Value   Prenatal Assessment    Fetal Heart Rate 145   Fundal Height (cm) 28 cm   Movement Present   Prenatal Vitals  "   Blood Pressure 122/70   Weight - Scale 99.8 kg (220 lb)   Urine Albumin/Glucose    Dilation/Effacement/Station    Vaginal Drainage    Edema                     Problem List       Asthma    Overview     Albuteral inhaler PRN         Gastroesophageal reflux disease without esophagitis    Obesity (BMI 30-39.9)    Overview     Pre gestational BMI:  36  Early glucola ordered             Seasonal allergies    Moderate episode of recurrent major depressive disorder (HCC)    Anxiety    Overview     No meds X years         Carrier of hereditary disease    Overview     + 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 Reaction to meds given w/csection:  rash on face         Pregnancy resulting from assisted reproductive technology, antepartum    Overview     IVF pregnancy with frozen embryo, +PGT  Fetal echo normal   Growth US at 32 weeks  NST/EMMANUEL weekly @ 36 weeks             Antepartum multigravida of advanced maternal age    26 weeks gestation of pregnancy    Obesity complicating pregnancy, childbirth, or puerperium, antepartum    Overview     -Pre-pregnancy weight 213 lbs. BMI 36.54.  -Current weight 218 lbs. BMI 37.56.  -Recommended weight gain 11 to 20 lbs.    - weekly surveillance 37 weeks            Gestational diabetes mellitus (GDM) in second trimester    Overview     Elevated early glucola,   3 hour glucola elevated at 19 weeks          Supervision of other high risk pregnancies, second trimester    Overview     Normal PGT-A, additional aneuploidy screening deferred   AFP low risk          Current Assessment & Plan     Growth US scheduled  OB precautions reviewed                               Paula Ballesteros MD  2024  3:04 PM

## 2024-08-16 ENCOUNTER — ULTRASOUND (OUTPATIENT)
Dept: PERINATAL CARE | Facility: OTHER | Age: 39
End: 2024-08-16
Payer: COMMERCIAL

## 2024-08-16 VITALS
HEIGHT: 64 IN | SYSTOLIC BLOOD PRESSURE: 112 MMHG | WEIGHT: 220.6 LBS | HEART RATE: 112 BPM | DIASTOLIC BLOOD PRESSURE: 80 MMHG | BODY MASS INDEX: 37.66 KG/M2

## 2024-08-16 DIAGNOSIS — E66.01 SEVERE OBESITY DUE TO EXCESS CALORIES AFFECTING PREGNANCY, ANTEPARTUM (HCC): ICD-10-CM

## 2024-08-16 DIAGNOSIS — O99.210 SEVERE OBESITY DUE TO EXCESS CALORIES AFFECTING PREGNANCY, ANTEPARTUM (HCC): ICD-10-CM

## 2024-08-16 DIAGNOSIS — O09.529 ANTEPARTUM MULTIGRAVIDA OF ADVANCED MATERNAL AGE: Primary | ICD-10-CM

## 2024-08-16 DIAGNOSIS — O09.819 PREGNANCY RESULTING FROM ASSISTED REPRODUCTIVE TECHNOLOGY, ANTEPARTUM: ICD-10-CM

## 2024-08-16 DIAGNOSIS — O24.410 DIET CONTROLLED GESTATIONAL DIABETES MELLITUS (GDM) IN THIRD TRIMESTER: ICD-10-CM

## 2024-08-16 DIAGNOSIS — Z3A.28 28 WEEKS GESTATION OF PREGNANCY: ICD-10-CM

## 2024-08-16 PROCEDURE — 76816 OB US FOLLOW-UP PER FETUS: CPT | Performed by: OBSTETRICS & GYNECOLOGY

## 2024-08-16 PROCEDURE — 99214 OFFICE O/P EST MOD 30 MIN: CPT | Performed by: OBSTETRICS & GYNECOLOGY

## 2024-08-16 NOTE — PROGRESS NOTES
"[facility]: Darlin Mckinney was seen today for fetal growth assessment ultrasound.  See ultrasound report under \"OB Procedures\" tab.   The time spent on this established patient on the encounter date included 8 minutes previsit service time reviewing records and precharting, 7 minutes face-to-face service time counseling regarding results and coordinating care, and  7 minutes charting, totalling 22 minutes.  Please don't hesitate to contact our office with any concerns or questions.  -Qi Ferreira MD    "

## 2024-08-29 ENCOUNTER — PATIENT MESSAGE (OUTPATIENT)
Dept: PERINATAL CARE | Facility: CLINIC | Age: 39
End: 2024-08-29

## 2024-08-29 ENCOUNTER — TELEMEDICINE (OUTPATIENT)
Dept: PERINATAL CARE | Facility: CLINIC | Age: 39
End: 2024-08-29
Payer: COMMERCIAL

## 2024-08-29 DIAGNOSIS — O99.213 OTHER OBESITY DUE TO EXCESS CALORIES AFFECTING PREGNANCY IN THIRD TRIMESTER: ICD-10-CM

## 2024-08-29 DIAGNOSIS — Z3A.30 30 WEEKS GESTATION OF PREGNANCY: ICD-10-CM

## 2024-08-29 DIAGNOSIS — E66.09 OTHER OBESITY DUE TO EXCESS CALORIES AFFECTING PREGNANCY IN THIRD TRIMESTER: ICD-10-CM

## 2024-08-29 DIAGNOSIS — O24.414 INSULIN CONTROLLED GESTATIONAL DIABETES MELLITUS (GDM) IN THIRD TRIMESTER: Primary | ICD-10-CM

## 2024-08-29 PROCEDURE — G0108 DIAB MANAGE TRN  PER INDIV: HCPCS | Performed by: DIETITIAN, REGISTERED

## 2024-08-29 RX ORDER — INSULIN GLARGINE 100 [IU]/ML
INJECTION, SOLUTION SUBCUTANEOUS
Qty: 15 ML | Refills: 0 | Status: SHIPPED | OUTPATIENT
Start: 2024-08-29 | End: 2024-11-03

## 2024-08-29 RX ORDER — PEN NEEDLE, DIABETIC 30 GX3/16"
NEEDLE, DISPOSABLE MISCELLANEOUS
Qty: 100 EACH | Refills: 3 | Status: SHIPPED | OUTPATIENT
Start: 2024-08-29 | End: 2024-11-03

## 2024-08-29 NOTE — PROGRESS NOTES
"  Thank you for referring your patient to [facility] Maternal Fetal Medicine Diabetes in Pregnancy Program.     Darlin Mckinney is a  38 y.o. female who presents today for Insulin Teaching.  Patient is at 30w4d gestation, Estimated Date of Delivery: 11/3/24. Please refer to blood sugar log under encounter tab for complete documentation of patient blood glucose levels.    Reviewed and updated the following from patients medical record: PMH, Problem List, Allergies, and Current Medications.      Visit Diagnosis:  Insulin controlled GDM    Labs:  No components found for: \"HGA1C\"    Labs Ordered:  Reordered HbA1c today      Anthropometrics:  Ht Readings from Last 3 Encounters:   24 5' 4\" (1.626 m)   24 5' 4\" (1.626 m)   24 5' 4\" (1.626 m)     Wt Readings from Last 3 Encounters:   24 100 kg (220 lb 9.6 oz)   24 99.8 kg (220 lb)   24 101 kg (222 lb)     Pre-gravid weight: 96.6 kg (213 lb)  Pre-gravid BMI: 36.54  Weight Change: 3.447 kg (7 lb 9.6 oz)  Weight gain recommendations: BMI (> 30) 11-20 lbs  Comments: may gain 12 more pounds for the remainderof the pregnancy    Recent Ultra Sound Results:  Date: 24  Fetal Growth: Normal  EMMANUEL: Normal  Next US date: 24    BG Log:      Stated she has tried several different snacks at bedtime & is now unable to lower her FBS.      Insulin Education:    Lantus Begin 28 units at 8 PM    The patient was instructed on the following:  Insulin administration times, insulin action.  Hypoglycemia signs, symptoms and treatment. Advised patient to test blood sugar at 3:00 am for first 3 mornings following insulin start to monitor for hypoglycemia  Increase in maternal-fetal surveillance with insulin initiation.  Side effects of hyperglycemia in pregnancy including macrosomia,  hypoglycemia, polyhydramnios, pre-term labor and stillbirth.  Continue to monitor blood glucoses via fingerstick fasting (goal 60 mg/dl to 90 mg/dl) and two hours post " "prandial (goal less than 120 mg/dl).  Non-stress testing two times weekly and EMMANUEL testing beginning at 32 weeks gestation.        Ultrasounds every 4 weeks at the [facility]etal [facility].  HbA1c every 6 to 8 weeks      Patient Stated Goal: \"I will eat 3 meals and 3 snacks each day, including protein at each\"  Goal Assessment: On track    Diabetes Self Management Support Plan outside of ongoing care: Spouse/Family    Date to report blood sugars: Tuesday, 24  Follow Up Date: Scheduled with EDGARDO Carvajal on 9/10/24    Danielle Szymanski, MS, RD, [facility]  Diabetes Educator  [facility] Maternal Fetal Medicine  Diabetes in Pregnancy Program  [address], [address]  [place] [address]       Virtual Regular Visit  Name: Darlin Mckinney      : 1985      MRN: [id number]  Encounter Provider: Danielle Szymanski  Encounter Date: 2024   Encounter department: [facility]  [facility] [place]    Verification of patient location: [place]    Patient is located at Other in the following state in which I hold an active license [place]    Assessment & Plan   1. Insulin controlled gestational diabetes mellitus (GDM) in third trimester  2. 30 weeks gestation of pregnancy  3. Other obesity due to excess calories affecting pregnancy in third trimester        Encounter provider Danielle Szymanski    The patient was identified by name and date of birth. Darlin Mckinney was informed that this is a telemedicine visit and that the visit is being conducted through the Epic Embedded platform. She agrees to proceed..  My office door was closed. No one else was in the room.  She acknowledged consent and understanding of privacy and security of the video platform. The patient has agreed to participate and understands they can discontinue the visit at any time.    Patient is aware this is a billable service.     History of Present Illness     Darlin Mckinney is a 38 y.o. female who presents with pregnancy    Review of " Systems    Objective     LMP 12/04/2023 (Exact Date)   Physical Exam--not perfomred    Visit Time  Total Visit Duration: 30 minutes

## 2024-09-05 ENCOUNTER — TREATMENT (OUTPATIENT)
Dept: PERINATAL CARE | Facility: CLINIC | Age: 39
End: 2024-09-05

## 2024-09-05 DIAGNOSIS — O24.414 INSULIN CONTROLLED GESTATIONAL DIABETES MELLITUS (GDM) IN THIRD TRIMESTER: Primary | ICD-10-CM

## 2024-09-05 NOTE — PROGRESS NOTES
"  Date: 09/05/24  Darlin Mckinney  1985  Estimated Date of Delivery: 11/3/24  31w4d  OB/GYN:[facility]    Diagnosis:  Insulin controlled GDM    Blood Sugar Logs Submitted via: Glucose Flowsheet                          Assessment and Plan:  Lantus --continue 28 units at 8 PM  1800 calorie (CHO: 45-15-45-15-45-30) (PRO:2-1-3-1-3-2) meal plan consisting of 3 meals and 3 snacks daily including protein at each  Advised patient to continue current meal plan  Avoid fasting for > 10 hours overnight  Continue SMBG 4 x per day (Fasting, 2 hour after start of each meal) with a One-Touch Verio Reflect. To report days she test at 1 hour after dinner rather that at 2 hours after dinner.   Walks & stretches after dinner nightly    Lab Work:   Lab Results   Component Value Date    HGBA1C 5.4 07/29/2024      HbA1c was reordered on 8/29/24    Ultrasound:       Date:8/16/24       Fetal Growth: Normal       EMMANUEL: Normal  Next: 9/16/24    Diabetes Self Management Support Plan outside of ongoing care: Spouse/Family   Patient Stated Goal: \"I will eat 3 meals and 3 snacks each day, including protein at each\"   Goal Assessment: On track    Date to report next: weekly     Danielle Szymanski, MS, RD, [facility]  Diabetes Educator  [facility] Maternal Fetal Medicine  Diabetes in Pregnancy Program  [address], [address]  [place] [address]    "

## 2024-09-16 ENCOUNTER — ULTRASOUND (OUTPATIENT)
Dept: PERINATAL CARE | Facility: OTHER | Age: 39
End: 2024-09-16
Payer: COMMERCIAL

## 2024-09-16 VITALS
HEART RATE: 92 BPM | WEIGHT: 219.8 LBS | DIASTOLIC BLOOD PRESSURE: 78 MMHG | SYSTOLIC BLOOD PRESSURE: 130 MMHG | BODY MASS INDEX: 37.52 KG/M2 | HEIGHT: 64 IN

## 2024-09-16 DIAGNOSIS — O09.819 PREGNANCY RESULTING FROM ASSISTED REPRODUCTIVE TECHNOLOGY, ANTEPARTUM: ICD-10-CM

## 2024-09-16 DIAGNOSIS — O34.211 MATERNAL CARE DUE TO LOW TRANSVERSE UTERINE SCAR FROM PREVIOUS CESAREAN DELIVERY: ICD-10-CM

## 2024-09-16 DIAGNOSIS — O09.529 ANTEPARTUM MULTIGRAVIDA OF ADVANCED MATERNAL AGE: ICD-10-CM

## 2024-09-16 DIAGNOSIS — O24.414 INSULIN CONTROLLED GESTATIONAL DIABETES MELLITUS (GDM) IN THIRD TRIMESTER: ICD-10-CM

## 2024-09-16 DIAGNOSIS — E66.01 SEVERE OBESITY DUE TO EXCESS CALORIES AFFECTING PREGNANCY, ANTEPARTUM (HCC): ICD-10-CM

## 2024-09-16 DIAGNOSIS — O99.210 SEVERE OBESITY DUE TO EXCESS CALORIES AFFECTING PREGNANCY, ANTEPARTUM (HCC): ICD-10-CM

## 2024-09-16 DIAGNOSIS — Z3A.33 33 WEEKS GESTATION OF PREGNANCY: Primary | ICD-10-CM

## 2024-09-16 PROCEDURE — 99214 OFFICE O/P EST MOD 30 MIN: CPT | Performed by: NURSE PRACTITIONER

## 2024-09-16 PROCEDURE — 76816 OB US FOLLOW-UP PER FETUS: CPT | Performed by: OBSTETRICS & GYNECOLOGY

## 2024-09-16 PROCEDURE — 59025 FETAL NON-STRESS TEST: CPT | Performed by: NURSE PRACTITIONER

## 2024-09-16 PROCEDURE — NC001 PR NO CHARGE: Performed by: OBSTETRICS & GYNECOLOGY

## 2024-09-16 NOTE — LETTER
NST sleeve cover sheet    Patient name: Darlin Mckinney  : 1985  MRN: [id number]    SHEREEN: Estimated Date of Delivery: 11/3/24    Obstetrician: [facility]    Reason(s) for testing: A2GDM    Testing frequency:    _x_  2x/wk  ___  1x/wk  ___  Dopplers  ___  BPP?      Last growth scan: __________, _________, _________    Baby:      Male   /   Female   /   [place]             Baby Name: ___________________            IOL or  C/S: _____________________

## 2024-09-16 NOTE — PROGRESS NOTES
Non-Stress Testing:    Non-Stress test, equipment, procedure, and expected outcomes explained. Reviewed fetal kick counts and when to call OB.Verified patient understanding of fetal kick counts with teach back method. Patient reports feeling daily fetal movements. Patient has no questions or concerns.     Reviewed non-stress test with EDGARDO Levy

## 2024-09-16 NOTE — PROGRESS NOTES
"[facility]: Darlin Mckinney was seen today for NST (found under the pregnancy episode) which I reviewed the RN assessment and agree, and fetal growth ultrasound (see ultrasound report under OB procedures tab).  See ultrasound report under \"OB Procedures\" tab.   Please don't hesitate to contact our office with any concerns or questions.  -Qi Ferreira MD      "

## 2024-09-17 ENCOUNTER — TELEPHONE (OUTPATIENT)
Age: 39
End: 2024-09-17

## 2024-09-17 NOTE — TELEPHONE ENCOUNTER
Please call patient  last seen for ob appt 8/2/24.   Please get her scheduled ASAP.  Has been going to [facility] appt.s

## 2024-09-20 ENCOUNTER — ROUTINE PRENATAL (OUTPATIENT)
Dept: PERINATAL CARE | Facility: OTHER | Age: 39
End: 2024-09-20
Payer: COMMERCIAL

## 2024-09-20 VITALS
HEIGHT: 64 IN | DIASTOLIC BLOOD PRESSURE: 80 MMHG | BODY MASS INDEX: 37.66 KG/M2 | SYSTOLIC BLOOD PRESSURE: 124 MMHG | WEIGHT: 220.6 LBS | HEART RATE: 92 BPM

## 2024-09-20 DIAGNOSIS — O24.414 INSULIN CONTROLLED GESTATIONAL DIABETES MELLITUS (GDM) IN THIRD TRIMESTER: ICD-10-CM

## 2024-09-20 DIAGNOSIS — O99.210 SEVERE OBESITY DUE TO EXCESS CALORIES AFFECTING PREGNANCY, ANTEPARTUM (HCC): ICD-10-CM

## 2024-09-20 DIAGNOSIS — O09.529 ANTEPARTUM MULTIGRAVIDA OF ADVANCED MATERNAL AGE: ICD-10-CM

## 2024-09-20 DIAGNOSIS — Z3A.33 33 WEEKS GESTATION OF PREGNANCY: Primary | ICD-10-CM

## 2024-09-20 DIAGNOSIS — E66.01 SEVERE OBESITY DUE TO EXCESS CALORIES AFFECTING PREGNANCY, ANTEPARTUM (HCC): ICD-10-CM

## 2024-09-20 PROCEDURE — 59025 FETAL NON-STRESS TEST: CPT | Performed by: OBSTETRICS & GYNECOLOGY

## 2024-09-20 NOTE — PROGRESS NOTES
Repeat Non-Stress Testing:    Patient verbalizes +FM. Pt denies ALL:               Leaking of fluid   Contractions   Vaginal bleeding   Decreased fetal movement    Patient is performing daily kick counts. Patient has no questions or concerns.   NST strip reviewed by Dr. Ferreira.

## 2024-09-23 ENCOUNTER — NURSE TRIAGE (OUTPATIENT)
Age: 39
End: 2024-09-23

## 2024-09-23 ENCOUNTER — TELEPHONE (OUTPATIENT)
Age: 39
End: 2024-09-23

## 2024-09-23 ENCOUNTER — HOSPITAL ENCOUNTER (OUTPATIENT)
Facility: HOSPITAL | Age: 39
End: 2024-09-23
Attending: OBSTETRICS & GYNECOLOGY | Admitting: OBSTETRICS & GYNECOLOGY
Payer: COMMERCIAL

## 2024-09-23 ENCOUNTER — HOSPITAL ENCOUNTER (OUTPATIENT)
Facility: HOSPITAL | Age: 39
Discharge: HOME | End: 2024-09-23
Attending: OBSTETRICS & GYNECOLOGY | Admitting: OBSTETRICS & GYNECOLOGY
Payer: COMMERCIAL

## 2024-09-23 VITALS
DIASTOLIC BLOOD PRESSURE: 96 MMHG | RESPIRATION RATE: 18 BRPM | HEART RATE: 93 BPM | SYSTOLIC BLOOD PRESSURE: 129 MMHG | TEMPERATURE: 98.2 F

## 2024-09-23 PROBLEM — O26.899 ABDOMINAL PAIN AFFECTING PREGNANCY: Status: ACTIVE | Noted: 2024-09-23

## 2024-09-23 PROBLEM — R03.0 ELEVATED BLOOD-PRESSURE READING WITHOUT DIAGNOSIS OF HYPERTENSION: Status: ACTIVE | Noted: 2024-09-23

## 2024-09-23 PROBLEM — R10.9 ABDOMINAL PAIN AFFECTING PREGNANCY: Status: ACTIVE | Noted: 2024-09-23

## 2024-09-23 LAB
ALBUMIN SERPL BCG-MCNC: 3.2 G/DL (ref 3.5–5)
ALP SERPL-CCNC: 161 U/L (ref 34–104)
ALT SERPL W P-5'-P-CCNC: 11 U/L (ref 7–52)
ANION GAP SERPL CALCULATED.3IONS-SCNC: 6 MMOL/L (ref 4–13)
AST SERPL W P-5'-P-CCNC: 15 U/L (ref 13–39)
BILIRUB SERPL-MCNC: 0.74 MG/DL (ref 0.2–1)
BUN SERPL-MCNC: 12 MG/DL (ref 5–25)
CALCIUM ALBUM COR SERPL-MCNC: 9.4 MG/DL (ref 8.3–10.1)
CALCIUM SERPL-MCNC: 8.8 MG/DL (ref 8.4–10.2)
CHLORIDE SERPL-SCNC: 104 MMOL/L (ref 96–108)
CO2 SERPL-SCNC: 21 MMOL/L (ref 21–32)
CREAT SERPL-MCNC: 0.62 MG/DL (ref 0.6–1.3)
CREAT UR-MCNC: 281.7 MG/DL
ERYTHROCYTE [DISTWIDTH] IN BLOOD BY AUTOMATED COUNT: 12.6 % (ref 11.6–15.1)
GFR SERPL CREATININE-BSD FRML MDRD: 115 ML/MIN/1.73SQ M
GLUCOSE SERPL-MCNC: 72 MG/DL (ref 65–140)
GLUCOSE SERPL-MCNC: 85 MG/DL (ref 65–140)
HCT VFR BLD AUTO: 35.6 % (ref 34.8–46.1)
HGB BLD-MCNC: 12.1 G/DL (ref 11.5–15.4)
MCH RBC QN AUTO: 30.9 PG (ref 26.8–34.3)
MCHC RBC AUTO-ENTMCNC: 34 G/DL (ref 31.4–37.4)
MCV RBC AUTO: 91 FL (ref 82–98)
PLATELET # BLD AUTO: 255 THOUSANDS/UL (ref 149–390)
PMV BLD AUTO: 10.8 FL (ref 8.9–12.7)
POTASSIUM SERPL-SCNC: 3.9 MMOL/L (ref 3.5–5.3)
PROT SERPL-MCNC: 6 G/DL (ref 6.4–8.4)
PROT UR-MCNC: 29 MG/DL
PROT/CREAT UR: 0.1 MG/G{CREAT} (ref 0–0.1)
RBC # BLD AUTO: 3.92 MILLION/UL (ref 3.81–5.12)
SODIUM SERPL-SCNC: 131 MMOL/L (ref 135–147)
WBC # BLD AUTO: 10.04 THOUSAND/UL (ref 4.31–10.16)

## 2024-09-23 PROCEDURE — 99203 OFFICE O/P NEW LOW 30 MIN: CPT

## 2024-09-23 PROCEDURE — NC001 PR NO CHARGE: Performed by: OBSTETRICS & GYNECOLOGY

## 2024-09-23 PROCEDURE — 85027 COMPLETE CBC AUTOMATED: CPT

## 2024-09-23 PROCEDURE — 80053 COMPREHEN METABOLIC PANEL: CPT

## 2024-09-23 PROCEDURE — 82948 REAGENT STRIP/BLOOD GLUCOSE: CPT

## 2024-09-23 PROCEDURE — 84156 ASSAY OF PROTEIN URINE: CPT

## 2024-09-23 PROCEDURE — 59025 FETAL NON-STRESS TEST: CPT

## 2024-09-23 PROCEDURE — 82570 ASSAY OF URINE CREATININE: CPT

## 2024-09-23 RX ADMIN — SODIUM CHLORIDE, SODIUM LACTATE, POTASSIUM CHLORIDE, AND CALCIUM CHLORIDE 1000 ML: .6; .31; .03; .02 INJECTION, SOLUTION INTRAVENOUS at 11:35

## 2024-09-23 NOTE — DISCHARGE INSTRUCTIONS
Pregnancy at 31 to 34 Weeks   WHAT YOU NEED TO KNOW:   What changes are happening with my body?  You may continue to have symptoms such as shortness of breath, heartburn, contractions, or swelling of your ankles and feet. You may be gaining about 1 pound a week now.   How do I care for myself at this stage of my pregnancy?       Eat a variety of healthy foods.  Healthy foods include fruits, vegetables, whole-grain breads, low-fat dairy foods, beans, lean meats, and fish. Drink liquids as directed. Ask how much liquid to drink each day and which liquids are best for you. Limit caffeine to less than 200 milligrams each day. Limit your intake of fish to 2 servings each week. Choose fish low in mercury such as canned light tuna, shrimp, salmon, cod, or tilapia. Do not  eat fish high in mercury such as swordfish, tilefish, ludwig mackerel, and shark.         Manage heartburn  by eating 4 or 5 small meals each day instead of large meals. Avoid spicy food.    Manage swelling  by lying down and putting your feet up.         Take prenatal vitamins as directed.  Your need for certain vitamins and minerals, such as folic acid, increases during pregnancy. Prenatal vitamins provide some of the extra vitamins and minerals you need. Prenatal vitamins may also help to decrease the risk of certain birth defects.         Talk to your healthcare provider about exercise.  Moderate exercise can help you stay fit. Your healthcare provider will help you plan an exercise program that is safe for you during pregnancy.         Do not smoke.  Smoking increases your risk of a miscarriage and other health problems during your pregnancy. Smoking can cause your baby to be born too early or weigh less at birth. Ask your healthcare provider for information if you need help quitting.    Do not drink alcohol.  Alcohol passes from your body to your baby through the placenta. It can affect your baby's brain development and cause fetal alcohol syndrome  (FAS). FAS is a group of conditions that causes mental, behavior, and growth problems.    Talk to your healthcare provider before you take any medicines.  Many medicines may harm your baby if you take them when you are pregnant. Do not take any medicines, vitamins, herbs, or supplements without first talking to your healthcare provider. Never use illegal or street drugs (such as marijuana or cocaine) while you are pregnant.    What are some safety tips during pregnancy?   Avoid hot tubs and saunas.  Do not use a hot tub or sauna while you are pregnant, especially during your first trimester. Hot tubs and saunas may raise your baby's temperature and increase the risk of birth defects.    Avoid toxoplasmosis.  This is an infection caused by eating raw meat or being around infected cat feces. It can cause birth defects, miscarriages, and other problems. Wash your hands after you touch raw meat. Make sure any meat is well-cooked before you eat it. Avoid raw eggs and unpasteurized milk. Use gloves or ask someone else to clean your cat's litter box while you are pregnant.       What changes are happening with my baby?  By 34 weeks, your baby may weigh more than 5 pounds. Your baby will be about 12 ½ inches long from the top of the head to the rump (baby's bottom). Your baby is gaining about ½ pound a week. Your baby's eyes open and close now. Your baby's kicks and movements are more forceful at this time.  What do I need to know about prenatal care?  Your healthcare provider will check your blood pressure and weight. You may also need the following:  A urine test  may also be done to check for sugar and protein. These can be signs of gestational diabetes or infection. Protein in your urine may also be a sign of preeclampsia. Preeclampsia is a condition that can develop during week 20 or later of your pregnancy. It causes high blood pressure, and it can cause problems with your kidneys and other organs.    A gestational  diabetes screen  may be done. Your healthcare provider may order either a 1-step or 2-step oral glucose tolerance test (OGTT).     1-step OGTT:  Your blood sugar level will be tested after you have not eaten for 8 hours (fasting). You will then be given a glucose drink. Your level will be tested again 1 hour and 2 hours after you finish the drink.    2-step OGTT:  You do not have to fast for the first part of the test. You will have the glucose drink at any time of day. Your blood sugar level will be checked 1 hour later. If your blood sugar is higher than a certain level, another test will be ordered. You will fast and your blood sugar level will be tested. You will have the glucose drink. Your blood will be tested again 1 hour, 2 hours, and 3 hours after you finish the glucose drink.    A Tdap vaccine  may be recommended by your healthcare provider.    Fundal height  is a measurement of your uterus to check your baby's growth. This number is usually the same as the number of weeks that you have been pregnant. Your healthcare provider may also check your baby's position.    Your baby's heart rate  will be checked.    When should I seek immediate care?   You develop a severe headache that does not go away.    You have new or increased vision changes, such as blurred or spotted vision.    You have new or increased swelling in your face or hands.    You have vaginal spotting or bleeding.    Your water broke or you feel warm water gushing or trickling from your vagina.    When should I call my obstetrician?   You have more than 5 contractions in 1 hour.    You notice any changes in your baby's movements.    You have abdominal cramps, pressure, or tightening.    You have a change in vaginal discharge.    You have chills or a fever.    You have vaginal itching, burning, or pain.    You have yellow, green, white, or foul-smelling vaginal discharge.    You have pain or burning when you urinate, less urine than usual, or  pink or bloody urine.    You have questions or concerns about your condition or care.    CARE AGREEMENT:   You have the right to help plan your care. Learn about your health condition and how it may be treated. Discuss treatment options with your healthcare providers to decide what care you want to receive. You always have the right to refuse treatment. The above information is an  only. It is not intended as medical advice for individual conditions or treatments. Talk to your doctor, nurse or pharmacist before following any medical regimen to see if it is safe and effective for you.  © Copyright [employer] 2022 Information is for End User's use only and may not be sold, redistributed or otherwise used for commercial purposes. All illustrations and images included in CareNotes® are the copyrighted property of A.D.A.M., Inc. or [employer]

## 2024-09-23 NOTE — PROCEDURES
Darlin Mckinney, a  at 34w1d with an SHEREEN of 11/3/2024, by Ultrasound, was seen at [facility] LABOR AND DELIVERY for the following procedure(s): $Procedure Type: NST]    Nonstress Test  Reason for NST: Routine  Variability: Moderate  Decelerations: None  Accelerations: Yes  Acoustic Stimulator: No  Baseline: 130 BPM  Uterine Irritability: No  Contractions: Not present                   Interpretation  Nonstress Test Interpretation: Reactive  Overall Impression: Reassuring  Comments: MATEO Bee

## 2024-09-23 NOTE — TELEPHONE ENCOUNTER
"34w1d  OB patient calling in to report abdominal tightening since this weekend. States it was intermittent this weekend, but now more constant. Patient states within the last hour, has had about 8-9 episodes of tightening, each lasting 30 seconds-1 minute. States tightening is occurring about every 5-10 minutes. States the tightening is not painful, but causes shortness of breath, pressure, and discomfort.     Denies lower back cramping, leakage of fluid, vaginal bleeding. Endorses good fetal movement. Reports \"lightening crotch,\" states belly band does not help. Patient states she is keeping well hydrated, drinking at least 2 L of fluid daily.     Informed patient msg will be sent to on-call provider for recommendations. Advised to continue monitoring for now and call back with worsening symptoms. Patient verbalized understanding.      Dr. Ballesteros responded advising L&D for evaluation. Patient called and made aware of the same. Patient verbalized understanding.  ETA: 9:40.  Reason for Disposition   Contractions < 10 minutes apart for 1 hour (i.e., 6 or more contractions an hour)    Protocols used: Pregnancy - Labor - -ADULT-[place]    "

## 2024-09-23 NOTE — PROGRESS NOTES
L&D Triage Note - OB/GYN  Darlin Mckinney 38 y.o. female MRN: [id number]  Unit/Bed#:  TRIAGE  Encounter: [id number]      ASSESSMENT:    Darlin Mckinney is a 38 y.o.  at 34w1d who was evaluated today in triage for contractions. SVE 0.5/50/-3 with no contractions on tocometer. The patient does not appear to be in labor and it is safe to discharge home.     PLAN:    1) Contractions  - SVE: 0.5/50/-3  - [place]: no contractions   - Encouraged PO hydration, warm bath/shower, reassurance    2) Elevated blood pressure without diagnosis of hypertension  - 136/91, 129/96 in triage  - CBC/CMP wnl, P/C 0.1    3) 34 weeks gestation of pregnancy  - Continue routine prenatal care  - Discharge from OB triage with labor precautions    - Reviewed rupture of membranes, false vs true labor, decreased fetal movement, and vaginal bleeding   - Pt to call provider with any concerns and follow up at her next scheduled prenatal appointment    [facility]    SUBJECTIVE:    Darlin Mckinney 38 y.o.  at 34w1d with an Estimated Date of Delivery: 11/3/24 presenting with abdominal pain. She reports having irregular contractions every 5-10 minutes since 5:00AM.       Her past obstetrical history is significant for 1LTCS for fetal intolerance..   This pregnancy has been A2GDM on 28 units of insulin qHS, IVF pregnancy AMA, asthma, anxiety, and depression.    Contractions: endorses  Leakage of fluid: Denies  Vaginal Bleeding: Denies  Fetal movement: present    OBJECTIVE:    Vitals:    24 1056   BP: 129/96   Pulse: 93   Resp:    Temp:        ROS:  Constitutional: Negative  Respiratory: Negative  Cardiovascular: Negative    Gastrointestinal: Negative    General Physical Exam:  General: Well appearing, no distress  Respiratory: Unlabored breathing  Cardiovascular: Regular rate.  Abdomen: Soft, gravid, nontender  Fundal Height: Appropriate for gestational age.  Extremities: Warm and well perfused.  Non  tender.      Fetal monitoring:  Fetal heart rate: NST reactive. Baseline 130 with moderate variability and accelerations and no decelerations.  Forman: Contraction Frequency (minutes): 0  Contraction Duration (seconds): 30-60  Contraction Intensity: Mild      Cervical Exam  SVE: 0.5/50/-3        Rocco Bee MD  9/23/2024  3:19 PM

## 2024-09-23 NOTE — DISCHARGE INSTR - AVS FIRST PAGE
Vaginal spotting is normal after today's examination.  Please call or return if you experience a gush of watery fluid, heavy vaginal bleeding, abdominal pain or cramping, decreased fetal movement.  Please follow-up with your routinely scheduled prenatal appointments.     Abdomen soft, non-tender, no guarding.

## 2024-09-23 NOTE — QUICK NOTE
Assumed care of patient from Dr. Bee who started her  labor workup, which was unremarkable other than mild, regular contractions every 3-5 minutes.  Contractions resolved after 1 L bolus of lactated ringer.  FHT has remained reactive.  Initial blood pressure notable for 136/96.  Patient denies any signs or symptoms of preeclampsia.  She states she was not preeclamptic or gestational hypertension in her last pregnancy.  Protein creatinine 0.1.  CBC unremarkable.  CMP unremarkable.  Patient has follow-up outpatient tomorrow.  Patient is stable for discharge home with close follow-up and concern for developing gestational hypertension.  Will message office tomorrow to watch blood pressure closely as if patient has another elevated blood pressure she will meet criteria for gestational hypertension.

## 2024-09-23 NOTE — PROGRESS NOTES
"OB/GYN  PN Visit  Darlin Mckinney  [id number]  2024  2:55 PM  Dr. Nancy Bai MD    S: 38 y.o.  34w1d here for PN visit.       Chief Complaint   Patient presents with    Routine Prenatal Visit     ED yesterday and was told they believe she has preeclampsia. No floaters or changes with vision. Has BP cuff at home, this morning BP was 133/93. Night before was 135/94     BP in hospital was 136/96    OB complaints:  Contractions: no  Leakage: no  Bleeding: no  Fetal movement: yes      O:  /88 (BP Location: Right arm, Patient Position: Sitting, Cuff Size: Adult)   Ht 5' 4\" (1.626 m)   Wt 102 kg (224 lb 6.4 oz)   LMP 2023 (Exact Date)   BMI 38.52 kg/m²       Review of Systems   Constitutional: Negative.    HENT: Negative.     Eyes: Negative.    Respiratory: Negative.     Cardiovascular: Negative.    Gastrointestinal: Negative.    Endocrine: Negative.    Genitourinary:         As noted in HPI   Musculoskeletal: Negative.    Skin: Negative.    Allergic/Immunologic: Negative.    Neurological: Negative.    Hematological: Negative.    Psychiatric/Behavioral: Negative.           Physical Exam  Constitutional:       General: She is not in acute distress.     Appearance: Normal appearance. She is well-developed.   Abdominal:      Palpations: Abdomen is soft.      Tenderness: There is no abdominal tenderness. There is no guarding.   Neurological:      Mental Status: She is alert and oriented to person, place, and time.   Skin:     General: Skin is warm and dry.   Psychiatric:         Behavior: Behavior normal.             Pregravid Weight/BMI: 96.6 kg (213 lb) (BMI 36.54)  Current Weight: 102 kg (224 lb 6.4 oz)   Total Weight Gain: 5.171 kg (11 lb 6.4 oz)   Pre- Vitals      Flowsheet Row Most Recent Value   Prenatal Assessment    Fetal Heart Rate 135   Fundal Height (cm) 35 cm   Movement Present   Presentation Vertex   Prenatal Vitals    Blood Pressure 126/88   Weight - Scale 102 kg (224 lb 6.4 oz) "   Urine Albumin/Glucose    Dilation/Effacement/Station    Vaginal Drainage    Edema              Problem List          Respiratory    Asthma    Overview     Albuteral inhaler PRN            Digestive    Gastroesophageal reflux disease without esophagitis       Endocrine    Gestational diabetes mellitus (GDM) in third trimester    Overview     Elevated early glucola,   3 hour glucola elevated at 19 weeks             Genitourinary    Maternal care due to low transverse uterine scar from previous  delivery    Overview     Awaiting records  Likely wants TOLAC  ? Reaction to meds given w/csection:  rash on face            Behavioral Health    Moderate episode of recurrent major depressive disorder (HCC)    Anxiety    Overview     No meds X years            Obstetrics/Gynecology    Pregnancy resulting from assisted reproductive technology, antepartum    Overview     IVF pregnancy with frozen embryo, +PGT  Fetal echo normal   Growth US at 32 weeks  NST/EMMANUEL weekly @ 36 weeks             Antepartum multigravida of advanced maternal age    34 weeks gestation of pregnancy    Obesity complicating pregnancy, childbirth, or puerperium, antepartum    Overview     -Pre-pregnancy weight 213 lbs. BMI 36.54.  -Current weight 218 lbs. BMI 37.56.  -Recommended weight gain 11 to 20 lbs.    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         Elevated blood-pressure reading without diagnosis of hypertension      BPs elevated in hospital yesterday, 30 mins apart  No other BP elevations in  office but elevated at home,  Advised to bring BP cuff  Preeclampsia labs ordered next week  Call if with HA, blurry vision, 140/90 BP at home  Discussed  risks  Discussed she should stay out of work now until due date due to multiple appointments needed, elevated BPs  She reports being uncomfortable at work     Follow-up in 1 week for BP check    Delivery paperwork given  Consent to procedures spontaneous assisted vaginal delivery or operative vaginal delivery or  birth   Visitation policy reviewed  Birth certificate information mother's work sheet  Birth plan guide   Authorization for release of protected health information for photographers  Birthing room support person rules and acknowledgement    Education  Perineal massage  Kick counts  Last weeks of pregnancy and when to notify the doctor    Breast pump rx sent      Future Appointments   Date Time Provider Department Center   2024  3:15 PM  NURSE LUCIANA 2 [facility]   2024 11:30 AM  NURSE LUCIANA 2 [facility]   2024  3:15 PM  NURSE LUCIANA 2 BE [facility]   10/3/2024 11:30 AM  NURSE LUCIANA 2 BE [facility]   10/9/2024  9:15 AM Paula Ballesteros MD [facility]   10/11/2024 11:30 AM  NURSE LUCIANA 2 BE [facility]   10/15/2024 11:00 AM  NURSE LUCIANA BE [facility]   10/18/2024 11:30 AM  NURSE LUCIANA 2 BE [facility]               Nancy Bai MD  2024  2:55 PM

## 2024-09-23 NOTE — TELEPHONE ENCOUNTER
Patient called she was seen at [facility] today for contractions she was having, patient was told that she was dehydrated and her blood pressure was elevated. Patient had said something was said about her delivering at 37 weeks and she wanted to know if she should go to work tomorrow 9/24/24 or if she should be on bed rest. I had scheduled her for an OB visit with Dr. Bai before her [facility] visit on 9/24/24. Please advise patient can be reached at [phone].

## 2024-09-24 ENCOUNTER — ROUTINE PRENATAL (OUTPATIENT)
Dept: OBGYN CLINIC | Facility: CLINIC | Age: 39
End: 2024-09-24

## 2024-09-24 ENCOUNTER — ULTRASOUND (OUTPATIENT)
Dept: PERINATAL CARE | Facility: OTHER | Age: 39
End: 2024-09-24
Payer: COMMERCIAL

## 2024-09-24 VITALS
BODY MASS INDEX: 38.31 KG/M2 | DIASTOLIC BLOOD PRESSURE: 88 MMHG | SYSTOLIC BLOOD PRESSURE: 126 MMHG | HEIGHT: 64 IN | WEIGHT: 224.4 LBS

## 2024-09-24 VITALS
SYSTOLIC BLOOD PRESSURE: 124 MMHG | HEIGHT: 64 IN | WEIGHT: 223 LBS | DIASTOLIC BLOOD PRESSURE: 80 MMHG | HEART RATE: 92 BPM | BODY MASS INDEX: 38.07 KG/M2

## 2024-09-24 DIAGNOSIS — Z3A.34 34 WEEKS GESTATION OF PREGNANCY: ICD-10-CM

## 2024-09-24 DIAGNOSIS — O40.3XX0 POLYHYDRAMNIOS IN THIRD TRIMESTER COMPLICATION, SINGLE OR UNSPECIFIED FETUS: ICD-10-CM

## 2024-09-24 DIAGNOSIS — O34.211 MATERNAL CARE DUE TO LOW TRANSVERSE UTERINE SCAR FROM PREVIOUS CESAREAN DELIVERY: ICD-10-CM

## 2024-09-24 DIAGNOSIS — R03.0 ELEVATED BLOOD-PRESSURE READING WITHOUT DIAGNOSIS OF HYPERTENSION: ICD-10-CM

## 2024-09-24 DIAGNOSIS — O09.813 PREGNANCY RESULTING FROM IN VITRO FERTILIZATION IN THIRD TRIMESTER: ICD-10-CM

## 2024-09-24 DIAGNOSIS — O43.193: ICD-10-CM

## 2024-09-24 DIAGNOSIS — Z3A.34 34 WEEKS GESTATION OF PREGNANCY: Primary | ICD-10-CM

## 2024-09-24 DIAGNOSIS — O09.819 PREGNANCY RESULTING FROM ASSISTED REPRODUCTIVE TECHNOLOGY, ANTEPARTUM: ICD-10-CM

## 2024-09-24 DIAGNOSIS — O09.93 SUPERVISION OF HIGH-RISK PREGNANCY, THIRD TRIMESTER: ICD-10-CM

## 2024-09-24 DIAGNOSIS — Z14.8 CARRIER OF HEREDITARY DISEASE: ICD-10-CM

## 2024-09-24 DIAGNOSIS — O24.414 INSULIN CONTROLLED GESTATIONAL DIABETES MELLITUS (GDM) IN THIRD TRIMESTER: Primary | ICD-10-CM

## 2024-09-24 DIAGNOSIS — O24.414 INSULIN CONTROLLED GESTATIONAL DIABETES MELLITUS (GDM) IN THIRD TRIMESTER: ICD-10-CM

## 2024-09-24 DIAGNOSIS — O09.529 ANTEPARTUM MULTIGRAVIDA OF ADVANCED MATERNAL AGE: ICD-10-CM

## 2024-09-24 PROBLEM — R10.9 ABDOMINAL PAIN AFFECTING PREGNANCY: Status: RESOLVED | Noted: 2024-09-23 | Resolved: 2024-09-24

## 2024-09-24 PROBLEM — O26.899 ABDOMINAL PAIN AFFECTING PREGNANCY: Status: RESOLVED | Noted: 2024-09-23 | Resolved: 2024-09-24

## 2024-09-24 PROCEDURE — 99213 OFFICE O/P EST LOW 20 MIN: CPT | Performed by: OBSTETRICS & GYNECOLOGY

## 2024-09-24 PROCEDURE — PNV: Performed by: OBSTETRICS & GYNECOLOGY

## 2024-09-24 PROCEDURE — 76815 OB US LIMITED FETUS(S): CPT | Performed by: OBSTETRICS & GYNECOLOGY

## 2024-09-24 NOTE — LETTER
September 24, 2024     Patient: Darlin Mckinney  YOB: 1985  Date of Visit: 9/24/2024      To Whom it May Concern:    Darlin Mckinney is under my professional care. Darlin was seen in my office on 9/24/2024. Darlin may not return to work at this time. She was recommended to stay out of work until her due date of 11/3/2024. Please forward additional necessary paperwork    If you have any questions or concerns, please don't hesitate to call.         Sincerely,          Nancy Bai MD        CC: No Recipients

## 2024-09-24 NOTE — PROGRESS NOTES
Fetal testing done at 34w2d.  NST done in triage yesterday so we only did the EMMANUEL today. Succenturiate lobe suggested on her US today. EMMANUEL with mild polyhydramnios that is stable since her last scan. Patient is GDMA2 on Insulin. Incidental BPP 8/8.    Discussed new findings of the succenturiate lobe and importance of making sure it is removed at the time of delivery.    Pre visit time reviewing her records 5 minutes  Face to face time 5 minutes  Post visit time on documentation of note, updating her problem list, adding orders and prescriptions 5 minutes.  Procedures that were completed today were charged separately.   The level of decision making was straight forward    Katarina Bull MD

## 2024-09-24 NOTE — LETTER
September 25, 2024     Nancy Bai MD  [address]  [address]  [place] [address]    Patient: Darlin Mckinney   YOB: 1985   Date of Visit: 9/24/2024       Dear Dr. Bai:    Thank you for referring Darlin Mckinney to me for evaluation. Below are my notes for this consultation.    If you have questions, please do not hesitate to call me. I look forward to following your patient along with you.         Sincerely,        Katarina Bull MD        CC: No Recipients    Katarina Bull MD  9/25/2024 10:08 AM  Sign when Signing Visit  Fetal testing done at 34w2d.  NST done in triage yesterday so we only did the EMMANUEL today. Succenturiate lobe suggested on her US today. EMMANUEL with mild polyhydramnios that is stable since her last scan. Patient is GDMA2 on Insulin. Incidental BPP 8/8.    Discussed new findings of the succenturiate lobe and importance of making sure it is removed at the time of delivery.    Pre visit time reviewing her records 5 minutes  Face to face time 5 minutes  Post visit time on documentation of note, updating her problem list, adding orders and prescriptions 5 minutes.  Procedures that were completed today were charged separately.   The level of decision making was straight forward    Katarina Bull MD

## 2024-09-25 PROBLEM — O09.813 PREGNANCY RESULTING FROM IN VITRO FERTILIZATION IN THIRD TRIMESTER: Status: ACTIVE | Noted: 2024-04-01

## 2024-09-25 PROBLEM — O43.193: Status: ACTIVE | Noted: 2024-09-25

## 2024-09-25 PROBLEM — O40.3XX0 POLYHYDRAMNIOS IN THIRD TRIMESTER: Status: ACTIVE | Noted: 2024-09-25

## 2024-09-26 PROBLEM — O24.414 INSULIN CONTROLLED GESTATIONAL DIABETES MELLITUS (GDM) IN THIRD TRIMESTER: Status: ACTIVE | Noted: 2024-05-15

## 2024-09-27 ENCOUNTER — ROUTINE PRENATAL (OUTPATIENT)
Dept: PERINATAL CARE | Facility: OTHER | Age: 39
End: 2024-09-27
Payer: COMMERCIAL

## 2024-09-27 VITALS
DIASTOLIC BLOOD PRESSURE: 78 MMHG | HEART RATE: 104 BPM | HEIGHT: 64 IN | BODY MASS INDEX: 37.66 KG/M2 | SYSTOLIC BLOOD PRESSURE: 124 MMHG | WEIGHT: 220.6 LBS

## 2024-09-27 DIAGNOSIS — Z3A.34 34 WEEKS GESTATION OF PREGNANCY: ICD-10-CM

## 2024-09-27 DIAGNOSIS — O24.414 INSULIN CONTROLLED GESTATIONAL DIABETES MELLITUS (GDM) IN THIRD TRIMESTER: Primary | ICD-10-CM

## 2024-09-27 LAB
DME PARACHUTE DELIVERY DATE ACTUAL: NORMAL
DME PARACHUTE DELIVERY DATE REQUESTED: NORMAL
DME PARACHUTE ITEM DESCRIPTION: NORMAL
DME PARACHUTE ORDER STATUS: NORMAL
DME PARACHUTE SUPPLIER NAME: NORMAL
DME PARACHUTE SUPPLIER PHONE: NORMAL

## 2024-09-27 PROCEDURE — 59025 FETAL NON-STRESS TEST: CPT | Performed by: NURSE PRACTITIONER

## 2024-09-27 NOTE — PROGRESS NOTES
"[facility]: Ms. Mckinney was seen today at 34w5d gestational age for NST (found under the pregnancy episode) which I reviewed the RN assessment and agree.  See ultrasound report under \"OB Procedures\" tab.    Maryana REYNOSO  "

## 2024-09-27 NOTE — PROGRESS NOTES
[facility]: Ms. Mckinney was seen today at 34w5d gestational age for NST (found under the pregnancy episode) which I reviewed the RN assessment and agree.       Maryana REYNOSO

## 2024-09-27 NOTE — PROGRESS NOTES
Repeat Non-Stress Testing:    Patient verbalizes +FM. Pt denies ALL:               Leaking of fluid   Contractions   Vaginal bleeding   Decreased fetal movement    Patient is performing daily kick counts. Patient has no questions or concerns.   NST strip reviewed by EDGARDO Levy.

## 2024-09-27 NOTE — PROGRESS NOTES
"OB/GYN  PN Visit  Darlin Mckinney  [id number]  2024  2:53 PM  Dr. Nancy Bai MD    S: 38 y.o.  35 1/7 d here for PN visit.       Chief Complaint   Patient presents with    Routine Prenatal Visit         OB complaints:  Contractions: no  Leakage: no  Bleeding: no  Fetal movement: yes      O:  /80   Pulse 91   Ht 5' 4\" (1.626 m)   Wt 101 kg (222 lb 9.6 oz)   LMP 2023 (Exact Date)   BMI 38.21 kg/m²       Review of Systems   Constitutional: Negative.    HENT: Negative.     Eyes: Negative.    Respiratory: Negative.     Cardiovascular: Negative.    Gastrointestinal: Negative.    Endocrine: Negative.    Genitourinary:         As noted in HPI   Musculoskeletal: Negative.    Skin: Negative.    Allergic/Immunologic: Negative.    Neurological: Negative.    Hematological: Negative.    Psychiatric/Behavioral: Negative.           Physical Exam  Constitutional:       General: She is not in acute distress.     Appearance: Normal appearance. She is well-developed.   Abdominal:      Palpations: Abdomen is soft.      Tenderness: There is no abdominal tenderness. There is no guarding.   Neurological:      Mental Status: She is alert and oriented to person, place, and time.   Skin:     General: Skin is warm and dry.   Psychiatric:         Behavior: Behavior normal.             Pregravid Weight/BMI: 96.6 kg (213 lb) (BMI 36.54)  Current Weight: 101 kg (222 lb 9.6 oz)   Total Weight Gain: 4.355 kg (9 lb 9.6 oz)   Pre- Vitals      Flowsheet Row Most Recent Value   Prenatal Assessment    Fetal Heart Rate 150   Fundal Height (cm) 37 cm   Movement Present   Presentation Vertex   Prenatal Vitals    Blood Pressure 120/80   Weight - Scale 101 kg (222 lb 9.6 oz)   Urine Albumin/Glucose    Dilation/Effacement/Station    Vaginal Drainage    Edema              Problem List          Respiratory    Asthma    Overview     Albuteral inhaler PRN            Digestive    Gastroesophageal reflux disease without esophagitis "       Endocrine    Insulin controlled gestational diabetes mellitus (GDM) in third trimester    Overview     Elevated early glucola,   3 hour glucola elevated at 19 weeks             Genitourinary    Maternal care due to low transverse uterine scar from previous  delivery    Overview     Awaiting records - no records obtained  Likely wants TOLAC  ? Reaction to meds given w/csection:  rash on face   calculcator  67.8%  Discussed  risks including uterine rupture  Discussed if delivery indicated at 37 weeks due HTN that predicted chance of success with unfavorable cervix is less            Behavioral Health    Moderate episode of recurrent major depressive disorder (HCC)    Anxiety    Overview     No meds X years            Other Pediatrics    Placenta succenturiata, third trimester    Overview     May increase risk for retained portion of placenta after delivery.             Obstetrics/Gynecology    Pregnancy resulting from in vitro fertilization in third trimester    Overview     IVF pregnancy with frozen embryo, +PGT  Fetal echo normal   Growth US at 32 weeks  NST/EMMANUEL weekly @ 36 weeks             Antepartum multigravida of advanced maternal age    35 weeks gestation of pregnancy    Obesity complicating pregnancy, childbirth, or puerperium, antepartum    Overview     -Pre-pregnancy weight 213 lbs. BMI 36.54.  -Current weight 218 lbs. BMI 37.56.  -Recommended weight gain 11 to 20 lbs.    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  FOB carrier: mucopolysaccharidosis type 1  only         Elevated blood-pressure reading without diagnosis of hypertension      Has NST and EMMANUEL today  Next US 10/15/24  This am was 139/83  She reports BP at home was /97 and recheck was 142/95 yesterday, today it was 124/88  She did not bring her cuff  BP in office in normal  Strongly encouraged to bring BP cuff  Suggested to call us if BP is elevated, since we would like to double and see her the same day as the BP elevations  No headache or blurry vision  Follow-up in weeks  GBS next visit    Declines the following Vaccines in pregnancy  TDAP 27-36  RSV vaccine 32-36 weeks  Flu vaccine anytime  COVID vaccine recommended      Future Appointments   Date Time Provider Department Center   2024  3:15 PM  NURSE LUCIANA 2 BE [facility]   10/3/2024 11:30 AM  NURSE LUCIANA 2 BE [facility]   10/9/2024  9:15 AM Paula Ballesteros MD [facility]   10/11/2024 11:30 AM  NURSE LUCIANA 2 BE [facility]   10/15/2024 11:00 AM  NURSE LUCIANA BE [facility]   10/18/2024 11:30 AM  NURSE LUCIANA 2 BE [facility]               Nancy Bai MD  2024  2:53 PM

## 2024-09-30 ENCOUNTER — ULTRASOUND (OUTPATIENT)
Dept: PERINATAL CARE | Facility: OTHER | Age: 39
End: 2024-09-30
Payer: COMMERCIAL

## 2024-09-30 ENCOUNTER — ROUTINE PRENATAL (OUTPATIENT)
Dept: OBGYN CLINIC | Facility: CLINIC | Age: 39
End: 2024-09-30

## 2024-09-30 VITALS
HEART RATE: 92 BPM | DIASTOLIC BLOOD PRESSURE: 68 MMHG | SYSTOLIC BLOOD PRESSURE: 118 MMHG | HEIGHT: 64 IN | WEIGHT: 222.4 LBS | BODY MASS INDEX: 37.97 KG/M2

## 2024-09-30 VITALS
BODY MASS INDEX: 38 KG/M2 | DIASTOLIC BLOOD PRESSURE: 80 MMHG | HEIGHT: 64 IN | WEIGHT: 222.6 LBS | SYSTOLIC BLOOD PRESSURE: 120 MMHG | HEART RATE: 91 BPM

## 2024-09-30 DIAGNOSIS — Z14.8 CARRIER OF HEREDITARY DISEASE: Primary | ICD-10-CM

## 2024-09-30 DIAGNOSIS — O99.210 OTHER OBESITY DUE TO EXCESS CALORIES AFFECTING PREGNANCY, ANTEPARTUM: ICD-10-CM

## 2024-09-30 DIAGNOSIS — O24.414 INSULIN CONTROLLED GESTATIONAL DIABETES MELLITUS (GDM) IN THIRD TRIMESTER: ICD-10-CM

## 2024-09-30 DIAGNOSIS — E66.09 OTHER OBESITY DUE TO EXCESS CALORIES AFFECTING PREGNANCY, ANTEPARTUM: ICD-10-CM

## 2024-09-30 DIAGNOSIS — O24.414 INSULIN CONTROLLED GESTATIONAL DIABETES MELLITUS (GDM) IN THIRD TRIMESTER: Primary | ICD-10-CM

## 2024-09-30 DIAGNOSIS — O34.211 MATERNAL CARE DUE TO LOW TRANSVERSE UTERINE SCAR FROM PREVIOUS CESAREAN DELIVERY: ICD-10-CM

## 2024-09-30 DIAGNOSIS — O09.529 ANTEPARTUM MULTIGRAVIDA OF ADVANCED MATERNAL AGE: ICD-10-CM

## 2024-09-30 DIAGNOSIS — O09.892 SUPERVISION OF OTHER HIGH RISK PREGNANCIES, SECOND TRIMESTER: ICD-10-CM

## 2024-09-30 DIAGNOSIS — Z3A.35 35 WEEKS GESTATION OF PREGNANCY: ICD-10-CM

## 2024-09-30 DIAGNOSIS — O09.813 PREGNANCY RESULTING FROM IN VITRO FERTILIZATION IN THIRD TRIMESTER: ICD-10-CM

## 2024-09-30 PROCEDURE — 59025 FETAL NON-STRESS TEST: CPT | Performed by: OBSTETRICS & GYNECOLOGY

## 2024-09-30 PROCEDURE — 76815 OB US LIMITED FETUS(S): CPT | Performed by: OBSTETRICS & GYNECOLOGY

## 2024-09-30 PROCEDURE — PNV: Performed by: OBSTETRICS & GYNECOLOGY

## 2024-09-30 NOTE — LETTER
October 1, 2024     Nancy Bai MD  [address]  [address]  [place] [address]    Patient: Darlin Mckinney   YOB: 1985   Date of Visit: 9/30/2024       Dear Dr. Bai:    Thank you for referring Darlin Mckinney to me for evaluation. Below are my notes for this consultation.    If you have questions, please do not hesitate to call me. I look forward to following your patient along with you.         Sincerely,        Katarina Bull MD        CC: No Recipients    Katarina Bull MD  10/1/2024  3:14 AM  Sign when Signing Visit  Reports to Dr Bai some elevated bp at home not confirmed by the bp cuffs in our office or Dr Bai's office. Nursing encouraged Darlin to bring her in cuff for us to compare. Nursing also feels maybe Darlin is checking bp too soon after an activity. Offered to order baseline preeclamptic labs which Darlin declined.  Darlin is planning to bring in her bp cuff to her next visit.    Katarina Bull MD

## 2024-09-30 NOTE — PROGRESS NOTES
Repeat Non-Stress Testing:    Patient verbalizes +FM. Pt denies ALL:               Leaking of fluid   Contractions   Vaginal bleeding   Decreased fetal movement    Patient is performing daily kick counts. Patient has no questions or concerns.   NST strip reviewed by Dr. Bull.    Patient instructed to bring in her BP cuff/home monitor with next visit so we can go over her machine and check readings.

## 2024-10-01 NOTE — PROGRESS NOTES
Reports to Dr Bai some elevated bp at home not confirmed by the bp cuffs in our office or Dr Bai's office. Nursing encouraged Darlin to bring her in cuff for us to compare. Nursing also feels maybe Darlin is checking bp too soon after an activity. Offered to order baseline preeclamptic labs which Darlin declined.  Darlin is planning to bring in her bp cuff to her next visit.    Katarina Bull MD

## 2024-10-03 ENCOUNTER — APPOINTMENT (OUTPATIENT)
Dept: LAB | Facility: CLINIC | Age: 39
End: 2024-10-03
Payer: COMMERCIAL

## 2024-10-03 ENCOUNTER — ROUTINE PRENATAL (OUTPATIENT)
Dept: PERINATAL CARE | Facility: OTHER | Age: 39
End: 2024-10-03
Payer: COMMERCIAL

## 2024-10-03 ENCOUNTER — APPOINTMENT (OUTPATIENT)
Dept: LAB | Facility: HOSPITAL | Age: 39
End: 2024-10-03
Payer: COMMERCIAL

## 2024-10-03 VITALS
HEART RATE: 102 BPM | WEIGHT: 222.6 LBS | HEIGHT: 64 IN | DIASTOLIC BLOOD PRESSURE: 80 MMHG | BODY MASS INDEX: 38 KG/M2 | SYSTOLIC BLOOD PRESSURE: 122 MMHG

## 2024-10-03 DIAGNOSIS — Z3A.35 35 WEEKS GESTATION OF PREGNANCY: ICD-10-CM

## 2024-10-03 DIAGNOSIS — O24.414 INSULIN CONTROLLED GESTATIONAL DIABETES MELLITUS (GDM) IN THIRD TRIMESTER: Primary | ICD-10-CM

## 2024-10-03 DIAGNOSIS — R03.0 ELEVATED BLOOD-PRESSURE READING WITHOUT DIAGNOSIS OF HYPERTENSION: ICD-10-CM

## 2024-10-03 DIAGNOSIS — Z3A.34 34 WEEKS GESTATION OF PREGNANCY: ICD-10-CM

## 2024-10-03 LAB
ALBUMIN SERPL BCG-MCNC: 3.2 G/DL (ref 3.5–5)
ALP SERPL-CCNC: 166 U/L (ref 34–104)
ALT SERPL W P-5'-P-CCNC: 10 U/L (ref 7–52)
ANION GAP SERPL CALCULATED.3IONS-SCNC: 8 MMOL/L (ref 4–13)
AST SERPL W P-5'-P-CCNC: 16 U/L (ref 13–39)
BILIRUB SERPL-MCNC: 0.59 MG/DL (ref 0.2–1)
BUN SERPL-MCNC: 9 MG/DL (ref 5–25)
CALCIUM ALBUM COR SERPL-MCNC: 9.2 MG/DL (ref 8.3–10.1)
CALCIUM SERPL-MCNC: 8.6 MG/DL (ref 8.4–10.2)
CHLORIDE SERPL-SCNC: 104 MMOL/L (ref 96–108)
CO2 SERPL-SCNC: 22 MMOL/L (ref 21–32)
CREAT SERPL-MCNC: 0.61 MG/DL (ref 0.6–1.3)
CREAT UR-MCNC: 148.7 MG/DL
ERYTHROCYTE [DISTWIDTH] IN BLOOD BY AUTOMATED COUNT: 12.7 % (ref 11.6–15.1)
EST. AVERAGE GLUCOSE BLD GHB EST-MCNC: 114 MG/DL
GFR SERPL CREATININE-BSD FRML MDRD: 115 ML/MIN/1.73SQ M
GLUCOSE SERPL-MCNC: 65 MG/DL (ref 65–140)
HBA1C MFR BLD: 5.6 %
HCT VFR BLD AUTO: 36.3 % (ref 34.8–46.1)
HGB BLD-MCNC: 12 G/DL (ref 11.5–15.4)
MCH RBC QN AUTO: 30.5 PG (ref 26.8–34.3)
MCHC RBC AUTO-ENTMCNC: 33.1 G/DL (ref 31.4–37.4)
MCV RBC AUTO: 92 FL (ref 82–98)
PLATELET # BLD AUTO: 254 THOUSANDS/UL (ref 149–390)
PMV BLD AUTO: 11.9 FL (ref 8.9–12.7)
POTASSIUM SERPL-SCNC: 4.2 MMOL/L (ref 3.5–5.3)
PROT SERPL-MCNC: 6.1 G/DL (ref 6.4–8.4)
PROT UR-MCNC: 14.8 MG/DL
PROT/CREAT UR: 0.1 MG/G{CREAT} (ref 0–0.1)
RBC # BLD AUTO: 3.93 MILLION/UL (ref 3.81–5.12)
SODIUM SERPL-SCNC: 134 MMOL/L (ref 135–147)
WBC # BLD AUTO: 12.31 THOUSAND/UL (ref 4.31–10.16)

## 2024-10-03 PROCEDURE — 59025 FETAL NON-STRESS TEST: CPT | Performed by: NURSE PRACTITIONER

## 2024-10-03 PROCEDURE — 80053 COMPREHEN METABOLIC PANEL: CPT

## 2024-10-03 PROCEDURE — 84156 ASSAY OF PROTEIN URINE: CPT

## 2024-10-03 PROCEDURE — 36415 COLL VENOUS BLD VENIPUNCTURE: CPT

## 2024-10-03 PROCEDURE — 82570 ASSAY OF URINE CREATININE: CPT

## 2024-10-03 PROCEDURE — 85027 COMPLETE CBC AUTOMATED: CPT

## 2024-10-03 PROCEDURE — 83036 HEMOGLOBIN GLYCOSYLATED A1C: CPT | Performed by: DIETITIAN, REGISTERED

## 2024-10-03 NOTE — PROGRESS NOTES
[facility]: Ms. Mckinney was seen today at 35w4d gestational age for NST (found under the pregnancy episode) which I reviewed the RN assessment and agree.       Maryana REYNOSO

## 2024-10-09 ENCOUNTER — ROUTINE PRENATAL (OUTPATIENT)
Dept: OBGYN CLINIC | Facility: CLINIC | Age: 39
End: 2024-10-09
Payer: COMMERCIAL

## 2024-10-09 ENCOUNTER — APPOINTMENT (OUTPATIENT)
Dept: LAB | Facility: CLINIC | Age: 39
End: 2024-10-09
Payer: COMMERCIAL

## 2024-10-09 VITALS
DIASTOLIC BLOOD PRESSURE: 90 MMHG | HEART RATE: 103 BPM | HEIGHT: 64 IN | BODY MASS INDEX: 38.28 KG/M2 | OXYGEN SATURATION: 98 % | WEIGHT: 224.2 LBS | SYSTOLIC BLOOD PRESSURE: 136 MMHG

## 2024-10-09 DIAGNOSIS — O34.211 MATERNAL CARE DUE TO LOW TRANSVERSE UTERINE SCAR FROM PREVIOUS CESAREAN DELIVERY: ICD-10-CM

## 2024-10-09 DIAGNOSIS — Z3A.36 36 WEEKS GESTATION OF PREGNANCY: ICD-10-CM

## 2024-10-09 DIAGNOSIS — O99.210 OTHER OBESITY DUE TO EXCESS CALORIES AFFECTING PREGNANCY, ANTEPARTUM: ICD-10-CM

## 2024-10-09 DIAGNOSIS — O09.813 PREGNANCY RESULTING FROM IN VITRO FERTILIZATION IN THIRD TRIMESTER: ICD-10-CM

## 2024-10-09 DIAGNOSIS — O13.3 GESTATIONAL HYPERTENSION, THIRD TRIMESTER: ICD-10-CM

## 2024-10-09 DIAGNOSIS — O34.211 MATERNAL CARE DUE TO LOW TRANSVERSE UTERINE SCAR FROM PREVIOUS CESAREAN DELIVERY: Primary | ICD-10-CM

## 2024-10-09 DIAGNOSIS — O24.414 INSULIN CONTROLLED GESTATIONAL DIABETES MELLITUS (GDM) IN THIRD TRIMESTER: ICD-10-CM

## 2024-10-09 DIAGNOSIS — O09.93 SUPERVISION OF HIGH RISK PREGNANCY IN THIRD TRIMESTER: ICD-10-CM

## 2024-10-09 DIAGNOSIS — E66.09 OTHER OBESITY DUE TO EXCESS CALORIES AFFECTING PREGNANCY, ANTEPARTUM: ICD-10-CM

## 2024-10-09 LAB
ALBUMIN SERPL BCG-MCNC: 3.2 G/DL (ref 3.5–5)
ALP SERPL-CCNC: 181 U/L (ref 34–104)
ALT SERPL W P-5'-P-CCNC: 12 U/L (ref 7–52)
ANION GAP SERPL CALCULATED.3IONS-SCNC: 8 MMOL/L (ref 4–13)
AST SERPL W P-5'-P-CCNC: 17 U/L (ref 13–39)
BILIRUB SERPL-MCNC: 0.77 MG/DL (ref 0.2–1)
BUN SERPL-MCNC: 10 MG/DL (ref 5–25)
CALCIUM ALBUM COR SERPL-MCNC: 9 MG/DL (ref 8.3–10.1)
CALCIUM SERPL-MCNC: 8.4 MG/DL (ref 8.4–10.2)
CHLORIDE SERPL-SCNC: 105 MMOL/L (ref 96–108)
CO2 SERPL-SCNC: 21 MMOL/L (ref 21–32)
CREAT SERPL-MCNC: 0.61 MG/DL (ref 0.6–1.3)
CREAT UR-MCNC: 222.2 MG/DL
ERYTHROCYTE [DISTWIDTH] IN BLOOD BY AUTOMATED COUNT: 12.8 % (ref 11.6–15.1)
GFR SERPL CREATININE-BSD FRML MDRD: 115 ML/MIN/1.73SQ M
GLUCOSE P FAST SERPL-MCNC: 69 MG/DL (ref 65–99)
HCT VFR BLD AUTO: 37.1 % (ref 34.8–46.1)
HGB BLD-MCNC: 12.4 G/DL (ref 11.5–15.4)
MCH RBC QN AUTO: 30.7 PG (ref 26.8–34.3)
MCHC RBC AUTO-ENTMCNC: 33.4 G/DL (ref 31.4–37.4)
MCV RBC AUTO: 92 FL (ref 82–98)
PLATELET # BLD AUTO: 244 THOUSANDS/UL (ref 149–390)
PMV BLD AUTO: 11.9 FL (ref 8.9–12.7)
POTASSIUM SERPL-SCNC: 4.2 MMOL/L (ref 3.5–5.3)
PROT SERPL-MCNC: 6 G/DL (ref 6.4–8.4)
PROT UR-MCNC: 24.4 MG/DL
PROT/CREAT UR: 0.1 MG/G{CREAT} (ref 0–0.1)
RBC # BLD AUTO: 4.04 MILLION/UL (ref 3.81–5.12)
SODIUM SERPL-SCNC: 134 MMOL/L (ref 135–147)
TREPONEMA PALLIDUM IGG+IGM AB [PRESENCE] IN SERUM OR PLASMA BY IMMUNOASSAY: NORMAL
WBC # BLD AUTO: 10.81 THOUSAND/UL (ref 4.31–10.16)

## 2024-10-09 PROCEDURE — 85027 COMPLETE CBC AUTOMATED: CPT

## 2024-10-09 PROCEDURE — 82570 ASSAY OF URINE CREATININE: CPT

## 2024-10-09 PROCEDURE — 84156 ASSAY OF PROTEIN URINE: CPT

## 2024-10-09 PROCEDURE — 87150 DNA/RNA AMPLIFIED PROBE: CPT | Performed by: OBSTETRICS & GYNECOLOGY

## 2024-10-09 PROCEDURE — 59025 FETAL NON-STRESS TEST: CPT | Performed by: OBSTETRICS & GYNECOLOGY

## 2024-10-09 PROCEDURE — PNV: Performed by: OBSTETRICS & GYNECOLOGY

## 2024-10-09 PROCEDURE — 86780 TREPONEMA PALLIDUM: CPT

## 2024-10-09 PROCEDURE — 80053 COMPREHEN METABOLIC PANEL: CPT

## 2024-10-09 PROCEDURE — 76815 OB US LIMITED FETUS(S): CPT | Performed by: OBSTETRICS & GYNECOLOGY

## 2024-10-09 PROCEDURE — 36415 COLL VENOUS BLD VENIPUNCTURE: CPT

## 2024-10-09 NOTE — ASSESSMENT & PLAN NOTE
NST reactive/reassuring  EMMANUEL with mild polyhydramnios, stable from previously  She has an additional NST appt with [facility] this week, advised to return to our office on Monday for one more NST

## 2024-10-09 NOTE — ASSESSMENT & PLAN NOTE
Discussed with patient trial of labor after  delivery (TOLAC).  Discussed likelihood of  based off  calculator.  Discussed that no patient can be guaranteed a .  And that a failed TOLAC is associated with higher maternal and  morbidity.  Benefits of  include lower rates of bleeding, VTE, infection and shorter recovery time compared to elective .  However, a failed TOLAC is associated with more complications.  Discussed risks associated with  including uterine rupture with rate quoted as 0.5-0.9%.  Most women with 1 previous  delivery with low transverse incision are candidates for TOLAC.  Evidence suggests that women with at least a 60-70% likelihood of achieving a  attempt TOLAC experience the same or less maternal morbidity than a woman who has an elective .  Predicted success rate of less than 70% is not a contraindication.  Induction and augmentation of labor is an option for TOLAC but there is a potential increased risk of uterine rupture.    Reviewed given new dx of gHTN delivery at 37 weeks is recommended  SVE is fingertip/long/high. GBS collected  Pt is not interested in IOL given unfavorable cervix and now desires repeat C/S, declines permanent sterilization  Scheduled for Tuesday 10/15 @ 10 am, given currently mildly elevated BP's with no signs/sx severe preeclampsia and limited ability to schedule nonemergent C/S over a weekend. Advised to obtain labwork today and if there were abnormalities delivery may indicated sooner than this. Advised to continue checking BP's at home, she denies any severe elevations  Chlorhexidine wash given, preop labs and instructions reviewed, discussed taking half of bedtime insulin night before   Strict precautions reviewed  She will f/u later this week with MFM for an NST and again on Monday prior to her scheduled C/S

## 2024-10-09 NOTE — PROGRESS NOTES
"    S: 39 y.o.  36w3d here for routine prenatal visit.        Chief Complaint   Patient presents with    Routine Prenatal Visit         OB complaints:  Contractions: irregular, mild discomfort   Leakage: no  Bleeding: no  Fetal movement: yes    MFM appt scheduled in 2 days   Last EMMANUEL was , 24.2   Last week was reporting BP's elevated at home but not in office   Last labs 10/3 were normal       O:  /90 (BP Location: Left arm, Patient Position: Sitting, Cuff Size: Adult)   Pulse 103   Ht 5' 4\" (1.626 m)   Wt 102 kg (224 lb 3.2 oz)   LMP 2023 (Exact Date)   SpO2 98%   BMI 38.48 kg/m²       Review of Systems   Constitutional:  Negative for chills and fever.   Eyes:  Negative for visual disturbance.   Respiratory:  Negative for chest tightness and shortness of breath.    Cardiovascular:  Negative for chest pain.   Gastrointestinal:  Negative for abdominal pain, diarrhea, nausea and vomiting.   Genitourinary:  Negative for pelvic pain and vaginal bleeding.        As noted in HPI   Skin:  Negative for rash.   Neurological:  Negative for headaches.   All other systems reviewed and are negative.        Physical Exam  Constitutional:       General: She is not in acute distress.     Appearance: Normal appearance.   Genitourinary:      Right Labia: No rash, tenderness, lesions or skin changes.     Left Labia: No tenderness, lesions, skin changes or rash.     Pelvic exam was performed with patient in the lithotomy position.   HENT:      Head: Normocephalic and atraumatic.   Cardiovascular:      Rate and Rhythm: Normal rate.   Pulmonary:      Effort: Pulmonary effort is normal. No respiratory distress.   Abdominal:      General: There is no distension.      Palpations: Abdomen is soft.      Tenderness: There is no abdominal tenderness. There is no guarding or rebound.   Neurological:      General: No focal deficit present.      Mental Status: She is alert.   Psychiatric:         Mood and Affect: Mood " normal.         Behavior: Behavior normal.   Vitals and nursing note reviewed. Exam conducted with a chaperone present.              Fetal Heart Rate: NST    Pregravid Weight/BMI: 96.6 kg (213 lb) (BMI 36.54)  Current Weight: 102 kg (224 lb 3.2 oz)   Total Weight Gain: 5.08 kg (11 lb 3.2 oz)     Pre- Vitals      Flowsheet Row Most Recent Value   Prenatal Assessment    Fetal Heart Rate NST   Movement Present   Presentation Vertex   Prenatal Vitals    Blood Pressure 136/90   Weight - Scale 102 kg (224 lb 3.2 oz)   Urine Albumin/Glucose    Dilation/Effacement/Station    Cervical Dilation .5   Cervical Effacement 30   Fetal Station -3   Vaginal Drainage    Edema               NST Completed today:  Baseline: 130 BPM  Variability: Moderate  Accelerations: Yes  Decelerations: None  Contractions: Not present  Nonstress Test Interpretation: Reactive    3rd TRIMESTER OBSTETRIC ULTRASOUND  10/9/2024  Paula Ballesteros MD    Darlin Mckinney is a  at 36w3d  INDICATION: GDMA2, obesity      TECHNIQUE:   Limited transabdominal imaging was performed   The study includes volumetric sweeps and traditional still imaging technique.      FINDINGS:  A single intrauterine gestation is identified.  Cardiac activity detected   Fetal presentation:  cephalic    Amniotic Fluid Index:    Q1: 6.26cm  Q2: 9.68cm  Q3: 5.16cm  Q4: 3.39cm    IMPRESSION:     Single intrauterine pregnancy at 36w3d  EMMANUEL 24.49cm, mild polyhydramnios           Problem List       Asthma    Overview     Albuteral inhaler PRN         Gastroesophageal reflux disease without esophagitis    Obesity (BMI 30-39.9)    Overview     Pre gestational BMI:  36  Early glucola ordered             Seasonal allergies    Moderate episode of recurrent major depressive disorder (HCC)    Anxiety    Overview     No meds X years         Carrier of hereditary disease    Overview     + 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 Presented in labor, NRFHT. Reports postop wound infection, no readmission, just managed with oral antibiotics and bedside drainage   ? Reaction to meds given w/csection:  rash on face   calculcator  67.8%  Discussed  risks including uterine rupture  Discussed if delivery indicated at 37 weeks due HTN that predicted chance of success with unfavorable cervix is less         Current Assessment & Plan     Discussed with patient trial of labor after  delivery (TOLAC).  Discussed likelihood of  based off  calculator.  Discussed that no patient can be guaranteed a .  And that a failed TOLAC is associated with higher maternal and  morbidity.  Benefits of  include lower rates of bleeding, VTE, infection and shorter recovery time compared to elective .  However, a failed TOLAC is associated with more complications.  Discussed risks associated with  including uterine rupture with rate quoted as 0.5-0.9%.  Most women with 1 previous  delivery with low transverse incision are candidates for TOLAC.  Evidence suggests that women with at least a 60-70% likelihood of achieving a  attempt TOLAC experience the same or less maternal morbidity than a woman who has an elective .  Predicted success rate of less than 70% is not a contraindication.  Induction and augmentation of labor is an option for TOLAC but there is a potential increased risk of uterine rupture.    Reviewed given new dx of gHTN delivery at 37 weeks is recommended  SVE is fingertip/long/high. GBS collected  Pt is not interested in IOL given unfavorable cervix and now desires repeat C/S, declines permanent sterilization  Scheduled for Tuesday 10/15 @ 10 am, given currently mildly elevated BP's with no signs/sx severe preeclampsia and limited  ability to schedule nonemergent C/S over a weekend. Advised to obtain labwork today and if there were abnormalities delivery may indicated sooner than this. Advised to continue checking BP's at home, she denies any severe elevations  Chlorhexidine wash given, preop labs and instructions reviewed, discussed taking half of bedtime insulin night before   Strict precautions reviewed  She will f/u later this week with [facility] for an NST and again on Monday prior to her scheduled C/S          Pregnancy resulting from in vitro fertilization in third trimester    Overview     IVF pregnancy with frozen embryo, +PGT  Fetal echo normal   Growth US at 32 weeks               Antepartum multigravida of advanced maternal age    36 weeks gestation of pregnancy    Obesity complicating pregnancy, childbirth, or puerperium, antepartum    Overview     -Pre-pregnancy weight 213 lbs. BMI 36.54.  -Current weight 218 lbs. BMI 37.56.  -Recommended weight gain 11 to 20 lbs.  Surveillance per GDMA2           Insulin controlled gestational diabetes mellitus (GDM) in third trimester    Overview     Elevated early glucola,   3 hour glucola elevated at 19 weeks          Current Assessment & Plan     NST reactive/reassuring  EMMANUEL with mild polyhydramnios, stable from previously  She has an additional NST appt with [facility] this week, advised to return to our office on Monday for one more NST         Supervision of high risk pregnancy in third trimester    Overview     Normal PGT-A, additional aneuploidy screening deferred   AFP low risk   Declined flu, tdap, RSV vaccines          Gestational hypertension, third trimester    Overview     Elevated BP in triage 9/23, elevations at home, elevated in office 10/9          Placenta succenturiata, third trimester    Overview     May increase risk for retained portion of placenta after delivery.          Polyhydramnios in third trimester                         Paula Ballesteros MD  10/9/2024  10:13 AM

## 2024-10-09 NOTE — PATIENT INSTRUCTIONS
C section scheduled for Tuesday 10/15 at 10 am   Take 1/2 dose of nighttime insulin before your surgery     Admission Instructions for [facility]     Arriving at [facility] the day of your scheduled   Arrive 2 hours before your scheduled surgery time  Report to Admissions. You will be directed to the [facility] Birthing Unit from there. For those reporting to the hospital before 6am, you may need to enter through the ER entrance.   Dietary Restrictions (It is vitally important that you follow these instructions exactly)  Scheduled for morning surgery; DO NOT eat or drink anything after midnight, not even WATER! You may have a light dinner early in the evening up to 9pm and then liquids until midnight.   Scheduled for afternoon surgery; Clear liquids between midnight and 8am. DO NOT EAT ANYTHING AFTER MIDNIGHT. (Clear liquids consist of Sprite, Ginger Ale, Tea, Black Coffee, Jello, Broth, Apple Juice… You may use sugar. NO milk products or Orange Juice. No chewing gum or candy.   Failure to follow these instructions could result in a delay or cancelation of your surgery.   Do not smoke or drink alcohol for 24 hours before surgery  Your Obstetrician will tell you what medicines to take or not take on the day of your surgery.                  Preparation     When you arrive on the Labor floor, a member of our staff will prepare you for the birth of your baby.   Preparations may include:  Completing of admission information  Taking your vital signs  Listening to your baby's heartbeat  Clipping the hair on your lower abdomen and upper pubic area  Inserting an intravenous (IV) line  Drawing blood for lab work  A visit from your anesthesiologist  Performing an ultrasound to check your baby's position  Antibiotics may be given an hour before surgery or right after it starts. Antibiotics help fight or prevent a bacterial infection. You may need tests for certain infections that can be  passed to your baby, such as group B strep (GBS). If you are a GBS carrier or are at increased risk, antibiotics help prevent your baby from being infected during the .              In The Operating Room     You will walk back to the OR with your nurse and support person. Your support person will wait outside of the OR for now. The OR team will have you sit on the operating table and position you for your spinal anesthesia. After your spinal has been successfully administered, you will lie down on the table on your back with the assistance of the OR team.      You will be covered with surgical drapes and will have a heart rate and blood pressure monitor connected to you. There will be several nurses and doctors in the room to care for you and your baby. The room is usually cool; you may hear sounds such as heart monitors and suction. There will be a warm bed prepared for your baby. Your support person will be brought into the room once the drapes are in place and your doctor is ready to start your surgery. If you are required to have general anesthesia, your support person will not be able to be in the OR.               Recovery     You will spend about two hours after the surgery recovering in your postpartum room. The nurse caring for you will check your vital signs and incision often during this time. The nurse will communicate with your anesthesiologist about your need for pain medication. This is the room where you will stay for the remainder of your time in the hospital.   After your baby is born, your support person can accompany your baby and your baby's nurse to the  nursery. After you are finished in the OR and brought to your postpartum room, your support person can rejoin you there.      After the birth, as long as your baby is healthy, you and your support person can see and touch the baby in the OR before he/she is taken to the nursery. When you are in your postpartum room and you are  feeling well enough, you may hold your baby. You may also do Skin to Skin contact time and breastfeed if that is what you desire. We encourage you to breastfeed as soon as you feel comfortable, as your baby is usually awake and interested in feeding at this time. Your nurse will help you get started.   Do not get out of bed until your healthcare provider says it is okay.    Call for a healthcare provider if you are holding your baby and start to feel tired. The provider can put him or her in a bassinet near you while you rest or sleep. This will help prevent an accidental drop or fall of your baby.

## 2024-10-10 ENCOUNTER — LAB REQUISITION (OUTPATIENT)
Dept: LAB | Facility: HOSPITAL | Age: 39
End: 2024-10-10
Payer: COMMERCIAL

## 2024-10-10 DIAGNOSIS — O34.211 MATERNAL CARE FOR LOW TRANSVERSE SCAR FROM PREVIOUS CESAREAN DELIVERY: ICD-10-CM

## 2024-10-10 DIAGNOSIS — O13.3 GESTATIONAL (PREGNANCY-INDUCED) HYPERTENSION WITHOUT SIGNIFICANT PROTEINURIA, THIRD TRIMESTER: ICD-10-CM

## 2024-10-10 LAB
ABO GROUP BLD: NORMAL
BLD GP AB SCN SERPL QL: NEGATIVE
RH BLD: POSITIVE
SPECIMEN EXPIRATION DATE: NORMAL

## 2024-10-10 PROCEDURE — 86901 BLOOD TYPING SEROLOGIC RH(D): CPT | Performed by: OBSTETRICS & GYNECOLOGY

## 2024-10-10 PROCEDURE — 86900 BLOOD TYPING SEROLOGIC ABO: CPT | Performed by: OBSTETRICS & GYNECOLOGY

## 2024-10-10 PROCEDURE — 86850 RBC ANTIBODY SCREEN: CPT | Performed by: OBSTETRICS & GYNECOLOGY

## 2024-10-11 ENCOUNTER — ROUTINE PRENATAL (OUTPATIENT)
Dept: PERINATAL CARE | Facility: OTHER | Age: 39
End: 2024-10-11
Payer: COMMERCIAL

## 2024-10-11 VITALS
HEIGHT: 64 IN | DIASTOLIC BLOOD PRESSURE: 88 MMHG | WEIGHT: 226.2 LBS | BODY MASS INDEX: 38.62 KG/M2 | SYSTOLIC BLOOD PRESSURE: 130 MMHG | HEART RATE: 100 BPM

## 2024-10-11 DIAGNOSIS — O13.3 GESTATIONAL HYPERTENSION, THIRD TRIMESTER: ICD-10-CM

## 2024-10-11 DIAGNOSIS — O24.414 INSULIN CONTROLLED GESTATIONAL DIABETES MELLITUS (GDM) IN THIRD TRIMESTER: ICD-10-CM

## 2024-10-11 DIAGNOSIS — Z3A.36 36 WEEKS GESTATION OF PREGNANCY: Primary | ICD-10-CM

## 2024-10-11 LAB — GP B STREP DNA SPEC QL NAA+PROBE: NEGATIVE

## 2024-10-11 PROCEDURE — 59025 FETAL NON-STRESS TEST: CPT | Performed by: OBSTETRICS & GYNECOLOGY

## 2024-10-11 NOTE — PROGRESS NOTES
Repeat Non-Stress Testing:    Patient verbalizes +FM. Pt denies ALL:               Leaking of fluid   Contractions   Vaginal bleeding   Decreased fetal movement    Patient is performing daily kick counts. Patient has no questions or concerns.   NST strip reviewed by Dr. Box.    BP's discussed with dr. Box

## 2024-10-11 NOTE — PROGRESS NOTES
[facility] test at 36-5/7 weeks.    Indication A2 GDM gestational hypertension.    Reactive.    Plan.    Twice a week nonstress test once a week EMMANUEL daily fetal movement counts.    Hernan Box MD, MSCE    Maternal-fetal medicine

## 2024-10-13 ENCOUNTER — NURSE TRIAGE (OUTPATIENT)
Dept: OTHER | Facility: OTHER | Age: 39
End: 2024-10-13

## 2024-10-13 NOTE — TELEPHONE ENCOUNTER
Patient was told at last visit on 10/9 to have certain labs drawn last week and others to wait for 2 days prior to  on 10/15. Patient was at the lab now but all labs were drawn last week. Discussed with on call provider Dr. Gamez stated no need for re-draw. Called patient and made aware.       Please call patient tomorrow to confirm that no further labs need to be drawn or re-drawn. Thank you.

## 2024-10-13 NOTE — TELEPHONE ENCOUNTER
"Regarding: Lab Work  ----- Message from Steffanie LEDESMA sent at 10/13/2024  9:05 AM EDT -----  \"I am here to get labwork but I am not sure which labwork I am suppose to get. They say they dont see anything on their side and I have a procedure on Tuesday, so I want to make  sure everything gets done\"    "

## 2024-10-14 ENCOUNTER — ROUTINE PRENATAL (OUTPATIENT)
Dept: OBGYN CLINIC | Facility: CLINIC | Age: 39
End: 2024-10-14
Payer: COMMERCIAL

## 2024-10-14 VITALS
DIASTOLIC BLOOD PRESSURE: 90 MMHG | WEIGHT: 230.2 LBS | SYSTOLIC BLOOD PRESSURE: 140 MMHG | BODY MASS INDEX: 39.3 KG/M2 | HEIGHT: 64 IN

## 2024-10-14 DIAGNOSIS — Z3A.37 37 WEEKS GESTATION OF PREGNANCY: ICD-10-CM

## 2024-10-14 DIAGNOSIS — O13.3 GESTATIONAL HYPERTENSION, THIRD TRIMESTER: Primary | ICD-10-CM

## 2024-10-14 DIAGNOSIS — O34.211 MATERNAL CARE DUE TO LOW TRANSVERSE UTERINE SCAR FROM PREVIOUS CESAREAN DELIVERY: ICD-10-CM

## 2024-10-14 DIAGNOSIS — O09.93 SUPERVISION OF HIGH RISK PREGNANCY IN THIRD TRIMESTER: ICD-10-CM

## 2024-10-14 DIAGNOSIS — O24.414 INSULIN CONTROLLED GESTATIONAL DIABETES MELLITUS (GDM) IN THIRD TRIMESTER: ICD-10-CM

## 2024-10-14 DIAGNOSIS — O99.210 OBESITY AFFECTING PREGNANCY, ANTEPARTUM, UNSPECIFIED OBESITY TYPE: ICD-10-CM

## 2024-10-14 PROBLEM — E66.9 OBESITY (BMI 30-39.9): Status: RESOLVED | Noted: 2023-05-15 | Resolved: 2024-10-14

## 2024-10-14 PROCEDURE — PNV: Performed by: OBSTETRICS & GYNECOLOGY

## 2024-10-14 PROCEDURE — 59025 FETAL NON-STRESS TEST: CPT | Performed by: OBSTETRICS & GYNECOLOGY

## 2024-10-14 NOTE — PROGRESS NOTES
"    S: 39 y.o.  37w1d here for routine prenatal visit.        Chief Complaint   Patient presents with    Routine Prenatal Visit         OB complaints:  Contractions: no  Leakage: no  Bleeding: no  Fetal movement: yes      O:  /90 (BP Location: Right arm, Patient Position: Sitting, Cuff Size: Adult)   Ht 5' 4\" (1.626 m)   Wt 104 kg (230 lb 3.2 oz)   LMP 2023 (Exact Date)   BMI 39.51 kg/m²       Review of Systems   Constitutional:  Negative for chills and fever.   Eyes:  Negative for visual disturbance.   Respiratory:  Negative for chest tightness and shortness of breath.    Cardiovascular:  Negative for chest pain.   Gastrointestinal:  Negative for abdominal pain, diarrhea, nausea and vomiting.   Genitourinary:  Negative for pelvic pain and vaginal bleeding.        As noted in HPI   Skin:  Negative for rash.   Neurological:  Negative for headaches.   All other systems reviewed and are negative.        Physical Exam  Constitutional:       General: She is not in acute distress.     Appearance: Normal appearance.   HENT:      Head: Normocephalic and atraumatic.   Cardiovascular:      Rate and Rhythm: Normal rate.   Pulmonary:      Effort: Pulmonary effort is normal. No respiratory distress.   Abdominal:      General: There is no distension.      Palpations: Abdomen is soft.      Tenderness: There is no abdominal tenderness. There is no guarding or rebound.      Comments: gravid   Neurological:      General: No focal deficit present.      Mental Status: She is alert.   Psychiatric:         Mood and Affect: Mood normal.         Behavior: Behavior normal.   Vitals and nursing note reviewed.              Fetal Heart Rate: 135 NST    Pregravid Weight/BMI: 96.6 kg (213 lb) (BMI 36.54)  Current Weight: 104 kg (230 lb 3.2 oz)   Total Weight Gain: 7.802 kg (17 lb 3.2 oz)     Pre- Vitals      Flowsheet Row Most Recent Value   Prenatal Assessment    Fetal Heart Rate 135 NST   Movement Present   Prenatal " Vitals    Blood Pressure 140/90   Weight - Scale 104 kg (230 lb 3.2 oz)   Urine Albumin/Glucose    Dilation/Effacement/Station    Vaginal Drainage    Edema                 NST Completed today:  Baseline: 135 BPM  Variability: Moderate  Accelerations: Yes  Decelerations: None  Contractions: Not present  Nonstress Test Interpretation: Reactive        Problem List       Asthma    Overview     Albuteral inhaler PRN         Gastroesophageal reflux disease without esophagitis    Seasonal allergies    Moderate episode of recurrent major depressive disorder (HCC)    Anxiety    Overview     No meds X years         Carrier of hereditary disease    Overview     + 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 Presented in labor, NRFHT. Reports postop wound infection, no readmission, just managed with oral antibiotics and bedside drainage   ? Reaction to meds given w/csection:  rash on face  Plans repeat C/S          Pregnancy resulting from in vitro fertilization in third trimester    Overview     IVF pregnancy with frozen embryo, +PGT  Fetal echo normal   Growth US at 32 weeks               Antepartum multigravida of advanced maternal age    37 weeks gestation of pregnancy    Obesity complicating pregnancy, childbirth, or puerperium, antepartum    Overview     -Pre-pregnancy weight 213 lbs. BMI 36.54.  -Current weight 218 lbs. BMI 37.56.  -Recommended weight gain 11 to 20 lbs.  Surveillance per GDMA2           Insulin controlled gestational diabetes mellitus (GDM) in third trimester    Overview     Elevated early glucola,   3 hour glucola elevated at 19 weeks          Supervision of high risk pregnancy in third trimester    Overview     Normal PGT-A, additional aneuploidy screening deferred   AFP low risk   Declined  flu, tdap, RSV vaccines          Gestational hypertension, third trimester    Overview     Elevated BP in triage 9/23, elevations at home, elevated in office 10/9          Current Assessment & Plan     Recent labwork without evidence of severe preeclampsia  No current sx concerning for severe preeclampsia, BP mildly elevated c/w known gHTN  She is scheduled for repeat C/S tomorrow, no sterilization, instructions reviewed  NST today reactive/reassuring  Precautions reviewed         Placenta succenturiata, third trimester    Overview     May increase risk for retained portion of placenta after delivery.          Polyhydramnios in third trimester                         Paula Ballesteros MD  10/14/2024  2:20 PM

## 2024-10-14 NOTE — ASSESSMENT & PLAN NOTE
Recent labwork without evidence of severe preeclampsia  No current sx concerning for severe preeclampsia, BP mildly elevated c/w known gHTN  She is scheduled for repeat C/S tomorrow, no sterilization, instructions reviewed  NST today reactive/reassuring  Precautions reviewed

## 2024-10-15 ENCOUNTER — HOSPITAL ENCOUNTER (INPATIENT)
Facility: HOSPITAL | Age: 39
LOS: 3 days | Discharge: HOME | End: 2024-10-18
Attending: OBSTETRICS & GYNECOLOGY | Admitting: OBSTETRICS & GYNECOLOGY
Payer: COMMERCIAL

## 2024-10-15 ENCOUNTER — ANESTHESIA (INPATIENT)
Dept: LABOR AND DELIVERY | Facility: HOSPITAL | Age: 39
End: 2024-10-15
Payer: COMMERCIAL

## 2024-10-15 ENCOUNTER — ANESTHESIA EVENT (INPATIENT)
Dept: LABOR AND DELIVERY | Facility: HOSPITAL | Age: 39
End: 2024-10-15
Payer: COMMERCIAL

## 2024-10-15 DIAGNOSIS — O13.3 GESTATIONAL HYPERTENSION, THIRD TRIMESTER: ICD-10-CM

## 2024-10-15 DIAGNOSIS — Z98.891 S/P CESAREAN SECTION: Primary | ICD-10-CM

## 2024-10-15 LAB
ABO GROUP BLD: NORMAL
ALBUMIN SERPL BCG-MCNC: 3.1 G/DL (ref 3.5–5)
ALP SERPL-CCNC: 188 U/L (ref 34–104)
ALT SERPL W P-5'-P-CCNC: 11 U/L (ref 7–52)
ANION GAP SERPL CALCULATED.3IONS-SCNC: 3 MMOL/L (ref 4–13)
ANION GAP SERPL CALCULATED.3IONS-SCNC: 7 MMOL/L (ref 4–13)
AST SERPL W P-5'-P-CCNC: 17 U/L (ref 13–39)
BASE EXCESS BLDCOA CALC-SCNC: -4.9 MMOL/L (ref 3–11)
BASE EXCESS BLDCOV CALC-SCNC: -6.1 MMOL/L (ref 1–9)
BASOPHILS # BLD AUTO: 0.03 THOUSANDS/ΜL (ref 0–0.1)
BASOPHILS NFR BLD AUTO: 0 % (ref 0–1)
BILIRUB SERPL-MCNC: 0.87 MG/DL (ref 0.2–1)
BLD GP AB SCN SERPL QL: NEGATIVE
BUN SERPL-MCNC: 11 MG/DL (ref 5–25)
BUN SERPL-MCNC: 12 MG/DL (ref 5–25)
CALCIUM ALBUM COR SERPL-MCNC: 9.2 MG/DL (ref 8.3–10.1)
CALCIUM SERPL-MCNC: 7.7 MG/DL (ref 8.4–10.2)
CALCIUM SERPL-MCNC: 8.5 MG/DL (ref 8.4–10.2)
CHLORIDE SERPL-SCNC: 105 MMOL/L (ref 96–108)
CHLORIDE SERPL-SCNC: 108 MMOL/L (ref 96–108)
CO2 SERPL-SCNC: 20 MMOL/L (ref 21–32)
CO2 SERPL-SCNC: 22 MMOL/L (ref 21–32)
CREAT SERPL-MCNC: 0.66 MG/DL (ref 0.6–1.3)
CREAT SERPL-MCNC: 0.71 MG/DL (ref 0.6–1.3)
EOSINOPHIL # BLD AUTO: 0.04 THOUSAND/ΜL (ref 0–0.61)
EOSINOPHIL NFR BLD AUTO: 0 % (ref 0–6)
ERYTHROCYTE [DISTWIDTH] IN BLOOD BY AUTOMATED COUNT: 12.6 % (ref 11.6–15.1)
ERYTHROCYTE [DISTWIDTH] IN BLOOD BY AUTOMATED COUNT: 12.7 % (ref 11.6–15.1)
GFR SERPL CREATININE-BSD FRML MDRD: 108 ML/MIN/1.73SQ M
GFR SERPL CREATININE-BSD FRML MDRD: 112 ML/MIN/1.73SQ M
GLUCOSE P FAST SERPL-MCNC: 64 MG/DL (ref 65–99)
GLUCOSE SERPL-MCNC: 110 MG/DL (ref 65–140)
GLUCOSE SERPL-MCNC: 57 MG/DL (ref 65–140)
GLUCOSE SERPL-MCNC: 59 MG/DL (ref 65–140)
GLUCOSE SERPL-MCNC: 64 MG/DL (ref 65–140)
GLUCOSE SERPL-MCNC: 91 MG/DL (ref 65–140)
HCO3 BLDCOA-SCNC: 23.3 MMOL/L (ref 17.3–27.3)
HCO3 BLDCOV-SCNC: 20.1 MMOL/L (ref 12.2–28.6)
HCT VFR BLD AUTO: 30 % (ref 34.8–46.1)
HCT VFR BLD AUTO: 35 % (ref 34.8–46.1)
HGB BLD-MCNC: 10.3 G/DL (ref 11.5–15.4)
HGB BLD-MCNC: 11.8 G/DL (ref 11.5–15.4)
IMM GRANULOCYTES # BLD AUTO: 0.03 THOUSAND/UL (ref 0–0.2)
IMM GRANULOCYTES NFR BLD AUTO: 0 % (ref 0–2)
LYMPHOCYTES # BLD AUTO: 1.98 THOUSANDS/ΜL (ref 0.6–4.47)
LYMPHOCYTES NFR BLD AUTO: 20 % (ref 14–44)
MCH RBC QN AUTO: 30 PG (ref 26.8–34.3)
MCH RBC QN AUTO: 30.5 PG (ref 26.8–34.3)
MCHC RBC AUTO-ENTMCNC: 33.7 G/DL (ref 31.4–37.4)
MCHC RBC AUTO-ENTMCNC: 34.3 G/DL (ref 31.4–37.4)
MCV RBC AUTO: 89 FL (ref 82–98)
MCV RBC AUTO: 89 FL (ref 82–98)
MONOCYTES # BLD AUTO: 0.42 THOUSAND/ΜL (ref 0.17–1.22)
MONOCYTES NFR BLD AUTO: 4 % (ref 4–12)
NEUTROPHILS # BLD AUTO: 7.57 THOUSANDS/ΜL (ref 1.85–7.62)
NEUTS SEG NFR BLD AUTO: 76 % (ref 43–75)
NRBC BLD AUTO-RTO: 0 /100 WBCS
O2 CT VFR BLDCOA CALC: 8.7 ML/DL
OXYHGB MFR BLDCOA: 44.5 %
OXYHGB MFR BLDCOV: 47.4 %
PCO2 BLDCOA: 56.2 MM[HG] (ref 30–60)
PCO2 BLDCOV: 42.2 MM HG (ref 27–43)
PH BLDCOA: 7.24 [PH] (ref 7.23–7.43)
PH BLDCOV: 7.3 [PH] (ref 7.19–7.49)
PLATELET # BLD AUTO: 190 THOUSANDS/UL (ref 149–390)
PLATELET # BLD AUTO: 205 THOUSANDS/UL (ref 149–390)
PMV BLD AUTO: 10.8 FL (ref 8.9–12.7)
PMV BLD AUTO: 11.2 FL (ref 8.9–12.7)
PO2 BLDCOA: 22.9 MM HG (ref 5–25)
PO2 BLDCOV: 22.4 MM HG (ref 15–45)
POTASSIUM SERPL-SCNC: 3.8 MMOL/L (ref 3.5–5.3)
POTASSIUM SERPL-SCNC: 3.9 MMOL/L (ref 3.5–5.3)
PROT SERPL-MCNC: 5.9 G/DL (ref 6.4–8.4)
RBC # BLD AUTO: 3.38 MILLION/UL (ref 3.81–5.12)
RBC # BLD AUTO: 3.93 MILLION/UL (ref 3.81–5.12)
RH BLD: POSITIVE
SAO2 % BLDCOV: 8.6 ML/DL
SODIUM SERPL-SCNC: 130 MMOL/L (ref 135–147)
SODIUM SERPL-SCNC: 135 MMOL/L (ref 135–147)
SPECIMEN EXPIRATION DATE: NORMAL
TREPONEMA PALLIDUM IGG+IGM AB [PRESENCE] IN SERUM OR PLASMA BY IMMUNOASSAY: NORMAL
WBC # BLD AUTO: 10.07 THOUSAND/UL (ref 4.31–10.16)
WBC # BLD AUTO: 11.76 THOUSAND/UL (ref 4.31–10.16)

## 2024-10-15 PROCEDURE — 80053 COMPREHEN METABOLIC PANEL: CPT | Performed by: OBSTETRICS & GYNECOLOGY

## 2024-10-15 PROCEDURE — 85025 COMPLETE CBC W/AUTO DIFF WBC: CPT | Performed by: OBSTETRICS & GYNECOLOGY

## 2024-10-15 PROCEDURE — 86900 BLOOD TYPING SEROLOGIC ABO: CPT | Performed by: OBSTETRICS & GYNECOLOGY

## 2024-10-15 PROCEDURE — 85027 COMPLETE CBC AUTOMATED: CPT

## 2024-10-15 PROCEDURE — 88307 TISSUE EXAM BY PATHOLOGIST: CPT | Performed by: SPECIALIST

## 2024-10-15 PROCEDURE — 86850 RBC ANTIBODY SCREEN: CPT | Performed by: OBSTETRICS & GYNECOLOGY

## 2024-10-15 PROCEDURE — 80048 BASIC METABOLIC PNL TOTAL CA: CPT

## 2024-10-15 PROCEDURE — 94762 N-INVAS EAR/PLS OXIMTRY CONT: CPT

## 2024-10-15 PROCEDURE — 59510 CESAREAN DELIVERY: CPT | Performed by: OBSTETRICS & GYNECOLOGY

## 2024-10-15 PROCEDURE — 86780 TREPONEMA PALLIDUM: CPT | Performed by: OBSTETRICS & GYNECOLOGY

## 2024-10-15 PROCEDURE — 82805 BLOOD GASES W/O2 SATURATION: CPT | Performed by: OBSTETRICS & GYNECOLOGY

## 2024-10-15 PROCEDURE — 82948 REAGENT STRIP/BLOOD GLUCOSE: CPT

## 2024-10-15 PROCEDURE — 4A1HXCZ MONITORING OF PRODUCTS OF CONCEPTION, CARDIAC RATE, EXTERNAL APPROACH: ICD-10-PCS | Performed by: OBSTETRICS & GYNECOLOGY

## 2024-10-15 PROCEDURE — 86901 BLOOD TYPING SEROLOGIC RH(D): CPT | Performed by: OBSTETRICS & GYNECOLOGY

## 2024-10-15 PROCEDURE — NC001 PR NO CHARGE: Performed by: OBSTETRICS & GYNECOLOGY

## 2024-10-15 RX ORDER — ACETAMINOPHEN 325 MG/1
975 TABLET ORAL EVERY 6 HOURS SCHEDULED
Status: DISPENSED | OUTPATIENT
Start: 2024-10-15 | End: 2024-10-16

## 2024-10-15 RX ORDER — OXYCODONE HYDROCHLORIDE 5 MG/1
5 TABLET ORAL EVERY 4 HOURS PRN
Status: ACTIVE | OUTPATIENT
Start: 2024-10-15 | End: 2024-10-16

## 2024-10-15 RX ORDER — OXYCODONE HYDROCHLORIDE 5 MG/1
10 TABLET ORAL EVERY 4 HOURS PRN
Status: DISCONTINUED | OUTPATIENT
Start: 2024-10-16 | End: 2024-10-18 | Stop reason: HOSPADM

## 2024-10-15 RX ORDER — ACETAMINOPHEN 325 MG/1
650 TABLET ORAL EVERY 6 HOURS SCHEDULED
Status: DISCONTINUED | OUTPATIENT
Start: 2024-10-16 | End: 2024-10-18 | Stop reason: HOSPADM

## 2024-10-15 RX ORDER — KETOROLAC TROMETHAMINE 30 MG/ML
30 INJECTION, SOLUTION INTRAMUSCULAR; INTRAVENOUS EVERY 6 HOURS
Status: ACTIVE | OUTPATIENT
Start: 2024-10-15 | End: 2024-10-16

## 2024-10-15 RX ORDER — ONDANSETRON 2 MG/ML
INJECTION INTRAMUSCULAR; INTRAVENOUS
Status: COMPLETED
Start: 2024-10-15 | End: 2024-10-15

## 2024-10-15 RX ORDER — SODIUM CHLORIDE 9 MG/ML
125 INJECTION, SOLUTION INTRAVENOUS CONTINUOUS
Status: DISCONTINUED | OUTPATIENT
Start: 2024-10-15 | End: 2024-10-15

## 2024-10-15 RX ORDER — MORPHINE SULFATE 0.5 MG/ML
INJECTION, SOLUTION EPIDURAL; INTRATHECAL; INTRAVENOUS AS NEEDED
Status: DISCONTINUED | OUTPATIENT
Start: 2024-10-15 | End: 2024-10-15

## 2024-10-15 RX ORDER — ALBUTEROL SULFATE 90 UG/1
2 INHALANT RESPIRATORY (INHALATION) EVERY 6 HOURS PRN
Status: DISCONTINUED | OUTPATIENT
Start: 2024-10-15 | End: 2024-10-18 | Stop reason: HOSPADM

## 2024-10-15 RX ORDER — SIMETHICONE 80 MG
80 TABLET,CHEWABLE ORAL 4 TIMES DAILY PRN
Status: DISCONTINUED | OUTPATIENT
Start: 2024-10-15 | End: 2024-10-18 | Stop reason: HOSPADM

## 2024-10-15 RX ORDER — ONDANSETRON 2 MG/ML
4 INJECTION INTRAMUSCULAR; INTRAVENOUS EVERY 8 HOURS PRN
Status: DISCONTINUED | OUTPATIENT
Start: 2024-10-16 | End: 2024-10-18 | Stop reason: HOSPADM

## 2024-10-15 RX ORDER — IBUPROFEN 600 MG/1
600 TABLET, FILM COATED ORAL EVERY 6 HOURS
Status: DISCONTINUED | OUTPATIENT
Start: 2024-10-18 | End: 2024-10-17

## 2024-10-15 RX ORDER — SODIUM CHLORIDE, SODIUM LACTATE, POTASSIUM CHLORIDE, CALCIUM CHLORIDE 600; 310; 30; 20 MG/100ML; MG/100ML; MG/100ML; MG/100ML
100 INJECTION, SOLUTION INTRAVENOUS CONTINUOUS
Status: DISCONTINUED | OUTPATIENT
Start: 2024-10-15 | End: 2024-10-18 | Stop reason: HOSPADM

## 2024-10-15 RX ORDER — CEFAZOLIN SODIUM 2 G/50ML
2000 SOLUTION INTRAVENOUS ONCE
Status: COMPLETED | OUTPATIENT
Start: 2024-10-15 | End: 2024-10-15

## 2024-10-15 RX ORDER — KETOROLAC TROMETHAMINE 30 MG/ML
15 INJECTION, SOLUTION INTRAMUSCULAR; INTRAVENOUS EVERY 6 HOURS SCHEDULED
Status: DISCONTINUED | OUTPATIENT
Start: 2024-10-16 | End: 2024-10-17

## 2024-10-15 RX ORDER — ENOXAPARIN SODIUM 100 MG/ML
40 INJECTION SUBCUTANEOUS DAILY
Status: DISCONTINUED | OUTPATIENT
Start: 2024-10-16 | End: 2024-10-18 | Stop reason: HOSPADM

## 2024-10-15 RX ORDER — HYDROMORPHONE HCL/PF 1 MG/ML
0.5 SYRINGE (ML) INJECTION
Status: ACTIVE | OUTPATIENT
Start: 2024-10-15 | End: 2024-10-16

## 2024-10-15 RX ORDER — METRONIDAZOLE 500 MG/1
500 TABLET ORAL EVERY 8 HOURS SCHEDULED
Status: COMPLETED | OUTPATIENT
Start: 2024-10-15 | End: 2024-10-17

## 2024-10-15 RX ORDER — OXYTOCIN/RINGER'S LACTATE 30/500 ML
PLASTIC BAG, INJECTION (ML) INTRAVENOUS CONTINUOUS PRN
Status: DISCONTINUED | OUTPATIENT
Start: 2024-10-15 | End: 2024-10-15

## 2024-10-15 RX ORDER — CALCIUM CARBONATE 500 MG/1
1000 TABLET, CHEWABLE ORAL DAILY PRN
Status: DISCONTINUED | OUTPATIENT
Start: 2024-10-15 | End: 2024-10-18 | Stop reason: HOSPADM

## 2024-10-15 RX ORDER — SODIUM CHLORIDE, SODIUM LACTATE, POTASSIUM CHLORIDE, CALCIUM CHLORIDE 600; 310; 30; 20 MG/100ML; MG/100ML; MG/100ML; MG/100ML
125 INJECTION, SOLUTION INTRAVENOUS CONTINUOUS
Status: DISCONTINUED | OUTPATIENT
Start: 2024-10-15 | End: 2024-10-15

## 2024-10-15 RX ORDER — MORPHINE SULFATE 0.5 MG/ML
INJECTION, SOLUTION EPIDURAL; INTRATHECAL; INTRAVENOUS
Status: COMPLETED
Start: 2024-10-15 | End: 2024-10-15

## 2024-10-15 RX ORDER — NIFEDIPINE 30 MG/1
30 TABLET, EXTENDED RELEASE ORAL DAILY
Status: DISCONTINUED | OUTPATIENT
Start: 2024-10-15 | End: 2024-10-16

## 2024-10-15 RX ORDER — METOCLOPRAMIDE HYDROCHLORIDE 5 MG/ML
10 INJECTION INTRAMUSCULAR; INTRAVENOUS EVERY 6 HOURS PRN
Status: DISPENSED | OUTPATIENT
Start: 2024-10-15 | End: 2024-10-16

## 2024-10-15 RX ORDER — KETOROLAC TROMETHAMINE 30 MG/ML
INJECTION, SOLUTION INTRAMUSCULAR; INTRAVENOUS
Status: COMPLETED
Start: 2024-10-15 | End: 2024-10-15

## 2024-10-15 RX ORDER — DIPHENHYDRAMINE HCL 25 MG
25 TABLET ORAL EVERY 6 HOURS PRN
Status: DISCONTINUED | OUTPATIENT
Start: 2024-10-16 | End: 2024-10-17

## 2024-10-15 RX ORDER — SODIUM CHLORIDE, SODIUM LACTATE, POTASSIUM CHLORIDE, CALCIUM CHLORIDE 600; 310; 30; 20 MG/100ML; MG/100ML; MG/100ML; MG/100ML
INJECTION, SOLUTION INTRAVENOUS CONTINUOUS PRN
Status: DISCONTINUED | OUTPATIENT
Start: 2024-10-15 | End: 2024-10-15

## 2024-10-15 RX ORDER — BUPIVACAINE HYDROCHLORIDE 7.5 MG/ML
INJECTION, SOLUTION INTRASPINAL AS NEEDED
Status: DISCONTINUED | OUTPATIENT
Start: 2024-10-15 | End: 2024-10-15

## 2024-10-15 RX ORDER — DEXTROSE MONOHYDRATE 25 G/50ML
25 INJECTION, SOLUTION INTRAVENOUS ONCE AS NEEDED
Status: DISCONTINUED | OUTPATIENT
Start: 2024-10-15 | End: 2024-10-18 | Stop reason: HOSPADM

## 2024-10-15 RX ORDER — NALOXONE HYDROCHLORIDE 0.4 MG/ML
0.1 INJECTION, SOLUTION INTRAMUSCULAR; INTRAVENOUS; SUBCUTANEOUS
Status: ACTIVE | OUTPATIENT
Start: 2024-10-15 | End: 2024-10-16

## 2024-10-15 RX ORDER — OXYTOCIN/RINGER'S LACTATE 30/500 ML
PLASTIC BAG, INJECTION (ML) INTRAVENOUS
Status: DISPENSED
Start: 2024-10-15 | End: 2024-10-15

## 2024-10-15 RX ORDER — OXYTOCIN/RINGER'S LACTATE 30/500 ML
62.5 PLASTIC BAG, INJECTION (ML) INTRAVENOUS ONCE
Status: COMPLETED | OUTPATIENT
Start: 2024-10-15 | End: 2024-10-15

## 2024-10-15 RX ORDER — OXYTOCIN/RINGER'S LACTATE 30/500 ML
PLASTIC BAG, INJECTION (ML) INTRAVENOUS
Status: COMPLETED
Start: 2024-10-15 | End: 2024-10-15

## 2024-10-15 RX ORDER — ONDANSETRON 2 MG/ML
4 INJECTION INTRAMUSCULAR; INTRAVENOUS EVERY 6 HOURS PRN
Status: DISPENSED | OUTPATIENT
Start: 2024-10-15 | End: 2024-10-16

## 2024-10-15 RX ORDER — OXYCODONE HYDROCHLORIDE 5 MG/1
5 TABLET ORAL EVERY 4 HOURS PRN
Status: DISCONTINUED | OUTPATIENT
Start: 2024-10-16 | End: 2024-10-18 | Stop reason: HOSPADM

## 2024-10-15 RX ORDER — FENTANYL CITRATE 50 UG/ML
INJECTION, SOLUTION INTRAMUSCULAR; INTRAVENOUS
Status: COMPLETED
Start: 2024-10-15 | End: 2024-10-15

## 2024-10-15 RX ORDER — DEXTROSE, SODIUM CHLORIDE, SODIUM LACTATE, POTASSIUM CHLORIDE, AND CALCIUM CHLORIDE 5; .6; .31; .03; .02 G/100ML; G/100ML; G/100ML; G/100ML; G/100ML
125 INJECTION, SOLUTION INTRAVENOUS CONTINUOUS
Status: DISCONTINUED | OUTPATIENT
Start: 2024-10-15 | End: 2024-10-15

## 2024-10-15 RX ORDER — CEPHALEXIN 500 MG/1
500 CAPSULE ORAL EVERY 8 HOURS SCHEDULED
Status: COMPLETED | OUTPATIENT
Start: 2024-10-15 | End: 2024-10-17

## 2024-10-15 RX ORDER — FENTANYL CITRATE 50 UG/ML
INJECTION, SOLUTION INTRAMUSCULAR; INTRAVENOUS AS NEEDED
Status: DISCONTINUED | OUTPATIENT
Start: 2024-10-15 | End: 2024-10-15

## 2024-10-15 RX ORDER — NALBUPHINE HYDROCHLORIDE 10 MG/ML
5 INJECTION INTRAMUSCULAR; INTRAVENOUS; SUBCUTANEOUS
Status: ACTIVE | OUTPATIENT
Start: 2024-10-15 | End: 2024-10-16

## 2024-10-15 RX ORDER — ONDANSETRON 2 MG/ML
4 INJECTION INTRAMUSCULAR; INTRAVENOUS EVERY 8 HOURS PRN
Status: DISCONTINUED | OUTPATIENT
Start: 2024-10-15 | End: 2024-10-15 | Stop reason: SDUPTHER

## 2024-10-15 RX ORDER — OXYTOCIN/RINGER'S LACTATE 30/500 ML
PLASTIC BAG, INJECTION (ML) INTRAVENOUS
Status: DISPENSED
Start: 2024-10-15 | End: 2024-10-16

## 2024-10-15 RX ADMIN — FENTANYL CITRATE 15 MCG: 50 INJECTION INTRAMUSCULAR; INTRAVENOUS at 11:21

## 2024-10-15 RX ADMIN — SODIUM CHLORIDE, SODIUM LACTATE, POTASSIUM CHLORIDE, AND CALCIUM CHLORIDE: .6; .31; .03; .02 INJECTION, SOLUTION INTRAVENOUS at 11:13

## 2024-10-15 RX ADMIN — ACETAMINOPHEN 975 MG: 325 TABLET, FILM COATED ORAL at 22:42

## 2024-10-15 RX ADMIN — CEFAZOLIN SODIUM 2000 MG: 2 SOLUTION INTRAVENOUS at 11:13

## 2024-10-15 RX ADMIN — ONDANSETRON 4 MG: 2 INJECTION INTRAMUSCULAR; INTRAVENOUS at 11:31

## 2024-10-15 RX ADMIN — Medication 62.5 MILLI-UNITS/MIN: at 12:45

## 2024-10-15 RX ADMIN — SODIUM CHLORIDE, SODIUM LACTATE, POTASSIUM CHLORIDE, AND CALCIUM CHLORIDE 999 ML/HR: .6; .31; .03; .02 INJECTION, SOLUTION INTRAVENOUS at 17:29

## 2024-10-15 RX ADMIN — KETOROLAC TROMETHAMINE 30 MG: 30 INJECTION, SOLUTION INTRAMUSCULAR; INTRAVENOUS at 12:22

## 2024-10-15 RX ADMIN — CEPHALEXIN 500 MG: 500 CAPSULE ORAL at 16:33

## 2024-10-15 RX ADMIN — METOCLOPRAMIDE 10 MG: 5 INJECTION, SOLUTION INTRAMUSCULAR; INTRAVENOUS at 21:04

## 2024-10-15 RX ADMIN — SODIUM CHLORIDE, SODIUM LACTATE, POTASSIUM CHLORIDE, AND CALCIUM CHLORIDE 1000 ML: .6; .31; .03; .02 INJECTION, SOLUTION INTRAVENOUS at 18:29

## 2024-10-15 RX ADMIN — NOREPINEPHRINE BITARTRATE 1 MCG/MIN: 1 INJECTION INTRAVENOUS at 11:24

## 2024-10-15 RX ADMIN — SODIUM CHLORIDE, SODIUM LACTATE, POTASSIUM CHLORIDE, AND CALCIUM CHLORIDE 100 ML/HR: .6; .31; .03; .02 INJECTION, SOLUTION INTRAVENOUS at 21:07

## 2024-10-15 RX ADMIN — Medication 750 MILLI-UNITS/MIN: at 11:44

## 2024-10-15 RX ADMIN — NIFEDIPINE 30 MG: 30 TABLET, FILM COATED, EXTENDED RELEASE ORAL at 18:26

## 2024-10-15 RX ADMIN — METRONIDAZOLE 500 MG: 500 TABLET ORAL at 16:34

## 2024-10-15 RX ADMIN — BUPIVACAINE HYDROCHLORIDE IN DEXTROSE 1.6 ML: 7.5 INJECTION, SOLUTION SUBARACHNOID at 11:21

## 2024-10-15 RX ADMIN — MORPHINE SULFATE 0.15 MG: 0.5 INJECTION, SOLUTION EPIDURAL; INTRATHECAL; INTRAVENOUS at 11:21

## 2024-10-15 RX ADMIN — SODIUM CHLORIDE 1000 ML: 0.9 INJECTION, SOLUTION INTRAVENOUS at 08:51

## 2024-10-15 RX ADMIN — KETOROLAC TROMETHAMINE 30 MG: 30 INJECTION, SOLUTION INTRAMUSCULAR; INTRAVENOUS at 18:26

## 2024-10-15 RX ADMIN — DEXTROSE, SODIUM CHLORIDE, SODIUM LACTATE, POTASSIUM CHLORIDE, AND CALCIUM CHLORIDE: 5; .6; .31; .03; .02 INJECTION, SOLUTION INTRAVENOUS at 10:21

## 2024-10-15 RX ADMIN — ONDANSETRON 4 MG: 2 INJECTION INTRAMUSCULAR; INTRAVENOUS at 17:05

## 2024-10-15 RX ADMIN — CEPHALEXIN 500 MG: 500 CAPSULE ORAL at 22:42

## 2024-10-15 RX ADMIN — ACETAMINOPHEN 975 MG: 325 TABLET, FILM COATED ORAL at 16:34

## 2024-10-15 RX ADMIN — METRONIDAZOLE 500 MG: 500 TABLET ORAL at 22:42

## 2024-10-15 RX ADMIN — DEXTROSE, SODIUM CHLORIDE, SODIUM LACTATE, POTASSIUM CHLORIDE, AND CALCIUM CHLORIDE 500 ML: 5; .6; .31; .03; .02 INJECTION, SOLUTION INTRAVENOUS at 09:19

## 2024-10-15 NOTE — ASSESSMENT & PLAN NOTE
QBL**, Hgb ** --> AM CBC  Medrano catheter  DVT ppx: SCDs and Lovenox 40 mg daily  Continue routine post partum care  Encourage ambulation  Encourage breastfeeding  Contraception: **  Anticipate discharge POD 2-4

## 2024-10-15 NOTE — ASSESSMENT & PLAN NOTE
Anesthesia consult for regional anesthesia  Ancef 2 g IV for surgical prophylaxis  FU CBC, Syphilis screening, Blood Type & Screen  Contraception: undecided

## 2024-10-15 NOTE — ANESTHESIA PROCEDURE NOTES
Spinal Block    Patient location during procedure: OB/L&D  Start time: 10/15/2024 11:21 AM  Reason for block: primary anesthetic  Staffing  Performed by: Alena Cordero MD  Authorized by: Alena Cordero MD    Preanesthetic Checklist  Completed: patient identified, IV checked, site marked, risks and benefits discussed, surgical consent, monitors and equipment checked, pre-op evaluation and timeout performed  Spinal Block  Patient position: sitting  Prep: Betadine  Patient monitoring: heart rate, continuous pulse ox and frequent blood pressure checks  Approach: midline  Location: L2-3  Needle  Needle type: Pajunk   Needle gauge: 25 G  Needle length: 3.5 in  Assessment  Sensory level: T4  Injection Assessment:  negative aspiration for heme, no paresthesia on injection and positive aspiration for clear CSF.  Post-procedure:  site cleaned

## 2024-10-15 NOTE — ASSESSMENT & PLAN NOTE
Patient reports taking half of lantus last night as instructed  Blood sugar low at 57 on admission   Patient asx  Plan to give fluids with dextrose

## 2024-10-15 NOTE — DISCHARGE SUMMARY
Obstetrics Discharge Summary   Darlin Mckinney 39 y.o. female MRN: [id number]  Unit/Bed#: LD OR 2 Encounter: [id number]    Admission Date: 10/15/2024     Discharge Date: 10/18/2024    Admitting Diagnoses:   Hx of  section  Insulin controlled gestational diabetes mellitus  Placenta succenturiata  Obesity  Gestational hypertension  Depression  Asthma      Discharge Diagnoses:   Same, delivered    Procedures:   repeat  section, low transverse incision      Admitting Attending: Dr. Ballesteros  Delivery Attending: Dr. Ballesteros  Discharge Attending: Dr. Edwards    Hospital Course:   Darlin Mckinney is now a 39 y.o.  who was initially admitted at 37w2d for scheduled repeat  section in the setting of gestational hypertension.     She underwent an uncomplicated low transverse  section delivery on 10/15/2024 and delivered a viable male  at 1143. APGARS were 8, 9 at 1 and 5 minutes, respectively. 's birth weight was 7lb 8.3oz. Placenta delivered without difficulty.   was admitted to the  nursery. Patient tolerated the procedure well and was transferred to recovery in stable condition.     The patient's post partum course was unremarkable.. Her preoperative hemoglobin was 11.8 g/dL, postoperative was 12.5 g/dL. Her postoperative pain was well controlled with oral analgesics.     She was enrolled in the OB Postpartum Blood Pressure Monitoring Program. On day of discharge, her blood pressure was stable on Procardia XL 60 mg qD and Lasix 20 mg for 5 days.     On day of discharge, she was ambulating and able to reasonably perform all ADLs. She was voiding and had appropriate bowel function. Pain was well controlled. She was discharged home on post-operative day #3 without complications. Patient was instructed to follow up with her OB as an outpatient and was given appropriate warnings to call provider if she develops signs of infection or uncontrolled pain. She is breast  feeding .  Mom's blood type is A positive. RhoGAM was not indicated.      Complications:   None    Condition at discharge:   good     Provisions for Follow-Up Care:  See after visit summary for information related to follow-up care and any pertinent home health orders.      Disposition:   Home    Planned Readmission:   No    Discharge Medications:   Please see AVS for a complete list of discharge medications.    Discharge instructions :   Please see AVS for complete discharge instructions.

## 2024-10-15 NOTE — ANESTHESIA PREPROCEDURE EVALUATION
Procedure:   SECTION () REPEAT (Uterus)    Relevant Problems   GI/HEPATIC   (+) Gastroesophageal reflux disease without esophagitis      GYN   (+) 37 weeks gestation of pregnancy   (+) Antepartum multigravida of advanced maternal age   (+) Supervision of high risk pregnancy in third trimester      NEURO/PSYCH   (+) Anxiety   (+) Moderate episode of recurrent major depressive disorder (HCC)      PULMONARY   (+) Asthma      Obstetrics/Gynecology   (+) Gestational hypertension, third trimester   (+) Insulin controlled gestational diabetes mellitus (GDM) in third trimester   (+) Obesity complicating pregnancy, childbirth, or puerperium, antepartum   (+) Placenta succenturiata, third trimester   (+) Polyhydramnios in third trimester   (+) Pregnancy resulting from in vitro fertilization in third trimester      Care Coordination   (+) Maternal care due to low transverse uterine scar from previous  delivery        Physical Exam    Airway    Mallampati score: II         Dental   No notable dental hx     Cardiovascular  Cardiovascular exam normal    Pulmonary  Pulmonary exam normal     Other Findings  post-pubertal.      Anesthesia Plan  ASA Score- 3     Anesthesia Type- spinal with ASA Monitors.         Additional Monitors:     Airway Plan:            Plan Factors-Exercise tolerance (METS): >4 METS.    Chart reviewed.   Existing labs reviewed.     Patient is not a current smoker.      Obstructive sleep apnea risk education given perioperatively.        Induction-     Postoperative Plan-     Perioperative Resuscitation Plan - Level 1 - Full Code.       Informed Consent- Anesthetic plan and risks discussed with patient.

## 2024-10-15 NOTE — H&P
H & P- Obstetrics   Darlin Mckinney 39 y.o. female MRN: [id number]  Unit/Bed#:  TRIAGE 3 Encounter: [id number]    Assessment: 39 y.o.  at 37w2d admitted for RLTCS for gHTN.  SVE: deferred  FHT: 125 bpm, ,moderate variability, 15 x 15 accelerations, no decelerations  Clinical EFW: 7.5lbs  GBS status: neg   Postpartum contraception plan: undecided    Plan:   Placenta succenturiata, third trimester  Assessment & Plan  Accessory lobe of placenta  Will ensure it is present after placental extraction to reduce the risk of retained POC    Gestational hypertension, third trimester  Assessment & Plan  Admission pre-eclampsia labs wnl  Plan for delivery at 37 weeks via RLTCS    Insulin controlled gestational diabetes mellitus (GDM) in third trimester  Assessment & Plan  Patient reports taking half of lantus last night as instructed  Blood sugar low at 57 on admission   Patient asx  Plan to give fluids with dextrose     Obesity complicating pregnancy, childbirth, or puerperium, antepartum  Assessment & Plan  Lovenox 40 mg qD post op for DVT ppx    Maternal care due to low transverse uterine scar from previous  delivery  Assessment & Plan  Reports postop wound infection, no readmission, just managed with oral antibiotics and bedside drainage   Plan for RLTCS  Consider extended SSI ppx post op     Moderate episode of recurrent major depressive disorder (HCC)  Assessment & Plan  No home meds  Monitor for PPD    Asthma  Assessment & Plan  Home meds ordered  Avoid hemabate    * 37 weeks gestation of pregnancy  Assessment & Plan  Anesthesia consult for regional anesthesia  Ancef 2 g IV for surgical prophylaxis  FU CBC, Syphilis screening, Blood Type & Screen  Contraception: undecided           Discussed case and plan w/ Dr. Ballesteros      Chief Complaint: none    HPI: Darlin Mckinney is a 39 y.o.  with an SHEREEN of 11/3/2024, by Ultrasound at 37w2d who is being admitted for scheduled RLTCS for gHTN. She denies HA,  vision changes, CP, SOB, RUQ pain, new sudden swelling. She denies having uterine contractions, has no LOF, and reports no VB. She states she has felt good FM.    Patient Active Problem List   Diagnosis    Asthma    Gastroesophageal reflux disease without esophagitis    Obesity (BMI 30-39.9)    Seasonal allergies    Moderate episode of recurrent major depressive disorder (HCC)    Anxiety    Carrier of hereditary disease    Maternal care due to low transverse uterine scar from previous  delivery    Pregnancy resulting from in vitro fertilization in third trimester    Antepartum multigravida of advanced maternal age    37 weeks gestation of pregnancy    Obesity complicating pregnancy, childbirth, or puerperium, antepartum    Insulin controlled gestational diabetes mellitus (GDM) in third trimester    Supervision of high risk pregnancy in third trimester    Gestational hypertension, third trimester    Placenta succenturiata, third trimester    Polyhydramnios in third trimester       Baby complications/comments: none    Review of Systems   Constitutional:  Negative for chills and fever.   HENT:  Negative for congestion, rhinorrhea, sneezing and sore throat.    Eyes:  Negative for photophobia and visual disturbance.   Respiratory:  Negative for cough and shortness of breath.    Cardiovascular:  Negative for chest pain.   Gastrointestinal:  Negative for diarrhea, nausea and vomiting.   Genitourinary:  Negative for dysuria and frequency.   Neurological:  Negative for dizziness, numbness and headaches.   Psychiatric/Behavioral: Negative.         OB Hx:  OB History    Para Term  AB Living   2 1 1     1   SAB IAB Ectopic Multiple Live Births           1      # Outcome Date GA Lbr Fly/2nd Weight Sex Type Anes PTL Lv   2 Current            1 Term 12 40w0d  3544 g (7 lb 13 oz) M CS-Unspec   YARA      Complications: Fetal Intolerance       Past Medical Hx:  Past Medical History:   Diagnosis Date     Allergic     Anxiety 2012    Diagnosis from [place] [facility] office    Asthma 1997    GERD (gastroesophageal reflux disease) 2009    Headache(784.0)     Random       Past Surgical hx:  Past Surgical History:   Procedure Laterality Date    APPENDECTOMY       SECTION         Social Hx:  Alcohol use: denies  Tobacco use: denies  Other substance use: denies    No Known Allergies      Medications Prior to Admission:     albuterol (Proventil HFA) 90 mcg/act inhaler    aspirin 81 mg chewable tablet    Blood Glucose Monitoring Suppl (OneTouch Verio Reflect) w/Device KIT    cholecalciferol 400 units tablet    fluticasone (FLOVENT HFA) 110 MCG/ACT inhaler    Insulin Pen Needle (Pen Needles) 31G X 5 MM MISC    Lancets (OneTouch Delica Plus [id number]) MISC    Lantus SoloStar 100 units/mL SOPN    omeprazole (PriLOSEC) 20 mg delayed release capsule    OneTouch Verio test strip    Prenatal MV-Min-Fe Fum-FA-DHA (PRENATAL 1 PO)    metoclopramide (Reglan) 10 mg tablet    Objective:  Temp:  [98.5 °F (36.9 °C)] 98.5 °F (36.9 °C)  HR:  [82-93] 85  BP: (133-159)/() 133/97  Resp:  [20] 20  Body mass index is 39.48 kg/m².     Physical Exam:  Physical Exam  Constitutional:       General: She is not in acute distress.     Appearance: Normal appearance. She is not ill-appearing.   Genitourinary:      Genitourinary Comments: SVE deferred   HENT:      Head: Normocephalic and atraumatic.      Mouth/Throat:      Mouth: Mucous membranes are moist. No oral lesions.   Eyes:      General: No scleral icterus.     Extraocular Movements: Extraocular movements intact.   Cardiovascular:      Rate and Rhythm: Normal rate and regular rhythm.      Pulses: Normal pulses.   Pulmonary:      Effort: Pulmonary effort is normal. No respiratory distress.   Abdominal:      Palpations: Abdomen is soft.      Tenderness: There is no abdominal tenderness.      Comments: Gravid   Musculoskeletal:         General: No tenderness.      Right lower leg: No  edema.      Left lower leg: No edema.   Neurological:      General: No focal deficit present.      Mental Status: She is alert and oriented to person, place, and time.   Skin:     General: Skin is warm and dry.   Psychiatric:         Attention and Perception: Attention normal.         Mood and Affect: Mood normal.         Speech: Speech normal.         Behavior: Behavior normal.         Thought Content: Thought content normal.         Judgment: Judgment normal.   Vitals reviewed. Exam conducted with a chaperone present.            FHT:  Baseline Rate (FHR): 125 bpm  Variability: Moderate  Accelerations: 15 x 15 or greater  Decelerations: None    TOCO:   Contraction Frequency (minutes): 3-7  Contraction Duration (seconds):   Contraction Intensity: Moderate    Lab Results   Component Value Date    WBC 10.07 10/15/2024    HGB 11.8 10/15/2024    HCT 35.0 10/15/2024     10/15/2024     Lab Results   Component Value Date    K 3.8 10/15/2024     10/15/2024    CO2 20 (L) 10/15/2024    BUN 11 10/15/2024    CREATININE 0.71 10/15/2024    AST 17 10/15/2024    ALT 11 10/15/2024     Prenatal Labs: Reviewed      Blood type: A pos  Antibody: neg  GBS: neg  HIV: NR  Rubella: immune  Syphilis IgM/IgG: NR  HBsAg: NR  HCAb: NR  Chlamydia: neg  Gonorrhea: neg  3 hour glucose: 3/4 elevated  Platelets: 205  Hgb: 11.8  >2 Midnights  INPATIENT     Signature/Title: Michelle Rodriguez MD  Date: 10/15/2024  Time: 9:41 AM

## 2024-10-15 NOTE — LACTATION NOTE
"This note was copied from a baby's chart.  CONSULT - LACTATION  Baby Boy (Blake Mckinney 0 days male MRN: [id number]    [facility] NURSERY Room / Bed: (N)/(N) Encounter: [id number]    Maternal Information     MOTHER:  Darlin Mckinney  Maternal Age: 39 y.o.  OB History: # 1 - Date: 12, Sex: Male, Weight: 3544 g (7 lb 13 oz), GA: 40w0d, Type: , Unspecified, Apgar1: None, Apgar5: None, Living: Living, Birth Comments: None    # 2 - Date: 10/15/24, Sex: Male, Weight: 3410 g (7 lb 8.3 oz), GA: 37w2d, Type: , Low Transverse, Apgar1: 8, Apgar5: 9, Living: Living, Birth Comments: None   Previouse breast reduction surgery? No    Lactation history:   Has patient previously breast fed: Yes   How long had patient previously breast fed: 2 weeks   Previous breast feeding complications: Other (Comment) (believed she needed to stop r/t antibiotics she was takig at the time for incisional infection.)     Past Surgical History:   Procedure Laterality Date    APPENDECTOMY       SECTION         Birth information:  YOB: 2023   Time of birth: 11:43 AM   Sex: male   Delivery type: , Low Transverse   Birth Weight: 3410 g (7 lb 8.3 oz)   Percent of Weight Change: 0%     Gestational Age: 37w2d      10/15/24 1800   Lactation Consultation   Reason for Consult 10   Maternal Information   Has mother  before? Yes   How long did the the mother previously breastfeed? 2 weeks   Previous Maternal Breastfeeding Challenges Other (Comment)  (believed she needed to stop r/t antibiotics she was takig at the time for incisional infection.)   Breasts/Nipples   Intervention Hand expression  (by bedside RN)   Breast Pump   Pump 3  (Has a Medela breast pump for home use)   Patient Follow-Up   Lactation Consult Status 2   Follow-Up Type Inpatient;Call as needed   Other OB Lactation Documentation    Additional Problem Noted Plans on breastfeeding baby \"[place]\" " longer than his only 12 year old brother. Family visiting at this time. Infant on GDM protocol. Baby beginning to get more interested in latching through the day per Darlin.        Feeding recommendations:  breast feed on demand    Brief explanation of Ready, Set, Baby booklet given. Encouraged pumping if baby not latching well after 24 hours of age. Reassured Darlin there are breast pumps available for her to used here at the hospital if that was necessary. Discussed the availability of donor breast milk if needed/desired.    Encouraged parents to call for assistance, questions, and concerns about breastfeeding.    Debra Real RN 10/15/2024 6:30 PM

## 2024-10-15 NOTE — ASSESSMENT & PLAN NOTE
Accessory lobe of placenta  Will ensure it is present after placental extraction to reduce the risk of retained POC

## 2024-10-15 NOTE — PROGRESS NOTES
" Post Op Check - OB/GYN  Darlin Mckinney 39 y.o. female MRN: [id number]  Unit/Bed#:  420-01 Encounter: [id number]    Chief complaint: POD#0 s/p RLTCS     SUBJECTIVE:    Pain: well controlled with medication  Tolerating PO: not yet, given nausea medications  Voiding: ruiz in place, s/p IVF bolus, 8 cc/hr  Flatus: not yet  BM: not yet  Ambulating: not yet   Breastfeeding:  yes  Chest pain: denies  Shortness of breath: denies  Leg pain: denies  Lochia: wnl     OBJECTIVE:  Vitals:   /92 (BP Location: Right arm)   Pulse 68   Temp 97.7 °F (36.5 °C) (Oral)   Resp 20   Ht 5' 4\" (1.626 m)   Wt 104 kg (230 lb)   LMP 2023 (Exact Date)   SpO2 98%   Breastfeeding Yes   BMI 39.48 kg/m²       Intake/Output Summary (Last 24 hours) at 10/15/2024 1756  Last data filed at 10/15/2024 1415  Gross per 24 hour   Intake 2050 ml   Output 499 ml   Net 1551 ml       Invasive Devices       Peripheral Intravenous Line  Duration             Peripheral IV 10/15/24 Dorsal (posterior);Left Hand <1 day              Drain  Duration             Urethral Catheter <1 day                    Physical Exam:   GEN: Darlin Mckinney appears well, alert and oriented x 3, pleasant and cooperative   PULMONARY: CTAB  CARDIAC: RRR  ABDOMEN: soft, no tenderness, no distention, fundus firm below umbilicus, Mepilex dressing over incision C/D/I  EXTREMITIES: SCDs on and pumping      Labs:   Admission on 10/15/2024   Component Date Value    WBC 10/15/2024 10.07     RBC 10/15/2024 3.93     Hemoglobin 10/15/2024 11.8     Hematocrit 10/15/2024 35.0     MCV 10/15/2024 89     MCH 10/15/2024 30.0     MCHC 10/15/2024 33.7     RDW 10/15/2024 12.7     MPV 10/15/2024 11.2     Platelets 10/15/2024 205     nRBC 10/15/2024 0     Segmented % 10/15/2024 76 (H)     Immature Grans % 10/15/2024 0     Lymphocytes % 10/15/2024 20     Monocytes % 10/15/2024 4     Eosinophils Relative 10/15/2024 0     Basophils Relative 10/15/2024 0     Absolute Neutrophils " 10/15/2024 7.57     Absolute Immature Grans 10/15/2024 0.03     Absolute Lymphocytes 10/15/2024 1.98     Absolute Monocytes 10/15/2024 0.42     Eosinophils Absolute 10/15/2024 0.04     Basophils Absolute 10/15/2024 0.03     ABO Grouping 10/15/2024 A     Rh Factor 10/15/2024 Positive     Antibody Screen 10/15/2024 Negative     Specimen Expiration Date 10/15/2024 [id number]     Syphilis Total Antibody 10/15/2024 Non-reactive     Sodium 10/15/2024 135     Potassium 10/15/2024 3.8     Chloride 10/15/2024 108     CO2 10/15/2024 20 (L)     ANION GAP 10/15/2024 7     BUN 10/15/2024 11     Creatinine 10/15/2024 0.71     Glucose 10/15/2024 64 (L)     Glucose, Fasting 10/15/2024 64 (L)     Calcium 10/15/2024 8.5     Corrected Calcium 10/15/2024 9.2     AST 10/15/2024 17     ALT 10/15/2024 11     Alkaline Phosphatase 10/15/2024 188 (H)     Total Protein 10/15/2024 5.9 (L)     Albumin 10/15/2024 3.1 (L)     Total Bilirubin 10/15/2024 0.87     eGFR 10/15/2024 108     POC Glucose 10/15/2024 59 (L)     POC Glucose 10/15/2024 57 (L)     POC Glucose 10/15/2024 110     pH, Cord Art 10/15/2024 7.235     pCO2, Cord Art 10/15/2024 56.2     pO2, Cord Art 10/15/2024 22.9     HCO3, Cord Art 10/15/2024 23.3     Base Exc, Cord Art 10/15/2024 -4.9 (L)     O2 Content, Cord Art 10/15/2024 8.7     O2 Hgb, Arterial Cord 10/15/2024 44.5     pH, Cord Michael 10/15/2024 7.296     pCO2, Cord Michael 10/15/2024 42.2     pO2, Cord Michael 10/15/2024 22.4     HCO3, Cord Michael 10/15/2024 20.1     Base Exc, Cord Michael 10/15/2024 -6.1 (L)     O2 Cont, Cord Michael 10/15/2024 8.6     O2 HGB,VENOUS CORD 10/15/2024 47.4          ASSESSMENT:  Postop Day #0 s/p RLTCS, stable.    PLAN:  Postoperative care   Hgb 11.8 g/dL --> FU am CBC   Urinary output  8.3 cc/hr, ruiz in place, s/p 2 L IVF bolus. Patient had concentrated urine when ruiz was placed and was an afternoon C/S with extended NPO time. Abdominal exam is benign with no distention, rebound or guarding. HR is normal. BP is  normal. Likely secondary to dehydration. Maintain ruiz and continue to monitor.    Advance diet as tolerated   Encourage ambulation   Encourage breastfeeding   Anticipate discharge POD 2-4    Michelle Rodriguez MD  OBGYN, PGY-2  10/15/2024, 5:56 PM

## 2024-10-15 NOTE — ANESTHESIA POSTPROCEDURE EVALUATION
"Post-Op Assessment Note    CV Status:  Stable    Pain management: adequate       Mental Status:  Awake   Hydration Status:  Stable   PONV Controlled:  Controlled   Airway Patency:  Patent     Post Op Vitals Reviewed: Yes    No anethesia notable event occurred.    Staff: Anesthesiologist       /93   Pulse 96   Temp 98.5 °F (36.9 °C) (Oral)   Resp 20   Ht 5' 4\" (1.626 m)   Wt 104 kg (230 lb)   LMP 12/04/2023 (Exact Date)   Breastfeeding Yes   BMI 39.48 kg/m²       Last Filed PACU Vitals:  Vitals Value Taken Time   Temp     Pulse     BP     Resp     SpO2         Modified Rosemarie:  No data recorded      "

## 2024-10-15 NOTE — PLAN OF CARE
Problem: BIRTH - VAGINAL/ SECTION  Goal: Fetal and maternal status remain reassuring during the birth process  Description: INTERVENTIONS:  - Monitor vital signs  - Monitor fetal heart rate  - Monitor uterine activity  - Monitor labor progression (vaginal delivery)  - DVT prophylaxis  - Antibiotic prophylaxis  Outcome: Progressing  Goal: Emotionally satisfying birthing experience for mother/fetus  Description: Interventions:  - Assess, plan, implement and evaluate the nursing care given to the patient in labor  - Advocate the philosophy that each childbirth experience is a unique experience and support the family's chosen level of involvement and control during the labor process   - Actively participate in both the patient's and family's teaching of the birth process  - Consider cultural, Episcopal and age-specific factors and plan care for the patient in labor  Outcome: Progressing     Problem: POSTPARTUM  Goal: Experiences normal postpartum course  Description: INTERVENTIONS:  - Monitor maternal vital signs  - Assess uterine involution and lochia  Outcome: Progressing  Goal: Appropriate maternal -  bonding  Description: INTERVENTIONS:  - Identify family support  - Assess for appropriate maternal/infant bonding   -Encourage maternal/infant bonding opportunities  - Referral to  or  as needed  Outcome: Progressing  Goal: Establishment of infant feeding pattern  Description: INTERVENTIONS:  - Assess breast/bottle feeding  - Refer to lactation as needed  Outcome: Progressing  Goal: Incision(s), wounds(s) or drain site(s) healing without S/S of infection  Description: INTERVENTIONS  - Assess and document dressing, incision, wound bed, drain sites and surrounding tissue  - Provide patient and family education  - Perform skin care/dressing changes every   Outcome: Progressing

## 2024-10-15 NOTE — ASSESSMENT & PLAN NOTE
Reports postop wound infection, no readmission, just managed with oral antibiotics and bedside drainage   Plan for RLTCS  Consider extended SSI ppx post op

## 2024-10-16 LAB
ERYTHROCYTE [DISTWIDTH] IN BLOOD BY AUTOMATED COUNT: 12.7 % (ref 11.6–15.1)
HCT VFR BLD AUTO: 37.8 % (ref 34.8–46.1)
HGB BLD-MCNC: 12.5 G/DL (ref 11.5–15.4)
MCH RBC QN AUTO: 30.4 PG (ref 26.8–34.3)
MCHC RBC AUTO-ENTMCNC: 33.1 G/DL (ref 31.4–37.4)
MCV RBC AUTO: 92 FL (ref 82–98)
PLATELET # BLD AUTO: 225 THOUSANDS/UL (ref 149–390)
PMV BLD AUTO: 10.6 FL (ref 8.9–12.7)
RBC # BLD AUTO: 4.11 MILLION/UL (ref 3.81–5.12)
WBC # BLD AUTO: 12.3 THOUSAND/UL (ref 4.31–10.16)

## 2024-10-16 PROCEDURE — 99024 POSTOP FOLLOW-UP VISIT: CPT

## 2024-10-16 PROCEDURE — 85027 COMPLETE CBC AUTOMATED: CPT

## 2024-10-16 RX ORDER — NIFEDIPINE 30 MG/1
60 TABLET, EXTENDED RELEASE ORAL EVERY EVENING
Status: DISCONTINUED | OUTPATIENT
Start: 2024-10-16 | End: 2024-10-18 | Stop reason: HOSPADM

## 2024-10-16 RX ORDER — FLUTICASONE PROPIONATE 50 MCG
1 SPRAY, SUSPENSION (ML) NASAL DAILY
Status: DISCONTINUED | OUTPATIENT
Start: 2024-10-16 | End: 2024-10-18 | Stop reason: HOSPADM

## 2024-10-16 RX ORDER — NIFEDIPINE 30 MG/1
30 TABLET, EXTENDED RELEASE ORAL ONCE
Status: DISCONTINUED | OUTPATIENT
Start: 2024-10-16 | End: 2024-10-16

## 2024-10-16 RX ADMIN — KETOROLAC TROMETHAMINE 30 MG: 30 INJECTION, SOLUTION INTRAMUSCULAR; INTRAVENOUS at 01:02

## 2024-10-16 RX ADMIN — FLUTICASONE PROPIONATE 1 SPRAY: 50 SPRAY, METERED NASAL at 13:01

## 2024-10-16 RX ADMIN — CEPHALEXIN 500 MG: 500 CAPSULE ORAL at 06:50

## 2024-10-16 RX ADMIN — ENOXAPARIN SODIUM 40 MG: 40 INJECTION SUBCUTANEOUS at 08:18

## 2024-10-16 RX ADMIN — METRONIDAZOLE 500 MG: 500 TABLET ORAL at 14:01

## 2024-10-16 RX ADMIN — METRONIDAZOLE 500 MG: 500 TABLET ORAL at 22:22

## 2024-10-16 RX ADMIN — ACETAMINOPHEN 650 MG: 325 TABLET, FILM COATED ORAL at 17:20

## 2024-10-16 RX ADMIN — CEPHALEXIN 500 MG: 500 CAPSULE ORAL at 22:22

## 2024-10-16 RX ADMIN — KETOROLAC TROMETHAMINE 15 MG: 30 INJECTION, SOLUTION INTRAMUSCULAR; INTRAVENOUS at 17:20

## 2024-10-16 RX ADMIN — NIFEDIPINE 60 MG: 30 TABLET, FILM COATED, EXTENDED RELEASE ORAL at 18:06

## 2024-10-16 RX ADMIN — KETOROLAC TROMETHAMINE 15 MG: 30 INJECTION, SOLUTION INTRAMUSCULAR; INTRAVENOUS at 11:28

## 2024-10-16 RX ADMIN — METRONIDAZOLE 500 MG: 500 TABLET ORAL at 06:50

## 2024-10-16 RX ADMIN — ACETAMINOPHEN 650 MG: 325 TABLET, FILM COATED ORAL at 11:17

## 2024-10-16 RX ADMIN — ACETAMINOPHEN 975 MG: 325 TABLET, FILM COATED ORAL at 06:50

## 2024-10-16 RX ADMIN — CEPHALEXIN 500 MG: 500 CAPSULE ORAL at 14:01

## 2024-10-16 NOTE — PROGRESS NOTES
"Progress Note - OB/GYN  Darlin Mckinney 39 y.o. female MRN: [id number]  Unit/Bed#: -01 Encounter: [id number]    Assessment and Plan     Darlin Mckinney is a patient of: [facility]. She is PPD# 1 s/p  repeat  section, low transverse incision  Recovering well and is stable       Gestational hypertension, third trimester  Assessment & Plan  Pre-e labs WNL.  Procardia 30 XL initiated 10/15     Moderate episode of recurrent major depressive disorder (HCC)  Assessment & Plan  No home meds  Monitor for PPD    Asthma  Assessment & Plan  Home meds ordered  Avoid hemabate    * Status post repeat low transverse  section  Assessment & Plan  Continue oral analgesics for pain control  Continue family bonding.   ; Hgb 11.8 - 12.5   Medrano out; voiding trial  SSI ppx: mepilex, keflex, and flagyl   DVT ppx: SCDs, Lovenox 40mg QD  Procardia 30 XL started 10/15  Contraception: undecided         Disposition    - Anticipate discharge home on PPD# 2-4      Subjective/Objective     Chief Complaint: Postpartum State     Subjective:    Darlin Mckinney is POD#1 s/p  repeat  section, low transverse incision. She has no current complaints.  Pain is well controlled. She is recovering well and is stable.     Breastfeeding:  yes and supplementing with formula    Tolerating PO: yes  Nausea or Vomiting: no  Voiding: yes  Flatus: yes; has had no bowel movement.   Ambulating: yes  Chest pain: no  Shortness of breath: no  Leg pain: no  Lochia: appropriate     Vitals:   /92 (BP Location: Right arm)   Pulse 81   Temp 98.3 °F (36.8 °C) (Oral)   Resp 16   Ht 5' 4\" (1.626 m)   Wt 104 kg (230 lb)   LMP 2023 (Exact Date)   SpO2 97%   Breastfeeding Yes   BMI 39.48 kg/m²       Intake/Output Summary (Last 24 hours) at 10/16/2024 0922  Last data filed at 10/16/2024 0336  Gross per 24 hour   Intake 1550 ml   Output 2424 ml   Net -874 ml       Invasive Devices       Peripheral Intravenous Line  Duration             Peripheral " IV 10/15/24 Dorsal (posterior);Left Hand 1 day                    Physical Exam:   GEN: Darlin Mckinney appears well, alert and oriented x 3, pleasant and cooperative   CARDIO: RRR, no murmurs or rubs  RESP:  CTAB, no wheezes or rales  ABDOMEN: soft, no tenderness, no distention, fundus @ U-1, Incision C/D/I  EXTREMITIES: SCDs on, non tender, no erythema, b/l Theo's sign negative, +1 bilateral lower extremity edema      Labs:     Hemoglobin   Date Value Ref Range Status   10/16/2024 12.5 11.5 - 15.4 g/dL Final   10/15/2024 10.3 (L) 11.5 - 15.4 g/dL Final     WBC   Date Value Ref Range Status   10/16/2024 12.30 (H) 4.31 - 10.16 Thousand/uL Final   10/15/2024 11.76 (H) 4.31 - 10.16 Thousand/uL Final     Platelets   Date Value Ref Range Status   10/16/2024 225 149 - 390 Thousands/uL Final   10/15/2024 190 149 - 390 Thousands/uL Final     Creatinine   Date Value Ref Range Status   10/15/2024 0.66 0.60 - 1.30 mg/dL Final     Comment:     Standardized to IDMS reference method   10/15/2024 0.71 0.60 - 1.30 mg/dL Final     Comment:     Standardized to IDMS reference method     AST   Date Value Ref Range Status   10/15/2024 17 13 - 39 U/L Final   10/09/2024 17 13 - 39 U/L Final     ALT   Date Value Ref Range Status   10/15/2024 11 7 - 52 U/L Final     Comment:     Specimen collection should occur prior to Sulfasalazine administration due to the potential for falsely depressed results.    10/09/2024 12 7 - 52 U/L Final     Comment:     Specimen collection should occur prior to Sulfasalazine administration due to the potential for falsely depressed results.           EDGARDO Shrestha  10/16/2024  9:22 AM

## 2024-10-16 NOTE — ASSESSMENT & PLAN NOTE
Pre-e labs WNL.  Procardia 30 XL initiated 10/15   10/16: Procardia XL 60 mg qD  10/17: added Lasix 20 mg x5days     Systolic (12hrs), Av , Min:129 , Max:155   Diastolic (12hrs), Av, Min:86, Max:99

## 2024-10-16 NOTE — LACTATION NOTE
This note was copied from a baby's chart.    In to see family this morning. Mom states baby is nursing well BUT feels she needs to supplement. Discussed risks for early supplementation: over feeding, longer digestion times, engorgement for mom, lower milk supply for mom, and nipple confusion.     Benefits of breast feeding for infant's intestinal tract, less engorgement for mom, protection from multiple disease processes as infant develops, avoidance of over feeding for infant, less nipple confusion, and increased health benefits for mom.    Verbal review of  positioning and alignment. Mom is encouraged to:     - Bring baby up to the breast (use of pillows to elevate so baby's torso is against mom's breasts)   - Skin to skin for feedings with top hand exposed to show signs of satiation   - Chin deep into breast tissue (make baby look up to the nipple)   - nose aligned to the nipple   -Wait for wide gape, drag chin on the breast so nipple is aimed at the upper, back palate  - Cheek should be touching breast   - Deep, firm hold of baby with ear, shoulder, hip alignment     10/16/24 104   Maternal Information   Has mother  before? Yes   How long did the the mother previously breastfeed? 2 weeks   Previous Maternal Breastfeeding Challenges Other (Comment)  (stopped because she was on antibiotics from incisional infection)   Infant to breast within first hour of birth? Yes   Exclusive Pump and Bottle Feed No   LATCH Documentation   Having latch problems? No  (as per Mom; encouraged lactation asssistance)   Breasts/Nipples   Intervention Other (comment)  (Review good latch/feed; Risks of Early supplement & Support available)   Breastfeeding Progress Not yet established   Reasons for not Breastfeeding Maternal preference  (Risks of Early supplement)   Other OB Lactation Tools   Feeding Devices Bottle   Breast Pump   Pump 3  (has Medela from Insurance)   Pump Review/Education Setup, frequency, and cleaning;Milk  storage   Patient Follow-Up   Lactation Consult Status 2   Follow-Up Type Inpatient;Call as needed   Other OB Lactation Documentation    Additional Problem Noted Mom states baby is nursing well; Risks of early supplemntation  (Review of BOTH Booklets)     Encouraged parents to call for assistance, questions, and concerns about breastfeeding.  Extension provided.

## 2024-10-16 NOTE — PLAN OF CARE
Problem: POSTPARTUM  Goal: Experiences normal postpartum course  Description: INTERVENTIONS:  - Monitor maternal vital signs  - Assess uterine involution and lochia  Outcome: Progressing  Goal: Appropriate maternal -  bonding  Description: INTERVENTIONS:  - Identify family support  - Assess for appropriate maternal/infant bonding   -Encourage maternal/infant bonding opportunities  - Referral to  or  as needed  Outcome: Progressing  Goal: Establishment of infant feeding pattern  Description: INTERVENTIONS:  - Assess breast/bottle feeding  - Refer to lactation as needed  Outcome: Progressing  Goal: Incision(s), wounds(s) or drain site(s) healing without S/S of infection  Description: INTERVENTIONS  - Assess and document dressing, incision, wound bed, drain sites and surrounding tissue  - Provide patient and family education  - Perform skin care/dressing changes every  Outcome: Progressing     Problem: BIRTH - VAGINAL/ SECTION  Goal: Fetal and maternal status remain reassuring during the birth process  Description: INTERVENTIONS:  - Monitor vital signs  - Monitor fetal heart rate  - Monitor uterine activity  - Monitor labor progression (vaginal delivery)  - DVT prophylaxis  - Antibiotic prophylaxis  Outcome: Completed  Goal: Emotionally satisfying birthing experience for mother/fetus  Description: Interventions:  - Assess, plan, implement and evaluate the nursing care given to the patient in labor  - Advocate the philosophy that each childbirth experience is a unique experience and support the family's chosen level of involvement and control during the labor process   - Actively participate in both the patient's and family's teaching of the birth process  - Consider cultural, Spiritism and age-specific factors and plan care for the patient in labor  Outcome: Completed

## 2024-10-16 NOTE — ASSESSMENT & PLAN NOTE
Continue oral analgesics for pain control  Continue family bonding.   ; Hgb 11.8 - 12.5   S/p Medrano, voiding spontaneously  S/p SSI ppx: mepilex, keflex, and flagyl   DVT ppx: SCDs, Lovenox 40mg QD  Contraception: undecided

## 2024-10-16 NOTE — PLAN OF CARE
Problem: POSTPARTUM  Goal: Experiences normal postpartum course  Description: INTERVENTIONS:  - Monitor maternal vital signs  - Assess uterine involution and lochia  Outcome: Progressing  Goal: Appropriate maternal -  bonding  Description: INTERVENTIONS:  - Identify family support  - Assess for appropriate maternal/infant bonding   -Encourage maternal/infant bonding opportunities  - Referral to  or  as needed  Outcome: Progressing  Goal: Establishment of infant feeding pattern  Description: INTERVENTIONS:  - Assess breast/bottle feeding  - Refer to lactation as needed  Outcome: Progressing  Goal: Incision(s), wounds(s) or drain site(s) healing without S/S of infection  Description: INTERVENTIONS  - Assess and document dressing, incision, wound bed, drain sites and surrounding tissue  - Provide patient and family education  - Perform skin care/dressing changes every tcome: Progressing

## 2024-10-17 DIAGNOSIS — Z13.1 DIABETES MELLITUS SCREENING: Primary | ICD-10-CM

## 2024-10-17 DIAGNOSIS — Z86.32 HISTORY OF GESTATIONAL DIABETES MELLITUS (GDM), NOT CURRENTLY PREGNANT: ICD-10-CM

## 2024-10-17 PROCEDURE — 99024 POSTOP FOLLOW-UP VISIT: CPT | Performed by: OBSTETRICS & GYNECOLOGY

## 2024-10-17 RX ORDER — DIPHENHYDRAMINE HYDROCHLORIDE 50 MG/ML
25 INJECTION INTRAMUSCULAR; INTRAVENOUS ONCE
Status: COMPLETED | OUTPATIENT
Start: 2024-10-17 | End: 2024-10-17

## 2024-10-17 RX ORDER — FUROSEMIDE 20 MG/1
20 TABLET ORAL DAILY
Status: DISCONTINUED | OUTPATIENT
Start: 2024-10-17 | End: 2024-10-18 | Stop reason: HOSPADM

## 2024-10-17 RX ORDER — METOCLOPRAMIDE HYDROCHLORIDE 5 MG/ML
10 INJECTION INTRAMUSCULAR; INTRAVENOUS ONCE
Status: COMPLETED | OUTPATIENT
Start: 2024-10-17 | End: 2024-10-17

## 2024-10-17 RX ORDER — IBUPROFEN 600 MG/1
600 TABLET, FILM COATED ORAL EVERY 6 HOURS SCHEDULED
Status: DISCONTINUED | OUTPATIENT
Start: 2024-10-17 | End: 2024-10-18 | Stop reason: HOSPADM

## 2024-10-17 RX ADMIN — FUROSEMIDE 20 MG: 20 TABLET ORAL at 11:21

## 2024-10-17 RX ADMIN — KETOROLAC TROMETHAMINE 15 MG: 30 INJECTION, SOLUTION INTRAMUSCULAR; INTRAVENOUS at 06:10

## 2024-10-17 RX ADMIN — DIPHENHYDRAMINE HYDROCHLORIDE 25 MG: 50 INJECTION, SOLUTION INTRAMUSCULAR; INTRAVENOUS at 09:06

## 2024-10-17 RX ADMIN — METRONIDAZOLE 500 MG: 500 TABLET ORAL at 06:29

## 2024-10-17 RX ADMIN — ACETAMINOPHEN 650 MG: 325 TABLET, FILM COATED ORAL at 18:15

## 2024-10-17 RX ADMIN — ACETAMINOPHEN 650 MG: 325 TABLET, FILM COATED ORAL at 12:25

## 2024-10-17 RX ADMIN — NIFEDIPINE 60 MG: 30 TABLET, FILM COATED, EXTENDED RELEASE ORAL at 18:15

## 2024-10-17 RX ADMIN — IBUPROFEN 600 MG: 600 TABLET, FILM COATED ORAL at 12:25

## 2024-10-17 RX ADMIN — ENOXAPARIN SODIUM 40 MG: 40 INJECTION SUBCUTANEOUS at 08:32

## 2024-10-17 RX ADMIN — ACETAMINOPHEN 650 MG: 325 TABLET, FILM COATED ORAL at 06:10

## 2024-10-17 RX ADMIN — FLUTICASONE FUROATE 1 PUFF: 100 POWDER RESPIRATORY (INHALATION) at 10:17

## 2024-10-17 RX ADMIN — IBUPROFEN 600 MG: 600 TABLET, FILM COATED ORAL at 18:15

## 2024-10-17 RX ADMIN — KETOROLAC TROMETHAMINE 15 MG: 30 INJECTION, SOLUTION INTRAMUSCULAR; INTRAVENOUS at 00:02

## 2024-10-17 RX ADMIN — METOCLOPRAMIDE 10 MG: 5 INJECTION, SOLUTION INTRAMUSCULAR; INTRAVENOUS at 09:06

## 2024-10-17 RX ADMIN — OXYCODONE 5 MG: 5 TABLET ORAL at 03:02

## 2024-10-17 RX ADMIN — ACETAMINOPHEN 650 MG: 325 TABLET, FILM COATED ORAL at 00:02

## 2024-10-17 RX ADMIN — CEPHALEXIN 500 MG: 500 CAPSULE ORAL at 06:11

## 2024-10-17 NOTE — PROGRESS NOTES
Today's Date: 10/17/2024  Pt's Preferred Lab: None Specified  Ambulatory Order    Lab Ordered: 2hr (75GM) GTT   Reason: to screen for T2DM s/p delivery r/t H/O GDM  Time-Frame to be collected: 4-12 weeks postpartum    Patient Instructions: Fasting = Do NOT eat or drink anything except WATER for at least 8 hours before the test.    *For [facility], please call [phone] to schedule.   *To request lab be sent to alternative lab including [facility] or [facility], Call: [phone] or Message Diabetes Team via "[employer] Message to EDGARDO Bowie RD   Diabetes Educator   [facility] - Maternal Fetal Medicine  Diabetes and Pregnancy Program

## 2024-10-17 NOTE — PROGRESS NOTES
"Progress Note - OB/GYN  Darlin Mckinney 39 y.o. female MRN: [id number]  Unit/Bed#: -01 Encounter: [id number]    Assessment and Plan     Darlin Mckinney is a patient of: [facility]. She is PPD# 2 s/p  repeat  section, low transverse incision  Recovering well and is stable       Gestational hypertension, third trimester  Assessment & Plan  Pre-e labs WNL.  Procardia 30 XL initiated 10/15   Systolic (12hrs), Av , Min:138 , Max:147   Diastolic (12hrs), Av, Min:90, Max:98          Moderate episode of recurrent major depressive disorder (HCC)  Assessment & Plan  No home meds  Monitor for PPD    Asthma  Assessment & Plan  Home meds ordered  Avoid hemabate    * Status post repeat low transverse  section  Assessment & Plan  Continue oral analgesics for pain control  Continue family bonding.   ; Hgb 11.8 - 12.5   Medrano out; voiding trial  SSI ppx: mepilex, keflex, and flagyl   DVT ppx: SCDs, Lovenox 40mg QD  Procardia 30 XL started 10/15  Contraception: undecided         Disposition    - Anticipate discharge home on PPD# 2-4      Subjective/Objective     Chief Complaint: Postpartum State     Subjective:    Darlin Mckinney is POD#2 s/p  repeat  section, low transverse incision. She has no current complaints.  Pain is well controlled. She is recovering well and is stable.     Breastfeeding:  yes and supplementing with formula    Tolerating PO: yes  Nausea or Vomiting: no  Voiding: yes  Flatus: yes; has had no bowel movement.   Ambulating: yes  Chest pain: no  Shortness of breath: no  Leg pain: no  Lochia: appropriate     Vitals:   /90 (BP Location: Right arm)   Pulse 80   Temp 98 °F (36.7 °C) (Oral)   Resp 18   Ht 5' 4\" (1.626 m)   Wt 104 kg (230 lb)   LMP 2023 (Exact Date)   SpO2 98%   Breastfeeding Yes   BMI 39.48 kg/m²       Intake/Output Summary (Last 24 hours) at 10/17/2024 0620  Last data filed at 10/16/2024 2201  Gross per 24 hour   Intake --   Output 2800 ml   Net -2800 " ml       Invasive Devices       Peripheral Intravenous Line  Duration             Peripheral IV 10/15/24 Dorsal (posterior);Left Hand 1 day                    Physical Exam:   GEN: Darlin Mckinney appears well, alert and oriented x 3, pleasant and cooperative   CARDIO: RRR, no murmurs or rubs  RESP:  CTAB, no wheezes or rales  ABDOMEN: soft, no tenderness, no distention, fundus @ U-1, Incision C/D/I  EXTREMITIES: non tender, no erythema, b/l Theo's sign negative, +1 bilateral lower extremity edema      Labs:     Hemoglobin   Date Value Ref Range Status   10/16/2024 12.5 11.5 - 15.4 g/dL Final   10/15/2024 10.3 (L) 11.5 - 15.4 g/dL Final     WBC   Date Value Ref Range Status   10/16/2024 12.30 (H) 4.31 - 10.16 Thousand/uL Final   10/15/2024 11.76 (H) 4.31 - 10.16 Thousand/uL Final     Platelets   Date Value Ref Range Status   10/16/2024 225 149 - 390 Thousands/uL Final   10/15/2024 190 149 - 390 Thousands/uL Final     Creatinine   Date Value Ref Range Status   10/15/2024 0.66 0.60 - 1.30 mg/dL Final     Comment:     Standardized to IDMS reference method   10/15/2024 0.71 0.60 - 1.30 mg/dL Final     Comment:     Standardized to IDMS reference method     AST   Date Value Ref Range Status   10/15/2024 17 13 - 39 U/L Final   10/09/2024 17 13 - 39 U/L Final     ALT   Date Value Ref Range Status   10/15/2024 11 7 - 52 U/L Final     Comment:     Specimen collection should occur prior to Sulfasalazine administration due to the potential for falsely depressed results.    10/09/2024 12 7 - 52 U/L Final     Comment:     Specimen collection should occur prior to Sulfasalazine administration due to the potential for falsely depressed results.           Michelle Rodriguez MD  10/17/2024  6:20 AM

## 2024-10-17 NOTE — LACTATION NOTE
This note was copied from a baby's chart.    In to see family. Mom states nursing much better. Denies need for assistance or supplies. Verbal review of positioning and alignment. Mom is encouraged to:     - Bring baby up to the breast (use of pillows to elevate so baby's torso is against mom's breasts)   - Skin to skin for feedings with top hand exposed to show signs of satiation   - Chin deep into breast tissue (make baby look up to the nipple)   - nose aligned to the nipple   -Wait for wide gape, drag chin on the breast so nipple is aimed at the upper, back palate  - Cheek should be touching breast   - Deep, firm hold of baby with ear, shoulder, hip alignment     10/17/24 7247   Maternal Information   Has mother  before? Yes   How long did the the mother previously breastfeed? 2 weeks   Previous Maternal Breastfeeding Challenges Other (Comment)  (stopped due to medication for surgical incsion infection)   Infant to breast within first hour of birth? Yes   Exclusive Pump and Bottle Feed No   Breasts/Nipples   Intervention Other (comment)  (Review good latch/feed; Booklets & support available)   Breastfeeding Progress Not yet established   Reasons for not Breastfeeding Maternal preference   Breast Pump   Pump 3   Patient Follow-Up   Lactation Consult Status 2   Follow-Up Type Inpatient;Call as needed   Other OB Lactation Documentation    Additional Problem Noted Mom states nursing is going much better  (has BOTH Booklets)     Encoraged nursing parent to call for assistance, questions and concerns.  Extension number for inpatient lactation support provided.

## 2024-10-18 ENCOUNTER — LACTATION ENCOUNTER (OUTPATIENT)
Dept: NURSERY | Facility: HOSPITAL | Age: 39
End: 2024-10-18

## 2024-10-18 VITALS
RESPIRATION RATE: 16 BRPM | OXYGEN SATURATION: 98 % | HEART RATE: 88 BPM | WEIGHT: 230 LBS | DIASTOLIC BLOOD PRESSURE: 91 MMHG | BODY MASS INDEX: 39.27 KG/M2 | TEMPERATURE: 98.6 F | SYSTOLIC BLOOD PRESSURE: 134 MMHG | HEIGHT: 64 IN

## 2024-10-18 PROCEDURE — NC001 PR NO CHARGE: Performed by: OBSTETRICS & GYNECOLOGY

## 2024-10-18 PROCEDURE — 99024 POSTOP FOLLOW-UP VISIT: CPT | Performed by: OBSTETRICS & GYNECOLOGY

## 2024-10-18 RX ORDER — NIFEDIPINE 60 MG/1
60 TABLET, EXTENDED RELEASE ORAL DAILY
Qty: 30 TABLET | Refills: 1 | Status: SHIPPED | OUTPATIENT
Start: 2024-10-18

## 2024-10-18 RX ORDER — LABETALOL 200 MG/1
200 TABLET, FILM COATED ORAL EVERY 12 HOURS SCHEDULED
Qty: 60 TABLET | Refills: 1 | Status: SHIPPED | OUTPATIENT
Start: 2024-10-18

## 2024-10-18 RX ORDER — OXYCODONE HYDROCHLORIDE 5 MG/1
5 TABLET ORAL EVERY 6 HOURS PRN
Qty: 12 TABLET | Refills: 0 | Status: SHIPPED | OUTPATIENT
Start: 2024-10-18

## 2024-10-18 RX ORDER — IBUPROFEN 600 MG/1
600 TABLET, FILM COATED ORAL EVERY 6 HOURS PRN
Qty: 50 TABLET | Refills: 1 | Status: SHIPPED | OUTPATIENT
Start: 2024-10-18

## 2024-10-18 RX ORDER — LABETALOL 200 MG/1
200 TABLET, FILM COATED ORAL EVERY 12 HOURS SCHEDULED
Status: DISCONTINUED | OUTPATIENT
Start: 2024-10-18 | End: 2024-10-18 | Stop reason: HOSPADM

## 2024-10-18 RX ORDER — FUROSEMIDE 20 MG/1
20 TABLET ORAL DAILY
Qty: 3 TABLET | Refills: 0 | Status: SHIPPED | OUTPATIENT
Start: 2024-10-19 | End: 2024-10-22

## 2024-10-18 RX ADMIN — OXYCODONE 5 MG: 5 TABLET ORAL at 16:01

## 2024-10-18 RX ADMIN — ACETAMINOPHEN 650 MG: 325 TABLET, FILM COATED ORAL at 00:18

## 2024-10-18 RX ADMIN — IBUPROFEN 600 MG: 600 TABLET, FILM COATED ORAL at 12:02

## 2024-10-18 RX ADMIN — ACETAMINOPHEN 650 MG: 325 TABLET, FILM COATED ORAL at 12:01

## 2024-10-18 RX ADMIN — ENOXAPARIN SODIUM 40 MG: 40 INJECTION SUBCUTANEOUS at 08:11

## 2024-10-18 RX ADMIN — OXYCODONE 5 MG: 5 TABLET ORAL at 04:18

## 2024-10-18 RX ADMIN — FUROSEMIDE 20 MG: 20 TABLET ORAL at 08:13

## 2024-10-18 RX ADMIN — IBUPROFEN 600 MG: 600 TABLET, FILM COATED ORAL at 06:30

## 2024-10-18 RX ADMIN — IBUPROFEN 600 MG: 600 TABLET, FILM COATED ORAL at 00:18

## 2024-10-18 RX ADMIN — ACETAMINOPHEN 650 MG: 325 TABLET, FILM COATED ORAL at 06:30

## 2024-10-18 RX ADMIN — FLUTICASONE FUROATE 1 PUFF: 100 POWDER RESPIRATORY (INHALATION) at 08:12

## 2024-10-18 RX ADMIN — LABETALOL HYDROCHLORIDE 200 MG: 200 TABLET, FILM COATED ORAL at 12:31

## 2024-10-18 NOTE — QUICK NOTE
This patient is Enrolled in the OB Postpartum Blood Pressure Monitoring Program.    Makayla Beltran MD  OBGYN, PGY-I  10/18/24  7:52 AM

## 2024-10-18 NOTE — NURSING NOTE
Patient enrolled in home BP monitoring program.  Education provided.  BP confirmed with [facility] nurse.

## 2024-10-18 NOTE — PROGRESS NOTES
Progress Note - OB/GYN   Darlin Mckinney 39 y.o. female MRN: [id number]  Unit/Bed#: -01 Encounter: [id number]      Assessment/Plan    Darlin Mckinney is a 39 y.o.  who is POD 3 s/p RLTCS at 37w2d     Gestational hypertension, third trimester  Assessment & Plan  Pre-e labs WNL.  Procardia 30 XL initiated 10/15   10/16: Procardia XL 60 mg qD  10/17: added Lasix 20 mg x5days     Systolic (12hrs), Av , Min:129 , Max:155   Diastolic (12hrs), Av, Min:86, Max:99          Insulin controlled gestational diabetes mellitus (GDM) in third trimester  Assessment & Plan  2 hr GTT postpartum    Moderate episode of recurrent major depressive disorder (HCC)  Assessment & Plan  No home meds  Monitor for PPD    Asthma  Assessment & Plan  Home meds ordered  Avoid hemabate    * Status post repeat low transverse  section  Assessment & Plan  Continue oral analgesics for pain control  Continue family bonding.   ; Hgb 11.8 - 12.5   S/p Medrano, voiding spontaneously  S/p SSI ppx: mepilex, keflex, and flagyl   DVT ppx: SCDs, Lovenox 40mg QD  Contraception: undecided            Disposition: Anticipate discharge home postpartum Day #3-4  Barriers to discharge: blood pressures      Subjective/Objective     Subjective: Postpartum state    Pain: no  Tolerating PO: yes  Voiding: yes  Flatus: yes  Ambulating: yes  Breastfeeding: Breastfeeding  Chest pain: no  Shortness of breath: no  Leg pain: no  Lochia: wnl    Objective:     Vitals:  Vitals:    10/17/24 1100 10/17/24 1540 10/17/24 2341 10/18/24 0300   BP: 157/95 126/90 129/99 155/86   BP Location:   Right arm Right arm   Pulse: 87 89 101 93   Resp:  18 16   Temp: 97.5 °F (36.4 °C) 98.1 °F (36.7 °C) (!) 97.3 °F (36.3 °C) 98.1 °F (36.7 °C)   TempSrc: Oral Oral Oral Oral   SpO2: 98% 98% 99% 99%   Weight:       Height:           Physical Exam:   GEN: appears well, alert and oriented x 3, pleasant and cooperative   CV: Regular rate  RESP: non labored breathing  ABDOMEN:  soft, no tenderness, no distention, Uterine fundus firm and non-tender, -1 cm below the umbilicus, incision c/d/I   EXTREMITIES: non-tender, +1 BLE edema  NEURO Alert and oriented to person, place, and time.       Lab Results   Component Value Date    WBC 12.30 (H) 10/16/2024    HGB 12.5 10/16/2024    HCT 37.8 10/16/2024    MCV 92 10/16/2024     10/16/2024         Makayla Beltran MD  Obstetrics & Gynecology  10/18/24

## 2024-10-18 NOTE — PLAN OF CARE
Problem: POSTPARTUM  Goal: Experiences normal postpartum course  Description: INTERVENTIONS:  - Monitor maternal vital signs  - Assess uterine involution and lochia  10/18/2024 1717 by Jenny Hwang RN  Outcome: Adequate for Discharge  10/18/2024 0810 by Jenny Hwang RN  Outcome: Progressing  Goal: Appropriate maternal -  bonding  Description: INTERVENTIONS:  - Identify family support  - Assess for appropriate maternal/infant bonding   -Encourage maternal/infant bonding opportunities  - Referral to  or  as needed  10/18/2024 1717 by Jenny Hwang RN  Outcome: Adequate for Discharge  10/18/2024 0810 by Jenny Hwang RN  Outcome: Progressing  Goal: Establishment of infant feeding pattern  Description: INTERVENTIONS:  - Assess breast/bottle feeding  - Refer to lactation as needed  10/18/2024 1717 by Jenny Hwang RN  Outcome: Adequate for Discharge  10/18/2024 0810 by Jenny Hwang RN  Outcome: Progressing  Goal: Incision(s), wounds(s) or drain site(s) healing without S/S of infection  Description: INTERVENTIONS  - Assess and document dressing, incision, wound bed, drain sites and surrounding tissue  - Provide patient and family education  10/18/2024 1717 by Jenny Hwang RN  Outcome: Adequate for Discharge  10/18/2024 0810 by Jenny Hwang RN  Outcome: Progressing

## 2024-10-19 NOTE — LACTATION NOTE
This note was copied from a baby's chart.      In to see family today. Mom states nursing is going well BUT struggling with right side a bit. Education on positioning and alignment. Mom is encouraged to:     - Bring baby up to the breast (use of pillows to elevate so baby's torso is against mom's breasts)   - Skin to skin for feedings with top hand exposed to show signs of satiation   - Chin deep into breast tissue (make baby look up to the nipple)   - nose aligned to the nipple   -Wait for wide gape, drag chin on the breast so nipple is aimed at the upper, back palate  - Cheek should be touching breast   - Deep, firm hold of baby with ear, shoulder, hip alignment    With permission, Worked on positioning infant up at chest level and starting to feed infant with nose arriving at the nipple. Then, using areolar compression to achieve a deep latch that is comfortable and exchanges optimum amounts of milk.      10/18/24 1030   LATCH Documentation   Latch 2   Audible Swallowing 1   Type of Nipple 2   Comfort (Breast/Nipple) 1   Hold (Positioning) 1  (having trouble on right side)   LATCH Score 7   Having latch problems? No  (working on consistent deep latch)   Position(s) Used Football;Laid Back   Breasts/Nipples   Left Breast Soft   Right Breast Soft   Left Nipple Everted   Right Nipple Everted   Intervention Other (comment);Hand expression  (helped with positioning on right side)   Breastfeeding Progress Not yet established   Breast Pump   Pump 3  (has Medela from Insurance)   Patient Follow-Up   Lactation Consult Status 2   Follow-Up Type Inpatient;Call as needed   Other OB Lactation Documentation    Additional Problem Noted helped with positioning on right  (has BOTH Booklets)     Mom states she is ready for discharge home today and is aware of support available  Dad at bedside.     Encoraged nursing parent to call for assistance, questions and concerns.  Extension number for inpatient lactation support  provided.

## 2024-10-21 NOTE — UTILIZATION REVIEW
NOTIFICATION OF ADMISSION DISCHARGE   This is a Notification of Discharge from [facility]. Please be advised that this patient has been discharge from our facility. Below you will find the admission and discharge date and time including the patient’s disposition.   UTILIZATION REVIEW CONTACT:  Belen Rodriguez  Utilization   Network Utilization Review Department  Phone: [phone] x carefully listen to the prompts. All voicemails are confidential.  Email: [email or web address]     ADMISSION INFORMATION  PRESENTATION DATE: 10/15/2024  8:07 AM  OBERVATION ADMISSION DATE: N/A  INPATIENT ADMISSION DATE: 10/15/24  9:15 AM   DISCHARGE DATE: 10/18/2024  5:30 PM   DISPOSITION:Home/Self Care    Network Utilization Review Department  ATTENTION: Please call with any questions or concerns to [phone] and carefully listen to the prompts so that you are directed to the right person. All voicemails are confidential.   For Discharge needs, contact Care Management DC Support Team at [phone] opt. 2  Send all requests for admission clinical reviews, approved or denied determinations and any other requests to dedicated fax number below belonging to the campus where the patient is receiving treatment. List of dedicated fax numbers for the Facilities:  FACILITY NAME UR FAX NUMBER   ADMISSION DENIALS (Administrative/Medical Necessity) [phone]   DISCHARGE SUPPORT TEAM ([facility]) [phone]   PARENT CHILD HEALTH (Maternity/NICU/Pediatrics) [phone]   [facility] [phone]   [facility] [phone]   [facility] [phone]   [facility] [phone]   [facility] [phone]   [facility] [phone]   [facility] [phone]   [facility] [phone]    [facility] [phone]   [facility] [phone]   [facility] [phone]   [facility] [phone]

## 2024-10-29 ENCOUNTER — TELEPHONE (OUTPATIENT)
Dept: OBGYN CLINIC | Facility: MEDICAL CENTER | Age: 39
End: 2024-10-29

## 2024-10-29 NOTE — TELEPHONE ENCOUNTER
Patient has been noncompliant in BP monitoring program. Patient has not submitted blood pressure readings since 10/27.  Called to remind patient to submit readings.  Spoke to patient.  Reviewed instructions for submitting readings. Verbalized understanding and will submit BP readings.

## 2024-10-30 ENCOUNTER — POSTPARTUM VISIT (OUTPATIENT)
Dept: OBGYN CLINIC | Facility: CLINIC | Age: 39
End: 2024-10-30

## 2024-10-30 VITALS
HEIGHT: 64 IN | WEIGHT: 202 LBS | SYSTOLIC BLOOD PRESSURE: 120 MMHG | HEART RATE: 88 BPM | DIASTOLIC BLOOD PRESSURE: 72 MMHG | OXYGEN SATURATION: 99 % | BODY MASS INDEX: 34.49 KG/M2

## 2024-10-30 PROBLEM — O99.210 OBESITY COMPLICATING PREGNANCY, CHILDBIRTH, OR PUERPERIUM, ANTEPARTUM: Status: RESOLVED | Noted: 2024-04-23 | Resolved: 2024-10-30

## 2024-10-30 PROBLEM — Z98.891 STATUS POST REPEAT LOW TRANSVERSE CESAREAN SECTION: Status: RESOLVED | Noted: 2024-04-23 | Resolved: 2024-10-30

## 2024-10-30 PROBLEM — O09.529 ANTEPARTUM MULTIGRAVIDA OF ADVANCED MATERNAL AGE: Status: RESOLVED | Noted: 2024-04-01 | Resolved: 2024-10-30

## 2024-10-30 PROBLEM — Z98.891 S/P CESAREAN SECTION: Status: RESOLVED | Noted: 2024-10-15 | Resolved: 2024-10-30

## 2024-10-30 PROBLEM — O09.93 SUPERVISION OF HIGH RISK PREGNANCY IN THIRD TRIMESTER: Status: RESOLVED | Noted: 2024-05-17 | Resolved: 2024-10-30

## 2024-10-30 PROBLEM — O34.211 MATERNAL CARE DUE TO LOW TRANSVERSE UTERINE SCAR FROM PREVIOUS CESAREAN DELIVERY: Status: RESOLVED | Noted: 2024-04-01 | Resolved: 2024-10-30

## 2024-10-30 PROBLEM — O43.193: Status: RESOLVED | Noted: 2024-09-25 | Resolved: 2024-10-30

## 2024-10-30 PROBLEM — O24.414 INSULIN CONTROLLED GESTATIONAL DIABETES MELLITUS (GDM) IN THIRD TRIMESTER: Status: RESOLVED | Noted: 2024-05-15 | Resolved: 2024-10-30

## 2024-10-30 PROBLEM — O09.813 PREGNANCY RESULTING FROM IN VITRO FERTILIZATION IN THIRD TRIMESTER: Status: RESOLVED | Noted: 2024-04-01 | Resolved: 2024-10-30

## 2024-10-30 PROBLEM — O40.3XX0 POLYHYDRAMNIOS IN THIRD TRIMESTER: Status: RESOLVED | Noted: 2024-09-25 | Resolved: 2024-10-30

## 2024-10-30 PROCEDURE — 99024 POSTOP FOLLOW-UP VISIT: CPT | Performed by: OBSTETRICS & GYNECOLOGY

## 2024-10-30 NOTE — ASSESSMENT & PLAN NOTE
"2 wks PostPartum.  Normal postpartum exam.   The patient may advance activity as tolerated and may resume sexual activity at 6 wks PP.  Contraception discussed. Patient plans: condoms   EDPS score 0. Signs and symptoms of postpartum depression reviewed  Per prior documentation she had a normal pap in 2021. Will repeat at next visit  Pt reports general \"aches\" in her body and back. No focal sx. Exam is benign and she is well appearing. Labs ordered to ensure no significant leukocytosis, anemia  Will return in 4 weeks for postpartum exam  Precautions reviewed    "

## 2024-10-30 NOTE — PROGRESS NOTES
"   Patient ID: Darlin Mckinney is a 39 y.o. female.    Chief Complaint   Patient presents with    Postpartum Care     Delivered 10/15. Taking ibuprofen every 6 hours because she is experiencing aches and pain in her abdomen area. No fever. Thinks she has an infection. No discharge from scar. Internally states she feels some \"pulling\" sensation.     PPD- 0   Substance - none         She presents for routine postpartum visit.   She is now a  who is 2wks s/p repeat C/S on 10/15/24 - [place], 37 weeks .      Discharged on procardia XL 60 mg daily, lasix 20 mg x 5 days, labetalol 200 mg q12  She received extended SSI due to a wound infection after her first C/S   Generalized body aches in back, legs  No fevers/chills/N/V   Incisional pain well controlled  VB is light overall - no foul discharge  Breastfeeding/pumping - denies clogged ducts or signs of mastitis  Denies dysuria or other UTI sx   BP's at home WNL, denies elevations, occasional low readings     Per intake pap in  [facility] although not on record       She denies abn bleeding, pelvic pain, breast complaints, bowel/bladder dysfunction, depression/anx.   Baby is thriving and is Breastfeeding    [place]  Depression Scale Total: 0  Plans for contraception: condoms          The following portions of the patient's history were reviewed and updated as appropriate: allergies, current medications, past family history, past medical history, past social history, past surgical history, and problem list.    Review of Systems   Constitutional:  Negative for appetite change, chills and fever.   Eyes:  Negative for visual disturbance.   Respiratory:  Negative for cough, chest tightness and shortness of breath.    Cardiovascular:  Negative for chest pain.   Gastrointestinal:  Negative for abdominal distention, abdominal pain, constipation, diarrhea, nausea and vomiting.   Endocrine: Negative for cold intolerance and heat intolerance.   Genitourinary:  " "Negative for difficulty urinating, dyspareunia, dysuria, frequency, genital sores, pelvic pain, urgency, vaginal bleeding, vaginal discharge and vaginal pain.   Musculoskeletal:  Negative for arthralgias.   Neurological:  Negative for light-headedness and headaches.   Hematological:  Does not bruise/bleed easily.   Psychiatric/Behavioral:  Negative for behavioral problems.    All other systems reviewed and are negative.               Objective:  /72 (BP Location: Right arm, Patient Position: Sitting, Cuff Size: Standard)   Pulse 88   Ht 5' 4\" (1.626 m)   Wt 91.6 kg (202 lb)   LMP 2023 (Exact Date)   SpO2 99%   Breastfeeding Yes   BMI 34.67 kg/m²     Physical Exam  Constitutional:       General: She is not in acute distress.     Appearance: Normal appearance.   HENT:      Head: Normocephalic and atraumatic.   Cardiovascular:      Rate and Rhythm: Normal rate.   Pulmonary:      Effort: Pulmonary effort is normal. No respiratory distress.   Abdominal:      General: A surgical scar is present. There is no distension.      Palpations: Abdomen is soft.      Tenderness: There is no abdominal tenderness. There is no guarding or rebound.      Comments: Incision C/D/I    Neurological:      General: No focal deficit present.      Mental Status: She is alert.   Psychiatric:         Mood and Affect: Mood normal.         Behavior: Behavior normal.   Vitals and nursing note reviewed.                   Postpartum Depression: Low Risk  (10/30/2024)    [place]  Depression Scale     Last EPDS Total Score: 0     Last EPDS Self Harm Result: Never         Assessment/Plan:    Problem List Items Addressed This Visit       Encounter for routine postpartum follow-up - Primary     2 wks PostPartum.  Normal postpartum exam.   The patient may advance activity as tolerated and may resume sexual activity at 6 wks PP.  Contraception discussed. Patient plans: condoms   EDPS score 0. Signs and symptoms of postpartum " "depression reviewed  Per prior documentation she had a normal pap in 2021. Will repeat at next visit  Pt reports general \"aches\" in her body and back. No focal sx. Exam is benign and she is well appearing. Labs ordered to ensure no significant leukocytosis, anemia  Will return in 4 weeks for postpartum exam  Precautions reviewed           Relevant Orders    CBC and differential    Comprehensive metabolic panel                         "

## 2024-11-01 ENCOUNTER — APPOINTMENT (OUTPATIENT)
Age: 39
End: 2024-11-01
Payer: COMMERCIAL

## 2024-11-01 LAB
ALBUMIN SERPL BCG-MCNC: 3.9 G/DL (ref 3.5–5)
ALP SERPL-CCNC: 92 U/L (ref 34–104)
ALT SERPL W P-5'-P-CCNC: 25 U/L (ref 7–52)
ANION GAP SERPL CALCULATED.3IONS-SCNC: 6 MMOL/L (ref 4–13)
AST SERPL W P-5'-P-CCNC: 19 U/L (ref 13–39)
BASOPHILS # BLD AUTO: 0.06 THOUSANDS/ΜL (ref 0–0.1)
BASOPHILS NFR BLD AUTO: 1 % (ref 0–1)
BILIRUB SERPL-MCNC: 0.8 MG/DL (ref 0.2–1)
BUN SERPL-MCNC: 19 MG/DL (ref 5–25)
CALCIUM SERPL-MCNC: 8.9 MG/DL (ref 8.4–10.2)
CHLORIDE SERPL-SCNC: 105 MMOL/L (ref 96–108)
CO2 SERPL-SCNC: 26 MMOL/L (ref 21–32)
CREAT SERPL-MCNC: 0.75 MG/DL (ref 0.6–1.3)
EOSINOPHIL # BLD AUTO: 0.18 THOUSAND/ΜL (ref 0–0.61)
EOSINOPHIL NFR BLD AUTO: 3 % (ref 0–6)
ERYTHROCYTE [DISTWIDTH] IN BLOOD BY AUTOMATED COUNT: 13.3 % (ref 11.6–15.1)
GFR SERPL CREATININE-BSD FRML MDRD: 101 ML/MIN/1.73SQ M
GLUCOSE P FAST SERPL-MCNC: 95 MG/DL (ref 65–99)
HCT VFR BLD AUTO: 35.5 % (ref 34.8–46.1)
HGB BLD-MCNC: 11.4 G/DL (ref 11.5–15.4)
IMM GRANULOCYTES # BLD AUTO: 0.01 THOUSAND/UL (ref 0–0.2)
IMM GRANULOCYTES NFR BLD AUTO: 0 % (ref 0–2)
LYMPHOCYTES # BLD AUTO: 2.45 THOUSANDS/ΜL (ref 0.6–4.47)
LYMPHOCYTES NFR BLD AUTO: 40 % (ref 14–44)
MCH RBC QN AUTO: 30.2 PG (ref 26.8–34.3)
MCHC RBC AUTO-ENTMCNC: 32.1 G/DL (ref 31.4–37.4)
MCV RBC AUTO: 94 FL (ref 82–98)
MONOCYTES # BLD AUTO: 0.35 THOUSAND/ΜL (ref 0.17–1.22)
MONOCYTES NFR BLD AUTO: 6 % (ref 4–12)
NEUTROPHILS # BLD AUTO: 3.06 THOUSANDS/ΜL (ref 1.85–7.62)
NEUTS SEG NFR BLD AUTO: 50 % (ref 43–75)
NRBC BLD AUTO-RTO: 0 /100 WBCS
PLATELET # BLD AUTO: 368 THOUSANDS/UL (ref 149–390)
PMV BLD AUTO: 10 FL (ref 8.9–12.7)
POTASSIUM SERPL-SCNC: 4 MMOL/L (ref 3.5–5.3)
PROT SERPL-MCNC: 6.3 G/DL (ref 6.4–8.4)
RBC # BLD AUTO: 3.77 MILLION/UL (ref 3.81–5.12)
SODIUM SERPL-SCNC: 137 MMOL/L (ref 135–147)
WBC # BLD AUTO: 6.11 THOUSAND/UL (ref 4.31–10.16)

## 2024-11-01 PROCEDURE — 85025 COMPLETE CBC W/AUTO DIFF WBC: CPT

## 2024-11-01 PROCEDURE — 80053 COMPREHEN METABOLIC PANEL: CPT

## 2024-11-01 PROCEDURE — 36415 COLL VENOUS BLD VENIPUNCTURE: CPT

## 2024-11-04 NOTE — TELEPHONE ENCOUNTER
Patient has been noncompliant in BP monitoring program.  Has not submitted blood pressure readings since 10/31.  Called to remind patient to submit readings.  Spoke to patient.  Reviewed instructions for submitting readings. Verbalized understanding and will submit BP readings.

## 2024-11-25 ENCOUNTER — POSTPARTUM VISIT (OUTPATIENT)
Dept: OBGYN CLINIC | Facility: CLINIC | Age: 39
End: 2024-11-25

## 2024-11-25 VITALS
SYSTOLIC BLOOD PRESSURE: 120 MMHG | WEIGHT: 199.9 LBS | HEIGHT: 64 IN | BODY MASS INDEX: 34.13 KG/M2 | HEART RATE: 80 BPM | DIASTOLIC BLOOD PRESSURE: 90 MMHG

## 2024-11-25 DIAGNOSIS — O16.9 HYPERTENSION IN DELIVERED PREGNANCY: ICD-10-CM

## 2024-11-25 DIAGNOSIS — Z98.891 STATUS POST REPEAT LOW TRANSVERSE CESAREAN SECTION: ICD-10-CM

## 2024-11-25 DIAGNOSIS — Z30.011 ENCOUNTER FOR BCP (BIRTH CONTROL PILLS) INITIAL PRESCRIPTION: ICD-10-CM

## 2024-11-25 PROCEDURE — 99024 POSTOP FOLLOW-UP VISIT: CPT | Performed by: OBSTETRICS & GYNECOLOGY

## 2024-11-25 RX ORDER — ACETAMINOPHEN AND CODEINE PHOSPHATE 120; 12 MG/5ML; MG/5ML
1 SOLUTION ORAL DAILY
Qty: 84 TABLET | Refills: 4 | Status: SHIPPED | OUTPATIENT
Start: 2024-11-25

## 2024-11-25 NOTE — PROGRESS NOTES
"Subjective       Chief Complaint   Patient presents with    Postpartum Follow-up       Darlin Mckinney is a 39 y.o. female who presents for a postpartum visit. She is 6 weeks postpartum following a RLTCS. I have fully reviewed the prenatal and intrapartum course. The delivery was at 37.2 gestational weeks. Outcome: repeat  section, low transverse incision. Anesthesia: spinal. Postpartum course has been normal. Baby's course has been normal. Baby is feeding by breast. Bleeding staining only. Bowel function is normal. Bladder function is normal. Patient is not sexually active. Contraception method is  none . Postpartum depression screening: negative.    The following portions of the patient's history were reviewed and updated as appropriate: allergies, current medications, past family history, past medical history, past social history, past surgical history, and problem list.    Postpartum Depression: Low Risk  (2024)    [place]  Depression Scale     Last EPDS Total Score: 4     Last EPDS Self Harm Result: Never       Last PAP: 3/31/19   GDM:  yes      Review of Systems   Constitutional: Negative.    HENT: Negative.     Eyes: Negative.    Respiratory: Negative.     Cardiovascular: Negative.    Gastrointestinal: Negative.    Endocrine: Negative.    Genitourinary:         As noted in HPI   Musculoskeletal: Negative.    Skin: Negative.    Allergic/Immunologic: Negative.    Neurological: Negative.    Hematological: Negative.    Psychiatric/Behavioral: Negative.           Objective     /90   Pulse 80   Ht 5' 4\" (1.626 m)   Wt 90.7 kg (199 lb 14.4 oz)   LMP 2023 (Exact Date)   Breastfeeding Yes   BMI 34.31 kg/m²    General:  alert and oriented, in no acute distress   Abdomen: soft, non-tender; bowel sounds normal; no masses,  no organomegaly  Incision is clean dry and intact       Assessment & Plan     6 weeks postpartum exam      1. Contraception: oral progesterone-only " contraceptive  2. GHTN - no meds, may disenroll from BP monitoring  3. Follow up in: 3 months or as needed.  4. 2 hour GTT    Problem List Items Addressed This Visit          Cardiovascular and Mediastinum    Hypertension in delivered pregnancy       Endocrine    Gestational diabetes mellitus (GDM), delivered       Surgery/Wound/Pain    Status post repeat low transverse  section     Other Visit Diagnoses         Postpartum care and examination    -  Primary      Encounter for BCP (birth control pills) initial prescription        Relevant Medications    norethindrone (Ortho Micronor) 0.35 MG tablet                 Future Appointments   Date Time Provider Department [place]   2024  4:00 PM Rebekah RASMUSSEN  PCP [place]   2025 11:45 AM Nancy Bai MD [facility]

## 2024-11-29 ENCOUNTER — TELEPHONE (OUTPATIENT)
Dept: OTHER | Facility: HOSPITAL | Age: 39
End: 2024-11-29

## 2024-11-29 NOTE — TELEPHONE ENCOUNTER
Remote BP Monitoring. Received a call from the Pt.  Stated she had received a call from a nurse navigator this morning r/t noncompliance with the BP monitoring program.  Per the Pt, she had a visit from her provider, Dr. Bai, this week, and was told she could be discharged from the BP monitoring program.  Shared with the Pt that there was no need to return the BP cuff and removed from the BP monitoring program at this time.

## 2024-12-17 ENCOUNTER — OFFICE VISIT (OUTPATIENT)
Age: 39
End: 2024-12-17
Payer: COMMERCIAL

## 2024-12-17 VITALS
WEIGHT: 198.85 LBS | DIASTOLIC BLOOD PRESSURE: 88 MMHG | OXYGEN SATURATION: 95 % | BODY MASS INDEX: 34.13 KG/M2 | SYSTOLIC BLOOD PRESSURE: 120 MMHG | HEART RATE: 84 BPM

## 2024-12-17 DIAGNOSIS — K21.9 GASTROESOPHAGEAL REFLUX DISEASE WITHOUT ESOPHAGITIS: ICD-10-CM

## 2024-12-17 DIAGNOSIS — O16.9 HYPERTENSION IN DELIVERED PREGNANCY: Primary | ICD-10-CM

## 2024-12-17 DIAGNOSIS — J45.909 ASTHMA, UNSPECIFIED ASTHMA SEVERITY, UNSPECIFIED WHETHER COMPLICATED, UNSPECIFIED WHETHER PERSISTENT: ICD-10-CM

## 2024-12-17 PROCEDURE — 99214 OFFICE O/P EST MOD 30 MIN: CPT

## 2024-12-17 RX ORDER — ALBUTEROL SULFATE 90 UG/1
2 INHALANT RESPIRATORY (INHALATION) EVERY 6 HOURS PRN
Qty: 6.7 G | Refills: 5 | Status: SHIPPED | OUTPATIENT
Start: 2024-12-17

## 2024-12-17 NOTE — ASSESSMENT & PLAN NOTE
Patient reports that GYN wants patient to complete 2 hours glucose testing since delivering; patient verbalized understanding on importance to testing   Lab Results   Component Value Date    HGBA1C 5.6 10/03/2024

## 2024-12-17 NOTE — PROGRESS NOTES
Name: Darlin Mckinney      : 1985      MRN: [id number]  Encounter Provider: Rebekah Cervantes  Encounter Date: 2024   Encounter department: [facility]  :  Assessment & Plan  Asthma, unspecified asthma severity, unspecified whether complicated, unspecified whether persistent  Pt reports that symptoms are controlled; needs a refill on medication; denies chest pain or SOB  Orders:    albuterol (Proventil HFA) 90 mcg/act inhaler; Inhale 2 puffs every 6 (six) hours as needed for wheezing    Hypertension in delivered pregnancy  Pt reports HTN in pregnancy and post-partum; reports that GYN took patient off all HTN medications; patient reports at home BP have been great; BP in office is 120/88; denies headaches, blurred vision or dizziness;  Patient will continue to take BP at home periodically;     Gastroesophageal reflux disease without esophagitis  Pt reports that symptoms are controlled with Prilosec;     Gestational diabetes mellitus (GDM), delivered  Patient reports that GYN wants patient to complete 2 hours glucose testing since delivering; patient verbalized understanding on importance to testing   Lab Results   Component Value Date    HGBA1C 5.6 10/03/2024                   History of Present Illness     HPI  Darlin Mckinney is a 39 year old female seen in the office today HTN follow up, asthma and GERD; all conditions are controlled;       Review of Systems   Constitutional:  Negative for chills and fever.   HENT:  Negative for ear pain and sore throat.    Eyes:  Negative for pain and visual disturbance.   Respiratory:  Negative for cough and shortness of breath.    Cardiovascular:  Negative for chest pain and palpitations.   Gastrointestinal:  Negative for abdominal pain and vomiting.   Genitourinary:  Negative for dysuria and hematuria.   Musculoskeletal:  Negative for arthralgias and back pain.   Skin:  Negative for color change and rash.   Neurological:   Negative for dizziness, seizures, syncope and headaches.   Psychiatric/Behavioral:  Negative for confusion.    All other systems reviewed and are negative.      Objective   /88 (BP Location: Right arm, Patient Position: Sitting, Cuff Size: Large)   Pulse 84   Wt 90.2 kg (198 lb 13.7 oz)   SpO2 95%   Breastfeeding Yes   BMI 34.13 kg/m²      Physical Exam  Vitals and nursing note reviewed.   Constitutional:       General: She is not in acute distress.     Appearance: Normal appearance. She is well-developed.   HENT:      Head: Normocephalic and atraumatic.      Nose: Nose normal.   Eyes:      Conjunctiva/sclera: Conjunctivae normal.   Cardiovascular:      Rate and Rhythm: Normal rate and regular rhythm.      Heart sounds: No murmur heard.  Pulmonary:      Effort: Pulmonary effort is normal. No respiratory distress.      Breath sounds: Normal breath sounds.   Musculoskeletal:      Cervical back: Neck supple.   Skin:     General: Skin is warm and dry.      Capillary Refill: Capillary refill takes less than 2 seconds.   Neurological:      Mental Status: She is alert.   Psychiatric:         Mood and Affect: Mood normal.

## 2024-12-17 NOTE — ASSESSMENT & PLAN NOTE
Pt reports HTN in pregnancy and post-partum; reports that GYN took patient off all HTN medications; patient reports at home BP have been great; BP in office is 120/88; denies headaches, blurred vision or dizziness;  Patient will continue to take BP at home periodically;

## 2024-12-17 NOTE — ASSESSMENT & PLAN NOTE
Pt reports that symptoms are controlled; needs a refill on medication; denies chest pain or SOB  Orders:    albuterol (Proventil HFA) 90 mcg/act inhaler; Inhale 2 puffs every 6 (six) hours as needed for wheezing

## 2025-03-17 ENCOUNTER — TELEPHONE (OUTPATIENT)
Age: 40
End: 2025-03-17

## 2025-03-18 ENCOUNTER — TELEPHONE (OUTPATIENT)
Age: 40
End: 2025-03-18

## 2025-05-22 ENCOUNTER — OFFICE VISIT (OUTPATIENT)
Age: 40
End: 2025-05-22
Payer: COMMERCIAL

## 2025-05-22 VITALS
HEIGHT: 64 IN | BODY MASS INDEX: 36.19 KG/M2 | TEMPERATURE: 97.3 F | HEART RATE: 103 BPM | OXYGEN SATURATION: 98 % | SYSTOLIC BLOOD PRESSURE: 128 MMHG | WEIGHT: 212 LBS | RESPIRATION RATE: 18 BRPM | DIASTOLIC BLOOD PRESSURE: 84 MMHG

## 2025-05-22 DIAGNOSIS — J02.9 ACUTE PHARYNGITIS, UNSPECIFIED ETIOLOGY: Primary | ICD-10-CM

## 2025-05-22 DIAGNOSIS — J02.9 SORE THROAT: ICD-10-CM

## 2025-05-22 LAB — S PYO AG THROAT QL: NEGATIVE

## 2025-05-22 PROCEDURE — 87147 CULTURE TYPE IMMUNOLOGIC: CPT

## 2025-05-22 PROCEDURE — G0382 LEV 3 HOSP TYPE B ED VISIT: HCPCS

## 2025-05-22 PROCEDURE — 87070 CULTURE OTHR SPECIMN AEROBIC: CPT

## 2025-05-22 RX ORDER — AMOXICILLIN 500 MG/1
500 CAPSULE ORAL EVERY 12 HOURS SCHEDULED
Qty: 20 CAPSULE | Refills: 0 | Status: SHIPPED | OUTPATIENT
Start: 2025-05-22 | End: 2025-06-01

## 2025-05-22 RX ORDER — METHYLPREDNISOLONE 4 MG/1
TABLET ORAL
Qty: 21 TABLET | Refills: 0 | Status: SHIPPED | OUTPATIENT
Start: 2025-05-22

## 2025-05-22 NOTE — PROGRESS NOTES
[facility]        NAME: Darlin Mckinney is a 39 y.o. female  : 1985    MRN: [id number]  DATE: May 22, 2025  TIME: 4:49 PM    Assessment and Plan   Acute pharyngitis, unspecified etiology [J02.9]  1. Acute pharyngitis, unspecified etiology  methylPREDNISolone 4 MG tablet therapy pack    amoxicillin (AMOXIL) 500 mg capsule      2. Sore throat  POCT rapid ANTIGEN strepA    Throat culture    Throat culture        Decongestants recommended for congestion. Rapid strep negative, will send for culture. Antibiotic started based on centor criteria. Mucinex and flonase for congestion.Follow up with PCP in 3-5 days if no improvement. Proceed to ER if symptoms worsen.    Medical Decision Making     PROBLEM: 1 acute, uncomplicated illness or injury    DATA: Note(s) Reviewed: yes, chart review    RISK: Prescription drug management    TIME: 20 min     Chief Complaint     Chief Complaint   Patient presents with    Cold Like Symptoms     Started with scratchy throat on , has not improved,  feels like her glands are swollen and hurts to swallow. No fevers reported. OTC tylenol and lozenges slightly helped.         History of Present Illness     Darlin Mckinney is a 39 y.o. female presenting to the office today for upper respiratory complaints.   Symptoms have been present for 5 days, and include sore throat, swollen glands, pain with swallowing, body aches.   She has tried tylenol for her symptoms, some relief.  Sick contacts include: none    Review of Systems     Review of Systems   Constitutional:  Negative for chills and fever.   HENT:  Positive for congestion, postnasal drip, sore throat and trouble swallowing.    Respiratory:  Positive for cough (minimal). Negative for shortness of breath and wheezing.    Gastrointestinal:  Negative for nausea and vomiting.   Genitourinary: Negative.    Musculoskeletal: Negative.    Skin: Negative.    Neurological: Negative.      Current Medications     Current  "Medications[1]    Current Allergies     Allergies as of 05/22/2025    (No Known Allergies)            The following portions of the patient's history were reviewed and updated as appropriate: allergies, current medications, past family history, past medical history, past social history, past surgical history and problem list.     Past Medical History[2]    Past Surgical History[3]    Family History[4]    Medications have been verified.    Objective     /84   Pulse 103   Temp (!) 97.3 °F (36.3 °C) (Tympanic)   Resp 18   Ht 5' 4\" (1.626 m)   Wt 96.2 kg (212 lb)   LMP 05/11/2025 (Exact Date)   SpO2 98%   Breastfeeding Unknown   BMI 36.39 kg/m²   Patient's last menstrual period was 05/11/2025 (exact date).     Physical Exam     Physical Exam  Vitals and nursing note reviewed.   Constitutional:       General: She is not in acute distress.     Appearance: Normal appearance. She is normal weight. She is not ill-appearing, toxic-appearing or diaphoretic.   HENT:      Head: Normocephalic and atraumatic.      Right Ear: Tympanic membrane, ear canal and external ear normal. There is no impacted cerumen.      Left Ear: Tympanic membrane, ear canal and external ear normal. There is no impacted cerumen.      Nose: Congestion and rhinorrhea present.      Mouth/Throat:      Mouth: Mucous membranes are moist.      Pharynx: Posterior oropharyngeal erythema present. No oropharyngeal exudate.     Eyes:      General: No scleral icterus.        Right eye: No discharge.         Left eye: No discharge.      Conjunctiva/sclera: Conjunctivae normal.       Cardiovascular:      Rate and Rhythm: Normal rate and regular rhythm.      Pulses: Normal pulses.      Heart sounds: Normal heart sounds. No murmur heard.     No friction rub. No gallop.   Pulmonary:      Effort: Pulmonary effort is normal. No respiratory distress.      Breath sounds: Normal breath sounds. No stridor. No wheezing, rhonchi or rales.   Chest:      Chest wall: " No tenderness.     Musculoskeletal:         General: Normal range of motion.      Cervical back: Normal range of motion and neck supple. No rigidity or tenderness.   Lymphadenopathy:      Cervical: Cervical adenopathy (b/l) present.     Skin:     General: Skin is warm and dry.      Capillary Refill: Capillary refill takes less than 2 seconds.      Coloration: Skin is not jaundiced.      Findings: No bruising or rash.     Neurological:      General: No focal deficit present.      Mental Status: She is alert. Mental status is at baseline.     Psychiatric:         Mood and Affect: Mood normal.         Behavior: Behavior normal.                        [1]   Current Outpatient Medications:     albuterol (Proventil HFA) 90 mcg/act inhaler, Inhale 2 puffs every 6 (six) hours as needed for wheezing, Disp: 6.7 g, Rfl: 5    amoxicillin (AMOXIL) 500 mg capsule, Take 1 capsule (500 mg total) by mouth every 12 (twelve) hours for 10 days, Disp: 20 capsule, Rfl: 0    fluticasone (FLOVENT HFA) 110 MCG/ACT inhaler, Inhale 2 puffs in the morning and 2 puffs in the evening. Rinse mouth after use., Disp: , Rfl:     methylPREDNISolone 4 MG tablet therapy pack, Use as directed on package, Disp: 21 tablet, Rfl: 0    omeprazole (PriLOSEC) 20 mg delayed release capsule, Take 1 capsule (20 mg total) by mouth daily (Patient not taking: Reported on 5/22/2025), Disp: 90 capsule, Rfl: 3    Prenatal MV-Min-Fe Fum-FA-DHA (PRENATAL 1 PO), Take by mouth (Patient not taking: Reported on 5/22/2025), Disp: , Rfl:   [2]   Past Medical History:  Diagnosis Date    Allergic     Anxiety 2012    Diagnosis from [place] dr office    Asthma 1997    GERD (gastroesophageal reflux disease) 2009    Headache(784.0)     Random    Insulin controlled gestational diabetes mellitus (GDM) in third trimester 05/15/2024    Elevated early glucola,   3 hour glucola elevated at 19 weeks      [3]   Past Surgical History:  Procedure Laterality Date    APPENDECTOMY        SECTION      [place]  DELIVERY ONLY N/A 10/15/2024    Procedure:  SECTION () REPEAT;  Surgeon: Paula Ballesteros MD;  Location: [facility] ;  Service: Obstetrics   [4]   Family History  Problem Relation Name Age of Onset    Diabetes Mother Amelia Queen     Asthma Mother Amelia Queen     Fibromyalgia Mother Amelia Queen     Arthritis Mother Amelia Queen     Arthritis Father Mihai Queen     Heart disease Maternal Grandfather Gilles Teixeiraeve     Thyroid disease Paternal Grandmother      Skin cancer Paternal Grandmother      Colon cancer Paternal Grandfather Gilles Teixeiraeve         >50    Pulmonary embolism Neg Hx      Deep vein thrombosis Neg Hx      Stroke Neg Hx

## 2025-05-22 NOTE — PATIENT INSTRUCTIONS
Patient Education     Sore throat in adults   The Basics   Written by the doctors and editors at [facility]   What causes sore throat? -- Sore throat is usually caused by an infection. Two types of germs can cause it: viruses and bacteria.  People who have a sore throat caused by a virus do not usually need to see a doctor or nurse. But if you think that you might have been exposed to COVID-19, or if COVID-19 is common where you live, ask your doctor or nurse if you should be tested.  People who have a sore throat caused by bacteria might need to see a doctor or nurse. They might have a type of infection called strep throat. Only about 1 in 10 adults who seek medical care for sore throat have strep throat.  How can I tell if my sore throat is caused by a virus or strep throat? -- It is hard to tell the difference. But there are some clues to look for.  People who have a sore throat caused by a virus usually have other symptoms, such as:   Stuffy or runny nose   Itchy or red eyes   Cough  People who have COVID-19 can have a sore throat as their only symptom. Or they can have other symptoms such as fever, cough, trouble breathing, and problems with their sense of smell or taste. In most cases, the only way to know for sure if you have COVID-19 is to get tested.  People who have strep throat do not usually have a cough, runny nose, or itchy or red eyes. They might have:   Severe throat pain   Fever (temperature higher than 100.4°F or 38°C)   Swollen glands in the neck  If you think that you have strep throat, the doctor or nurse can easily check. They can take a sample from the back of your throat and test it for the bacteria that cause strep throat.  Do I need antibiotics? -- If you have an infection caused by a virus, you do not need antibiotics. But if you have strep throat, you should get antibiotics. Most people with strep throat get better without antibiotics, but doctors and nurses often prescribe them. This is  "because antibiotics often can prevent other problems that might be caused by strep throat. Plus, antibiotics can reduce the symptoms of strep throat and prevent you from spreading it to other people.  What can I do to feel better? -- To relieve the pain of sore throat, you can:   Take over-the-counter pain medicine - Acetaminophen (sample brand name: Tylenol) or ibuprofen (sample brand names: Advil, Motrin) can help with throat pain.   Use sore throat lozenges or sprays - Using medicated sore throat lozenges or throat sprays can temporarily reduce throat pain.   Suck on hard candies, ice chips, or ice pops.   Gargle with salt water - Some people find that this helps with throat pain.   Use a cool mist humidifier - This adds moisture to the air to keep the throat from getting too dry and might help with pain.   Avoid smoking or being around people who are smoking - Smoke can make throat pain worse.  When can I go back to work or school? -- If you have strep throat, wait 1 day after starting antibiotics. By then, you will be a lot less likely to spread the infection to others.  If you have COVID-19, stay home from work or school and \"self-isolate\" until your doctor or nurse tells you it's safe to stop. Self-isolation means staying apart from other people, even the people you live with. When you can stop self-isolation depends on how long it has been since you had symptoms, and in some cases, whether you have had a negative test (showing that the virus is no longer in your body).  If you have a sore throat that is not due to strep throat or COVID-19, you can go back to your usual activities as soon as you feel well. But it's still important to wash your hands often and cover your mouth if you cough.  When should I call the doctor? -- Call your doctor or nurse if:   You have a fever of at least 101°F (38.4°C).   Your throat pain is severe within the first 2 days, or does not start to improve within 5 to 7 days.   You " are having trouble getting enough to eat or drink.   You got antibiotics but still have symptoms after finishing them.  Call for an ambulance (in the [place] and [place], call 9-1-1) or go to the emergency department if you:   Have trouble breathing   Are drooling because you cannot swallow your saliva   Have swelling of the neck or tongue   Cannot move your neck, or have trouble opening your mouth  What can I do to prevent getting a sore throat again? -- Wash your hands often with soap and water (figure 1). It is one of the best ways to prevent the spread of infection.  All topics are updated as new evidence becomes available and our peer review process is complete.  This topic retrieved from [employer] on: Mar 13, 2024.  Topic 11347 Version 23.0  Release: 32.2.4 - C32.71  © 2024 UpToDate, Inc. and/or its affiliates. All rights reserved.  figure 1: How to wash your hands     Wet your hands with clean water, and apply a small amount of soap. Lather and rub hands together for at least 20 seconds. Clean your wrists, palms, backs of your hands, between your fingers, tips of your fingers, thumbs, and under and around your nails. Rinse well, and dry your hands using a clean towel.  Graphic 850721 Version 7.0  Consumer Information Use and Disclaimer   Disclaimer: This generalized information is a limited summary of diagnosis, treatment, and/or medication information. It is not meant to be comprehensive and should be used as a tool to help the user understand and/or assess potential diagnostic and treatment options. It does NOT include all information about conditions, treatments, medications, side effects, or risks that may apply to a specific patient. It is not intended to be medical advice or a substitute for the medical advice, diagnosis, or treatment of a health care provider based on the health care provider's examination and assessment of a patient's specific and unique circumstances. Patients must speak with a health care  provider for complete information about their health, medical questions, and treatment options, including any risks or benefits regarding use of medications. This information does not endorse any treatments or medications as safe, effective, or approved for treating a specific patient. UpToDate, Inc. and its affiliates disclaim any warranty or liability relating to this information or the use thereof.The use of this information is governed by the Terms of Use, available at https://www.[employer]ionuwer.com/en/know/clinical-effectiveness-terms. 2024© UpToDate, Inc. and its affiliates and/or licensors. All rights reserved.  Copyright   © 2024 UpToDate, Inc. and/or its affiliates. All rights reserved.

## 2025-05-24 LAB — BACTERIA THROAT CULT: ABNORMAL

## 2025-05-27 ENCOUNTER — RESULTS FOLLOW-UP (OUTPATIENT)
Age: 40
End: 2025-05-27

## 2025-06-14 ENCOUNTER — APPOINTMENT (EMERGENCY)
Dept: CT IMAGING | Facility: HOSPITAL | Age: 40
End: 2025-06-14
Payer: COMMERCIAL

## 2025-06-14 ENCOUNTER — HOSPITAL ENCOUNTER (EMERGENCY)
Facility: HOSPITAL | Age: 40
Discharge: HOME | End: 2025-06-15
Attending: EMERGENCY MEDICINE | Admitting: EMERGENCY MEDICINE
Payer: COMMERCIAL

## 2025-06-14 VITALS
HEART RATE: 86 BPM | RESPIRATION RATE: 16 BRPM | OXYGEN SATURATION: 100 % | WEIGHT: 220.24 LBS | BODY MASS INDEX: 37.8 KG/M2 | TEMPERATURE: 98.7 F | SYSTOLIC BLOOD PRESSURE: 133 MMHG | DIASTOLIC BLOOD PRESSURE: 72 MMHG

## 2025-06-14 DIAGNOSIS — M54.9 BACK PAIN: Primary | ICD-10-CM

## 2025-06-14 LAB
ANION GAP SERPL CALCULATED.3IONS-SCNC: 6 MMOL/L (ref 4–13)
BACTERIA UR QL AUTO: ABNORMAL /HPF
BASOPHILS # BLD AUTO: 0.03 THOUSANDS/ÂΜL (ref 0–0.1)
BASOPHILS NFR BLD AUTO: 0 % (ref 0–1)
BILIRUB UR QL STRIP: NEGATIVE
BUN SERPL-MCNC: 18 MG/DL (ref 5–25)
CALCIUM SERPL-MCNC: 9.1 MG/DL (ref 8.4–10.2)
CHLORIDE SERPL-SCNC: 105 MMOL/L (ref 96–108)
CLARITY UR: CLEAR
CO2 SERPL-SCNC: 25 MMOL/L (ref 21–32)
COLOR UR: YELLOW
CREAT SERPL-MCNC: 0.75 MG/DL (ref 0.6–1.3)
EOSINOPHIL # BLD AUTO: 0.11 THOUSAND/ÂΜL (ref 0–0.61)
EOSINOPHIL NFR BLD AUTO: 1 % (ref 0–6)
ERYTHROCYTE [DISTWIDTH] IN BLOOD BY AUTOMATED COUNT: 13 % (ref 11.6–15.1)
EXT PREGNANCY TEST URINE: NEGATIVE
EXT. CONTROL: NORMAL
GFR SERPL CREATININE-BSD FRML MDRD: 101 ML/MIN/1.73SQ M
GLUCOSE SERPL-MCNC: 104 MG/DL (ref 65–140)
GLUCOSE UR STRIP-MCNC: NEGATIVE MG/DL
HCT VFR BLD AUTO: 34.7 % (ref 34.8–46.1)
HGB BLD-MCNC: 12.2 G/DL (ref 11.5–15.4)
HGB UR QL STRIP.AUTO: ABNORMAL
HYALINE CASTS #/AREA URNS LPF: ABNORMAL /LPF
IMM GRANULOCYTES # BLD AUTO: 0.02 THOUSAND/UL (ref 0–0.2)
IMM GRANULOCYTES NFR BLD AUTO: 0 % (ref 0–2)
KETONES UR STRIP-MCNC: ABNORMAL MG/DL
LEUKOCYTE ESTERASE UR QL STRIP: NEGATIVE
LYMPHOCYTES # BLD AUTO: 2.69 THOUSANDS/ÂΜL (ref 0.6–4.47)
LYMPHOCYTES NFR BLD AUTO: 30 % (ref 14–44)
MCH RBC QN AUTO: 32 PG (ref 26.8–34.3)
MCHC RBC AUTO-ENTMCNC: 35.2 G/DL (ref 31.4–37.4)
MCV RBC AUTO: 91 FL (ref 82–98)
MONOCYTES # BLD AUTO: 0.57 THOUSAND/ÂΜL (ref 0.17–1.22)
MONOCYTES NFR BLD AUTO: 6 % (ref 4–12)
MUCOUS THREADS UR QL AUTO: ABNORMAL
NEUTROPHILS # BLD AUTO: 5.57 THOUSANDS/ÂΜL (ref 1.85–7.62)
NEUTS SEG NFR BLD AUTO: 63 % (ref 43–75)
NITRITE UR QL STRIP: NEGATIVE
NON-SQ EPI CELLS URNS QL MICRO: ABNORMAL /HPF
NRBC BLD AUTO-RTO: 0 /100 WBCS
PH UR STRIP.AUTO: 5.5 [PH] (ref 4.5–8)
PLATELET # BLD AUTO: 258 THOUSANDS/UL (ref 149–390)
PMV BLD AUTO: 9.1 FL (ref 8.9–12.7)
POTASSIUM SERPL-SCNC: 3.6 MMOL/L (ref 3.5–5.3)
PROT UR STRIP-MCNC: NEGATIVE MG/DL
RBC # BLD AUTO: 3.81 MILLION/UL (ref 3.81–5.12)
RBC #/AREA URNS AUTO: ABNORMAL /HPF
SODIUM SERPL-SCNC: 136 MMOL/L (ref 135–147)
SP GR UR STRIP.AUTO: >=1.03 (ref 1–1.03)
UROBILINOGEN UR QL STRIP.AUTO: 0.2 E.U./DL
WBC # BLD AUTO: 8.99 THOUSAND/UL (ref 4.31–10.16)
WBC #/AREA URNS AUTO: ABNORMAL /HPF

## 2025-06-14 PROCEDURE — 74176 CT ABD & PELVIS W/O CONTRAST: CPT

## 2025-06-14 PROCEDURE — 81025 URINE PREGNANCY TEST: CPT | Performed by: EMERGENCY MEDICINE

## 2025-06-14 PROCEDURE — 99284 EMERGENCY DEPT VISIT MOD MDM: CPT | Performed by: EMERGENCY MEDICINE

## 2025-06-14 PROCEDURE — 99283 EMERGENCY DEPT VISIT LOW MDM: CPT

## 2025-06-14 PROCEDURE — 81001 URINALYSIS AUTO W/SCOPE: CPT

## 2025-06-14 PROCEDURE — 96375 TX/PRO/DX INJ NEW DRUG ADDON: CPT

## 2025-06-14 PROCEDURE — 36415 COLL VENOUS BLD VENIPUNCTURE: CPT | Performed by: EMERGENCY MEDICINE

## 2025-06-14 PROCEDURE — 85025 COMPLETE CBC W/AUTO DIFF WBC: CPT | Performed by: EMERGENCY MEDICINE

## 2025-06-14 PROCEDURE — 96374 THER/PROPH/DIAG INJ IV PUSH: CPT

## 2025-06-14 PROCEDURE — 80048 BASIC METABOLIC PNL TOTAL CA: CPT | Performed by: EMERGENCY MEDICINE

## 2025-06-14 RX ORDER — NAPROXEN 500 MG/1
500 TABLET ORAL 2 TIMES DAILY WITH MEALS
Qty: 20 TABLET | Refills: 0 | Status: SHIPPED | OUTPATIENT
Start: 2025-06-14 | End: 2025-06-24

## 2025-06-14 RX ORDER — NAPROXEN 500 MG/1
500 TABLET ORAL 2 TIMES DAILY WITH MEALS
Qty: 20 TABLET | Refills: 0 | Status: SHIPPED | OUTPATIENT
Start: 2025-06-14 | End: 2025-06-14

## 2025-06-14 RX ORDER — KETOROLAC TROMETHAMINE 30 MG/ML
30 INJECTION, SOLUTION INTRAMUSCULAR; INTRAVENOUS ONCE
Status: COMPLETED | OUTPATIENT
Start: 2025-06-14 | End: 2025-06-14

## 2025-06-14 RX ORDER — METHOCARBAMOL 500 MG/1
500 TABLET, FILM COATED ORAL 2 TIMES DAILY
Qty: 20 TABLET | Refills: 0 | Status: SHIPPED | OUTPATIENT
Start: 2025-06-14

## 2025-06-14 RX ORDER — METHOCARBAMOL 500 MG/1
500 TABLET, FILM COATED ORAL 2 TIMES DAILY
Qty: 20 TABLET | Refills: 0 | Status: SHIPPED | OUTPATIENT
Start: 2025-06-14 | End: 2025-06-14

## 2025-06-14 RX ADMIN — MORPHINE SULFATE 2 MG: 2 INJECTION, SOLUTION INTRAMUSCULAR; INTRAVENOUS at 22:19

## 2025-06-14 RX ADMIN — KETOROLAC TROMETHAMINE 30 MG: 30 INJECTION, SOLUTION INTRAMUSCULAR; INTRAVENOUS at 20:44

## 2025-06-14 NOTE — Clinical Note
Darlin Mckinney was seen and treated in our emergency department on 6/14/2025.    No restrictions            Diagnosis:     Darlin  may return to work on return date.    She may return on this date: 06/16/2025         If you have any questions or concerns, please don't hesitate to call.      Nuno Burch MD    ______________________________           _______________          _______________  Hospital Representative                              Date                                Time

## 2025-06-14 NOTE — ED PROVIDER NOTES
Time reflects when diagnosis was documented in both MDM as applicable and the Disposition within this note       Time User Action Codes Description Comment    6/14/2025 11:45 PM Nuno Burch Add [M54.9] Back pain           ED Disposition       ED Disposition   Discharge    Condition   Stable    Date/Time   Sat Jun 14, 2025 11:45 PM    Comment   Darlin Mckinney discharge to home/self care.                   Assessment & Plan       Medical Decision Making  Intermittent flank pain since Thursday, assoc w/ mild nausea w/ non-focal exam.  Ddx includes/not limited to renal colic, msk, uti/pyelo, doubt colitis. Will get ua/preg, stone study, chemistries to eval renal function, electrolytes, cbc to eval for leukocytosis/bandemia.    Problems Addressed:  Back pain: undiagnosed new problem with uncertain prognosis    Amount and/or Complexity of Data Reviewed  Labs: ordered. Decision-making details documented in ED Course.  Radiology: ordered.    Risk  Prescription drug management.        ED Course as of 06/16/25 0821   Sat Jun 14, 2025 2016 Blood, UA(!): Trace   2140 Care transferred to Dr. Burch  Left flank pain w/ radiation toward front  Not reproducible  Pending CT stone study       Medications   ketorolac (TORADOL) injection 30 mg (30 mg Intravenous Given 6/14/25 2044)   morphine injection 2 mg (2 mg Intravenous Given 6/14/25 2219)       ED Risk Strat Scores                    No data recorded        SBIRT 20yo+      Flowsheet Row Most Recent Value   Initial Alcohol Screen: US AUDIT-C     1. How often do you have a drink containing alcohol? 0 Filed at: 06/14/2025 1907   2. How many drinks containing alcohol do you have on a typical day you are drinking?  0 Filed at: 06/14/2025 1907   3b. FEMALE Any Age, or MALE 65+: How often do you have 4 or more drinks on one occassion? 0 Filed at: 06/14/2025 1907   Audit-C Score 0 Filed at: 06/14/2025 1907   CHASTITY: How many times in the past year have you...    Used an illegal  drug or used a prescription medication for non-medical reasons? Never Filed at: 06/14/2025 1907                            History of Present Illness       Chief Complaint   Patient presents with    Back Pain     Pt reports L sided lower back pain that started Thursday. Denies urinary symptoms.       Past Medical History[1]   Past Surgical History[2]   Family History[3]   Social History[4]   E-Cigarette/Vaping    E-Cigarette Use Former User       E-Cigarette/Vaping Substances    Nicotine No     THC No     CBD No     Flavoring No     Other No     Unknown No       I have reviewed and agree with the history as documented.     Patient presents with:  Back Pain: Pt reports L sided lower back pain that started Thursday. Denies urinary symptoms.    39y F here for evaluation of left flank pain, intermittent since Thursday.  Dull pain, intermittently sharp, wax/wane, occas nausea, no vomiting.  Some radiation around up under the ribs.  No fever, occas chills, no sweats.  No cough/congestion, no urinary symptoms - no dysuria, gross hematuria.  Denies falls or injuries.  Denies b/b incont, no saddle/perineal anesthesia, no previous back surgeries, no hx of recurrent back pain.    No hx of kidney stones in the past, hx of previous c-sections, appendectomy      History provided by:  Patient   used: No    Back Pain      Review of Systems   Musculoskeletal:  Positive for back pain.   All other systems reviewed and are negative.          Objective       ED Triage Vitals   Temperature Pulse Blood Pressure Respirations SpO2 Patient Position - Orthostatic VS   06/14/25 1908 06/14/25 1904 06/14/25 1908 06/14/25 1904 06/14/25 1904 06/14/25 1908   98.7 °F (37.1 °C) 105 148/87 18 100 % Sitting      Temp Source Heart Rate Source BP Location FiO2 (%) Pain Score    06/14/25 1908 06/14/25 1904 06/14/25 1908 -- 06/14/25 1904    Oral Monitor Right arm  2      Vitals      Date and Time Temp Pulse SpO2 Resp BP Pain Score FACES  Pain Rating User   06/14/25 2340 -- 86 100 % 16 133/72 -- -- BRB   06/14/25 2219 -- -- -- -- -- 8 -- BRB   06/14/25 2044 -- -- -- -- -- 4 -- BRB   06/14/25 1908 98.7 °F (37.1 °C) -- -- -- 148/87 -- -- SL   06/14/25 1904 -- 105 100 % 18 -- 2 --             Physical Exam  Vitals and nursing note reviewed.   Constitutional:       Appearance: Normal appearance.     Eyes:      Conjunctiva/sclera: Conjunctivae normal.       Cardiovascular:      Rate and Rhythm: Regular rhythm. Tachycardia present.      Heart sounds: No murmur heard.  Pulmonary:      Effort: Pulmonary effort is normal.   Abdominal:      Palpations: Abdomen is soft.      Tenderness: There is no abdominal tenderness. There is no right CVA tenderness or left CVA tenderness.     Musculoskeletal:      Cervical back: Normal range of motion.     Skin:     General: Skin is warm.     Neurological:      General: No focal deficit present.      Mental Status: She is alert.         Results Reviewed       Procedure Component Value Units Date/Time    Basic metabolic panel [[id number]] Collected: 06/14/25 2043    Lab Status: Final result Specimen: Blood from Arm, Left Updated: 06/14/25 2111     Sodium 136 mmol/L      Potassium 3.6 mmol/L      Chloride 105 mmol/L      CO2 25 mmol/L      ANION GAP 6 mmol/L      BUN 18 mg/dL      Creatinine 0.75 mg/dL      Glucose 104 mg/dL      Calcium 9.1 mg/dL      eGFR 101 ml/min/1.73sq m     Narrative:      National Kidney Disease Foundation guidelines for Chronic Kidney Disease (CKD):     Stage 1 with normal or high GFR (GFR > 90 mL/min/1.73 square meters)    Stage 2 Mild CKD (GFR = 60-89 mL/min/1.73 square meters)    Stage 3A Moderate CKD (GFR = 45-59 mL/min/1.73 square meters)    Stage 3B Moderate CKD (GFR = 30-44 mL/min/1.73 square meters)    Stage 4 Severe CKD (GFR = 15-29 mL/min/1.73 square meters)    Stage 5 End Stage CKD (GFR <15 mL/min/1.73 square meters)  Note: GFR calculation is accurate only with a steady state creatinine     Urine Microscopic [[id number]]  (Abnormal) Collected: 06/14/25 1933    Lab Status: Final result Specimen: Urine, Clean Catch Updated: 06/14/25 2057     RBC, UA 1-2 /hpf      WBC, UA None Seen /hpf      Epithelial Cells Occasional /hpf      Bacteria, UA Occasional /hpf      MUCUS THREADS Occasional     Hyaline Casts, UA 0-3 /lpf     CBC and differential [[id number]]  (Abnormal) Collected: 06/14/25 2043    Lab Status: Final result Specimen: Blood from Arm, Left Updated: 06/14/25 2050     WBC 8.99 Thousand/uL      RBC 3.81 Million/uL      Hemoglobin 12.2 g/dL      Hematocrit 34.7 %      MCV 91 fL      MCH 32.0 pg      MCHC 35.2 g/dL      RDW 13.0 %      MPV 9.1 fL      Platelets 258 Thousands/uL      nRBC 0 /100 WBCs      Segmented % 63 %      Immature Grans % 0 %      Lymphocytes % 30 %      Monocytes % 6 %      Eosinophils Relative 1 %      Basophils Relative 0 %      Absolute Neutrophils 5.57 Thousands/µL      Absolute Immature Grans 0.02 Thousand/uL      Absolute Lymphocytes 2.69 Thousands/µL      Absolute Monocytes 0.57 Thousand/µL      Eosinophils Absolute 0.11 Thousand/µL      Basophils Absolute 0.03 Thousands/µL     POCT pregnancy, urine [[id number]]  (Normal) Collected: 06/14/25 2027    Lab Status: Final result Updated: 06/14/25 2031     EXT Preg Test, Ur Negative     Control Valid    Urine Macroscopic, POC [[id number]]  (Abnormal) Collected: 06/14/25 1933    Lab Status: Final result Specimen: Urine Updated: 06/14/25 1934     Color, UA Yellow     Clarity, UA Clear     pH, UA 5.5     Leukocytes, UA Negative     Nitrite, UA Negative     Protein, UA Negative mg/dl      Glucose, UA Negative mg/dl      Ketones, UA Trace mg/dl      Urobilinogen, UA 0.2 E.U./dl      Bilirubin, UA Negative     Occult Blood, UA Trace     Specific Gravity, UA >=1.030    Narrative:      CLIN[facility] RESULT            CT renal stone study abdomen pelvis without contrast   Final Interpretation by Deven Camp DO (06/14 [id number])       No nephrolithiasis or hydronephrosis is seen.      Ill-defined area of mesenteric fat stranding in the mid abdomen with associated shotty mesenteric lymph nodes, suspicious for mesenteric inflammation/panniculitis.      Fat-containing umbilical hernia, some associated stranding of the herniated fat could represent inflammation/fat necrosis, correlation with the patient's symptoms recommended. No associated bowel loops.      Other findings as above.         Workstation performed: [id number]             Procedures    ED Medication and Procedure Management   Prior to Admission Medications   Prescriptions Last Dose Informant Patient Reported? Taking?   Prenatal MV-Min-Fe Fum-FA-DHA (PRENATAL 1 PO)  Self Yes No   Sig: Take by mouth   Patient not taking: Reported on 5/22/2025   albuterol (Proventil HFA) 90 mcg/act inhaler   No Yes   Sig: Inhale 2 puffs every 6 (six) hours as needed for wheezing   fluticasone (FLOVENT HFA) 110 MCG/ACT inhaler   Yes No   Sig: Inhale 2 puffs in the morning and 2 puffs in the evening. Rinse mouth after use.   methylPREDNISolone 4 MG tablet therapy pack   No No   Sig: Use as directed on package   omeprazole (PriLOSEC) 20 mg delayed release capsule  Self No No   Sig: Take 1 capsule (20 mg total) by mouth daily   Patient not taking: Reported on 5/22/2025      Facility-Administered Medications: None     Discharge Medication List as of 6/14/2025 11:47 PM        START taking these medications    Details   methocarbamol (ROBAXIN) 500 mg tablet Take 1 tablet (500 mg total) by mouth 2 (two) times a day, Starting Sat 6/14/2025, Normal      naproxen (NAPROSYN) 500 mg tablet Take 1 tablet (500 mg total) by mouth 2 (two) times a day with meals for 10 days, Starting Sat 6/14/2025, Until Tue 6/24/2025, Normal           CONTINUE these medications which have NOT CHANGED    Details   albuterol (Proventil HFA) 90 mcg/act inhaler Inhale 2 puffs every 6 (six) hours as needed for wheezing, Starting Tue  2024, Normal      fluticasone (FLOVENT HFA) 110 MCG/ACT inhaler Inhale 2 puffs in the morning and 2 puffs in the evening. Rinse mouth after use., Historical Med      methylPREDNISolone 4 MG tablet therapy pack Use as directed on package, Normal      omeprazole (PriLOSEC) 20 mg delayed release capsule Take 1 capsule (20 mg total) by mouth daily, Starting Mon 2024, Normal      Prenatal MV-Min-Fe Fum-FA-DHA (PRENATAL 1 PO) Take by mouth, Historical Med           No discharge procedures on file.  ED SEPSIS DOCUMENTATION   Time reflects when diagnosis was documented in both MDM as applicable and the Disposition within this note       Time User Action Codes Description Comment    2025 11:45 PM Nuno Burch Add [M54.9] Back pain                    [1]   Past Medical History:  Diagnosis Date    Allergic     Anxiety     Diagnosis from [place] [facility] office    Asthma     GERD (gastroesophageal reflux disease)     Headache(784.0)     Random    Insulin controlled gestational diabetes mellitus (GDM) in third trimester 05/15/2024    Elevated early glucola,   3 hour glucola elevated at 19 weeks      [2]   Past Surgical History:  Procedure Laterality Date    APPENDECTOMY       SECTION      RI  DELIVERY ONLY N/A 10/15/2024    Procedure:  SECTION () REPEAT;  Surgeon: Paula Ballesteros MD;  [facility];  Service: Obstetrics   [3]   Family History  Problem Relation Name Age of Onset    Diabetes Mother Amelia Queen     Asthma Mother Amelia Queen     Fibromyalgia Mother Amelia Queen     Arthritis Mother Amelia Queen     Arthritis Father Mihai Queen     Heart disease Maternal Grandfather Gilles Queen     Thyroid disease Paternal Grandmother      Skin cancer Paternal Grandmother      Colon cancer Paternal Grandfather Gilles Bret         >50    Pulmonary embolism Neg Hx      Deep vein thrombosis Neg Hx      Stroke Neg Hx     [4]   Social History  Tobacco Use     Smoking status: Former     Current packs/day: 0.00     Types: Cigarettes     Start date: 2008     Quit date: 2018     Years since quittin.4    Smokeless tobacco: Never   Vaping Use    Vaping status: Former   Substance Use Topics    Alcohol use: Yes     Alcohol/week: 5.0 standard drinks of alcohol     Types: 5 Cans of beer per week     Comment: Social    Drug use: Never        Oksana Carmichael DO  25 0821

## 2025-06-15 NOTE — DISCHARGE INSTRUCTIONS
Take the naprosyn twice daily for the next 10 days.    Use the Robaxin as needed for muscle spasm.    Use an electric heating pad over your sore muscles, 4-5 times daily, 20 minutes each time.    Make sure to drink plenty of fluids.    Call your family doctor, let them know you were in the ER, you should be seen in the office for further evaluation and management if your discomfort continues.

## 2025-08-11 ENCOUNTER — ANNUAL EXAM (OUTPATIENT)
Dept: OBGYN CLINIC | Facility: CLINIC | Age: 40
End: 2025-08-11
Payer: COMMERCIAL

## 2025-08-11 PROBLEM — Z80.0 FAMILY HISTORY OF COLON CANCER: Status: ACTIVE | Noted: 2025-08-11

## (undated) DEVICE — PACK C-SECTION PBDS

## (undated) DEVICE — MEDI-VAC YANKAUER SUCTION HANDLE W/STRAIGHT TIP & CONTROL VENT: Brand: CARDINAL HEALTH

## (undated) DEVICE — DRESSING MEPILEX AG BORDER POST-OP 4 X 10 IN

## (undated) DEVICE — 3M™ STERI-STRIP™ COMPOUND BENZOIN TINCTURE 40 BAGS/CARTON 4 CARTONS/CASE C1544: Brand: 3M™ STERI-STRIP™

## (undated) DEVICE — SUT PLAIN 3-0 CT-1 27 IN 842H

## (undated) DEVICE — GLOVE PI ULTRA TOUCH SZ 6

## (undated) DEVICE — SUT MONOCRYL 4-0 PS-2 27 IN Y426H

## (undated) DEVICE — SCD SEQUENTIAL COMPRESSION COMFORT SLEEVE MEDIUM KNEE LENGTH: Brand: KENDALL SCD

## (undated) DEVICE — WOUND RETRACTOR AND PROTECTOR: Brand: ALEXIS O WOUND PROTECTOR-RETRACTOR

## (undated) DEVICE — CHLORAPREP HI-LITE 10.5ML ORANGE

## (undated) DEVICE — SUT VICRYL 3-0 CT-1 36 IN J944H

## (undated) DEVICE — GLOVE INDICATOR PI UNDERGLOVE SZ 6.5 BLUE

## (undated) DEVICE — MEPILEX

## (undated) DEVICE — SKIN MARKER DUAL TIP WITH RULER CAP, FLEXIBLE RULER AND LABELS: Brand: DEVON

## (undated) DEVICE — ABDOMINAL PAD: Brand: DERMACEA

## (undated) DEVICE — SURGICEL 2 X 14

## (undated) DEVICE — SUT VICRYL 0 CT-1 36 IN J946H

## (undated) DEVICE — DRESSING TELFA 2 X 3 IN STRL